# Patient Record
Sex: FEMALE | Race: WHITE | NOT HISPANIC OR LATINO | Employment: OTHER | URBAN - METROPOLITAN AREA
[De-identification: names, ages, dates, MRNs, and addresses within clinical notes are randomized per-mention and may not be internally consistent; named-entity substitution may affect disease eponyms.]

---

## 2017-03-27 ENCOUNTER — TELEPHONE (OUTPATIENT)
Dept: OBSTETRICS AND GYNECOLOGY | Facility: CLINIC | Age: 72
End: 2017-03-27

## 2017-03-27 NOTE — TELEPHONE ENCOUNTER
----- Message from Lizette Carrillo sent at 3/27/2017 11:24 AM CDT -----  Contact: self  Patient is scheduled from a mammogram on 5/4/17  She would like to know if this is the digital  one   Please call  to advise.    Thanks

## 2017-04-25 ENCOUNTER — PATIENT MESSAGE (OUTPATIENT)
Dept: OBSTETRICS AND GYNECOLOGY | Facility: CLINIC | Age: 72
End: 2017-04-25

## 2017-05-04 ENCOUNTER — OFFICE VISIT (OUTPATIENT)
Dept: OBSTETRICS AND GYNECOLOGY | Facility: CLINIC | Age: 72
End: 2017-05-04
Payer: MEDICARE

## 2017-05-04 VITALS
WEIGHT: 138.25 LBS | BODY MASS INDEX: 22.22 KG/M2 | SYSTOLIC BLOOD PRESSURE: 130 MMHG | DIASTOLIC BLOOD PRESSURE: 78 MMHG | HEIGHT: 66 IN

## 2017-05-04 DIAGNOSIS — M85.89 OSTEOPENIA OF MULTIPLE SITES: Primary | ICD-10-CM

## 2017-05-04 PROCEDURE — 99999 PR PBB SHADOW E&M-EST. PATIENT-LVL III: CPT | Mod: PBBFAC,,, | Performed by: OBSTETRICS & GYNECOLOGY

## 2017-05-04 PROCEDURE — G0101 CA SCREEN;PELVIC/BREAST EXAM: HCPCS | Mod: S$GLB,,, | Performed by: OBSTETRICS & GYNECOLOGY

## 2017-05-04 RX ORDER — ESTRADIOL 0.1 MG/G
2 CREAM VAGINAL
Qty: 42.5 G | Refills: 4 | Status: SHIPPED | OUTPATIENT
Start: 2017-05-04 | End: 2017-07-10

## 2017-05-04 RX ORDER — ATORVASTATIN CALCIUM 20 MG/1
TABLET, FILM COATED ORAL
COMMUNITY
Start: 2017-05-02 | End: 2017-07-10

## 2017-05-04 NOTE — MR AVS SNAPSHOT
"    [facility]  [address], [address]  [facility] [address]  Phone: [phone]  Fax: [phone]                  Farrah PUENTE Ludy   2017 10:00 AM   Office Visit    Description:  Female : 1945   Provider:  Kingsley Shah MD   Department:  [facility]           Reason for Visit     Well Woman                To Do List           Future Appointments        Provider Department Dept Phone    2017 11:00 AM [facility] MAM[facility] [place] - Mammography [phone]      Goals (5 Years of Data)     None      [facility]     [facility] On Call Nurse Care Line -  Assistance  Unless otherwise directed by your provider, please contact [facility], our nurse care line that is available for  assistance.     Registered nurses in the [facility] provide: appointment scheduling, clinical advisement, health education, and other advisory services.  Call: [phone] (toll free)               Medications           Message regarding Medications     Verify the changes and/or additions to your medication regime listed below are the same as discussed with your clinician today.  If any of these changes or additions are incorrect, please notify your healthcare provider.             Verify that the below list of medications is an accurate representation of the medications you are currently taking.  If none reported, the list may be blank. If incorrect, please contact your healthcare provider. Carry this list with you in case of emergency.           Current Medications     estradiol (ESTRACE) 0.01 % (0.1 mg/gram) vaginal cream Place 2 g vaginally twice a week.    fluticasone (FLONASE) 50 mcg/actuation nasal spray 1 spray by Each Nare route once daily.    omeprazole (PRILOSEC) 40 MG capsule     atorvastatin (LIPITOR) 20 MG tablet            Clinical Reference Information           Your Vitals Were     BP Height Weight BMI       130/78 5' 6" (1.676 m) 62.7 kg " (138 lb 3.7 oz) 22.31 kg/m2       Blood Pressure          Most Recent Value    BP  130/78      Allergies as of 5/4/2017     Morphine    Codeine      Immunizations Administered on Date of Encounter - 5/4/2017     None      Language Assistance Services     ATTENTION: Language assistance services are available, free of charge. Please call [phone].      ATENCIÓN: Si habla español, tiene a robbins disposición servicios gratuitos de asistencia lingüística. Llame al [phone].     LV Ý: N?u b?n nói Ti?ng Vi?t, có các d?ch v? h? tr? ngôn ng? mi?n phí dành cho b?n. G?i s? [phone].         [facility] complies with applicable Federal civil rights laws and does not discriminate on the basis of race, color, national origin, age, disability, or sex.

## 2017-05-04 NOTE — PROGRESS NOTES
"Chief Complaint   Patient presents with    Well Woman       History and Physical:  No LMP recorded. Patient has had a hysterectomy.       Farrah Boston is a 72 y.o.  female who presents today for her routine annual GYN exam. The patient has no Gynecology complaints today. slgiht breast soreness, negative cardiac work up, not sexually active, counseled - will decrease estrace to once a week.       Allergies:   Review of patient's allergies indicates:   Allergen Reactions    Morphine Shortness Of Breath     Patient states her" breathing stops"    Codeine Nausea And Vomiting       Past Medical History:   Diagnosis Date    Osteopenia     Reflux esophagitis     Seasonal allergies        Past Surgical History:   Procedure Laterality Date    ABDOMINAL ADHESION SURGERY      ANTERIOR VAGINAL REPAIR      BREAST BIOPSY      benign    CHOLECYSTECTOMY      HYSTERECTOMY         MEDS:   Current Outpatient Prescriptions on File Prior to Visit   Medication Sig Dispense Refill    estradiol (ESTRACE) 0.01 % (0.1 mg/gram) vaginal cream Place 2 g vaginally twice a week. 42.5 g 4    fluticasone (FLONASE) 50 mcg/actuation nasal spray 1 spray by Each Nare route once daily.      omeprazole (PRILOSEC) 40 MG capsule        No current facility-administered medications on file prior to visit.        OB History      Para Term  AB TAB SAB Ectopic Multiple Living    4 3   1  1             Social History     Social History    Marital status:      Spouse name: N/A    Number of children: N/A    Years of education: N/A     Occupational History    Not on file.     Social History Main Topics    Smoking status: Never Smoker    Smokeless tobacco: Not on file    Alcohol use Yes      Comment: rarely    Drug use: No    Sexual activity: Yes     Partners: Male     Birth control/ protection: None     Other Topics Concern    Not on file     Social History Narrative       Family History   Problem Relation Age " "of Onset    Breast cancer Maternal Aunt     Breast cancer Maternal Aunt     Ovarian cancer Neg Hx          Past medical and surgical history reviewed.   I have reviewed the patient's medical history in detail and updated the computerized patient record.        Review of System:   General: no chills, fever, night sweats, weight gain or weight loss  Psychological: no depression or suicidal ideation  Breasts: no new or changing breast lumps, nipple discharge or masses.  Respiratory: no cough, shortness of breath, or wheezing  Cardiovascular: no chest pain or dyspnea on exertion  Gastrointestinal: no abdominal pain, change in bowel habits, or black or bloody stools  Genito-Urinary: no incontinence, urinary frequency/urgency or vulvar/vaginal symptoms, pelvic pain or abnormal vaginal bleeding.  Musculoskeletal: no gait disturbance or muscular weakness      Physical Exam:   /78  Ht 5' 6" (1.676 m)  Wt 62.7 kg (138 lb 3.7 oz)  BMI 22.31 kg/m2  Constitutional: She is oriented to person, place, and time. She appears well-developed and well-nourished. No distress.   HENT:   Head: Normocephalic and atraumatic.   Eyes: Conjunctivae and EOM are normal. No scleral icterus.   Neck: Normal range of motion. Neck supple. No tracheal deviation present.   Cardiovascular: Normal rate.    Pulmonary/Chest: Effort normal. No respiratory distress. She exhibits no tenderness.  Breasts: are symmetrical.   Right breast exhibits no inverted nipple, no mass, no nipple discharge, no skin change and no tenderness.   Left breast exhibits no inverted nipple, no mass, no nipple discharge, no skin change and no tenderness.  Abdominal: Soft. She exhibits no distension and no mass. There is no tenderness. There is no rebound and no guarding.   Genitourinary:    External rectal exam shows no thrombosed external hemorrhoids.    Pelvic exam was performed with patient supine.   No labial fusion.   There is no rash, lesion or injury on the right " labia.   There is no rash, lesion or injury on the left labia.   No bleeding and no signs of injury around the vaginal introitus, urethra is without lesions and well supported.    No vaginal discharge found. Pale / atrophic   No significant Cystocele, Enterocele or rectocele, and cuff well supported.   Bimanual exam:   The urethra and vagina are without palpable masses or tenderness.   Uterus and cervix are surgically absents, vaginal cuff is intact and well supported.   Right adnexum displays no mass and no tenderness.   Left adnexum displays no mass and no tenderness.  Musculoskeletal: Normal range of motion.   Lymphadenopathy: No inguinal adenopathy present.   Neurological: She is alert and oriented to person, place, and time. Coordination normal.   Skin: Skin is warm and dry. She is not diaphoretic.   Psychiatric: She has a normal mood and affect.        Assessment:   Normal annual GYN exam  1. Osteopenia of multiple sites  DXA Bone Density Spine And Hip   vag atrophy, breast soreness    Plan:   PAP not needed  Mammogram, dexa  Decrease estrace to once a week  Follow up in 2 years.

## 2017-05-16 ENCOUNTER — HOSPITAL ENCOUNTER (OUTPATIENT)
Dept: RADIOLOGY | Facility: HOSPITAL | Age: 72
Discharge: HOME | End: 2017-05-16
Attending: OBSTETRICS & GYNECOLOGY
Payer: MEDICARE

## 2017-05-16 ENCOUNTER — TELEPHONE (OUTPATIENT)
Dept: OBSTETRICS AND GYNECOLOGY | Facility: CLINIC | Age: 72
End: 2017-05-16

## 2017-05-16 ENCOUNTER — PATIENT MESSAGE (OUTPATIENT)
Dept: OBSTETRICS AND GYNECOLOGY | Facility: CLINIC | Age: 72
End: 2017-05-16

## 2017-05-16 DIAGNOSIS — Z12.31 SCREENING MAMMOGRAM FOR HIGH-RISK PATIENT: ICD-10-CM

## 2017-05-16 DIAGNOSIS — M85.89 OSTEOPENIA OF MULTIPLE SITES: ICD-10-CM

## 2017-05-16 DIAGNOSIS — N95.2 VAGINAL ATROPHY: ICD-10-CM

## 2017-05-16 PROCEDURE — 77063 BREAST TOMOSYNTHESIS BI: CPT | Mod: 26,,, | Performed by: RADIOLOGY

## 2017-05-16 PROCEDURE — 77080 DXA BONE DENSITY AXIAL: CPT | Mod: TC

## 2017-05-16 PROCEDURE — 77067 SCR MAMMO BI INCL CAD: CPT | Mod: 26,,, | Performed by: RADIOLOGY

## 2017-05-16 PROCEDURE — 77067 SCR MAMMO BI INCL CAD: CPT | Mod: TC

## 2017-05-16 PROCEDURE — 77080 DXA BONE DENSITY AXIAL: CPT | Mod: 26,,, | Performed by: RADIOLOGY

## 2017-05-16 NOTE — TELEPHONE ENCOUNTER
Patient notified of DEXA scan results and stated she can't take Fosamax it caused horrible GI problems.

## 2017-05-21 ENCOUNTER — NURSE TRIAGE (OUTPATIENT)
Dept: ADMINISTRATIVE | Facility: CLINIC | Age: 72
End: 2017-05-21

## 2017-05-21 NOTE — TELEPHONE ENCOUNTER
"  Reason for Disposition   Question about upcoming scheduled test, no triage required and triager able to answer question    Answer Assessment - Initial Assessment Questions  1. REASON FOR CALL or QUESTION: "What is your reason for calling today?" or "How can I best help you?" or "What question do you have that I can help answer?"      Patient is nauseated while taking bowel prep. Procedure is not scheduled through [facility].  Patient encouraged to take the prep slowly and call that facility in the am.    Protocols used: ST INFORMATION ONLY CALL-A-AH    "

## 2017-05-23 NOTE — TELEPHONE ENCOUNTER
Patient would much rather try some thing other infusion. She wants to know if this is truly necessary and if she can just do calcium and exercise?

## 2017-07-11 ENCOUNTER — OFFICE VISIT (OUTPATIENT)
Dept: FAMILY MEDICINE | Facility: CLINIC | Age: 72
End: 2017-07-11
Payer: MEDICARE

## 2017-07-11 VITALS
HEART RATE: 85 BPM | HEIGHT: 66 IN | TEMPERATURE: 98 F | BODY MASS INDEX: 22.18 KG/M2 | DIASTOLIC BLOOD PRESSURE: 77 MMHG | SYSTOLIC BLOOD PRESSURE: 145 MMHG | WEIGHT: 138 LBS

## 2017-07-11 DIAGNOSIS — N30.00 ACUTE CYSTITIS WITHOUT HEMATURIA: Primary | ICD-10-CM

## 2017-07-11 PROBLEM — M51.379 DEGENERATION OF INTERVERTEBRAL DISC OF LUMBOSACRAL REGION: Status: ACTIVE | Noted: 2017-07-11

## 2017-07-11 PROBLEM — N95.1 MENOPAUSAL SYNDROME: Status: ACTIVE | Noted: 2017-07-11

## 2017-07-11 PROBLEM — K21.9 GASTROESOPHAGEAL REFLUX DISEASE WITHOUT ESOPHAGITIS: Status: ACTIVE | Noted: 2017-07-11

## 2017-07-11 PROBLEM — J30.2 SEASONAL ALLERGIC RHINITIS: Status: ACTIVE | Noted: 2017-07-11

## 2017-07-11 PROBLEM — M51.37 DEGENERATION OF INTERVERTEBRAL DISC OF LUMBOSACRAL REGION: Status: ACTIVE | Noted: 2017-07-11

## 2017-07-11 PROBLEM — Z78.9 NON-SMOKER: Status: ACTIVE | Noted: 2017-07-11

## 2017-07-11 PROCEDURE — 99212 OFFICE O/P EST SF 10 MIN: CPT | Mod: ,,, | Performed by: INTERNAL MEDICINE

## 2017-07-11 PROCEDURE — 1159F MED LIST DOCD IN RCRD: CPT | Mod: ,,, | Performed by: INTERNAL MEDICINE

## 2017-07-11 PROCEDURE — 1125F AMNT PAIN NOTED PAIN PRSNT: CPT | Mod: ,,, | Performed by: INTERNAL MEDICINE

## 2017-07-11 RX ORDER — CIPROFLOXACIN 250 MG/1
250 TABLET, FILM COATED ORAL 2 TIMES DAILY
Qty: 6 TABLET | Refills: 0 | Status: SHIPPED | OUTPATIENT
Start: 2017-07-11 | End: 2017-08-23 | Stop reason: ALTCHOICE

## 2017-07-11 RX ORDER — ATORVASTATIN CALCIUM 10 MG/1
10 TABLET, FILM COATED ORAL DAILY
COMMUNITY
End: 2020-09-14

## 2017-07-11 NOTE — PROGRESS NOTES
Subjective:       Patient ID: Farrah Boston is a 72 y.o. female.    Chief Complaint: Urinary Tract Infection (x4days)    Urinary Tract Infection    This is a new problem. The current episode started in the past 7 days. The problem occurs intermittently. The quality of the pain is described as burning. Pertinent negatives include no chills, flank pain, frequency, constipation or rash.       Past Medical History:   Diagnosis Date    Anxiety     Depression     GERD (gastroesophageal reflux disease)     Osteopenia     Reflux esophagitis     Seasonal allergies      Social History     Social History    Marital status:      Spouse name: N/A    Number of children: N/A    Years of education: N/A     Occupational History    Not on file.     Social History Main Topics    Smoking status: Never Smoker    Smokeless tobacco: Never Used    Alcohol use Yes      Comment: rarely    Drug use: No    Sexual activity: Yes     Partners: Male     Birth control/ protection: None     Other Topics Concern    Not on file     Social History Narrative    No narrative on file     Past Surgical History:   Procedure Laterality Date    ABDOMINAL ADHESION SURGERY      ANTERIOR VAGINAL REPAIR      BREAST BIOPSY      benign    CHOLECYSTECTOMY      HERNIA REPAIR      HYSTERECTOMY       Family History   Problem Relation Age of Onset    Breast cancer Maternal Aunt     Breast cancer Maternal Aunt     Hypertension Mother     Cirrhosis Father     Ovarian cancer Neg Hx        Review of Systems   Constitutional: Negative for activity change, chills, fatigue, fever and unexpected weight change.   HENT: Negative for congestion, postnasal drip and sinus pressure.    Eyes: Negative for pain, discharge and visual disturbance.   Respiratory: Negative for cough, chest tightness and shortness of breath.    Cardiovascular: Negative for chest pain, palpitations and leg swelling.   Gastrointestinal: Negative for abdominal distention,  "anal bleeding, constipation and diarrhea.   Genitourinary: Negative for difficulty urinating, dysuria, flank pain, frequency, menstrual problem, pelvic pain, vaginal bleeding and vaginal pain.   Skin: Negative for color change, pallor and rash.   Allergic/Immunologic: Negative for environmental allergies, food allergies and immunocompromised state.   Neurological: Negative for dizziness, tremors, seizures, syncope, light-headedness and headaches.   Psychiatric/Behavioral: The patient is not nervous/anxious.        Objective:      Vitals:    07/11/17 1458   BP: (!) 145/77   Pulse: 85   Temp: 97.8 °F (36.6 °C)   Weight: 62.6 kg (138 lb)   Height: 5' 6" (1.676 m)     Physical Exam   Constitutional: She appears well-developed and well-nourished.   HENT:   Head: Normocephalic and atraumatic.   Neck: Normal range of motion. No tracheal deviation present.   Cardiovascular: Normal rate and regular rhythm.    Pulmonary/Chest: Effort normal and breath sounds normal.   Abdominal: She exhibits no distension. There is no tenderness. There is no guarding.   Skin: Skin is warm.       Assessment:       1. Acute cystitis without hematuria         Plan:           Acute cystitis without hematuria  -     Urinalysis; Future    Other orders  -     ciprofloxacin HCl (CIPRO) 250 MG tablet; Take 1 tablet (250 mg total) by mouth 2 (two) times daily.  Dispense: 6 tablet; Refill: 0    Patient has elevated blood pressures today. I've advised her to monitor her blood pressures at home and follow-up in one month.  "

## 2017-07-11 NOTE — PATIENT INSTRUCTIONS
Understanding Urinary Tract Infections (UTIs)  Most UTIs are caused by bacteria, although they may also be caused by viruses or fungi. Bacteria from the bowel are the most common source of infection. The infection may begin because of any of the following:  · Sexual activity. During sex, germs can travel from the penis, vagina, or rectum into the urethra.   · Germs on the skin outside the rectum may travel into the urethra. This is more common in women since the rectum and urethra are closer to each other than in men. Wiping from front to back after using the toilet and keeping the area clean can help prevent germs from getting to the urethra.  · Blockage of urine flow through the urinary tract. If urine sits too long, germs may begin to grow out of control.      Parts of the urinary tract  The infection can occur in any part of the urinary tract.  · The kidneys collect and store urine.  · The ureters carry urine from the kidneys to the bladder.  · The bladder holds urine until you are ready to let it out.  · The urethra carries urine from the bladder out of the body. It is shorter in women, so bacteria can move through it more easily. The urethra is longer in men, so a UTI is less likely to reach the bladder or kidneys in men.  Date Last Reviewed: 9/8/2014  © 5983-7082 [employer]. [address] [place] [address], [place] [address]. All rights reserved. This information is not intended as a substitute for professional medical care. Always follow your healthcare professional's instructions.

## 2017-08-23 ENCOUNTER — OFFICE VISIT (OUTPATIENT)
Dept: FAMILY MEDICINE | Facility: CLINIC | Age: 72
End: 2017-08-23
Payer: MEDICARE

## 2017-08-23 VITALS
DIASTOLIC BLOOD PRESSURE: 72 MMHG | HEART RATE: 84 BPM | HEIGHT: 66 IN | SYSTOLIC BLOOD PRESSURE: 105 MMHG | BODY MASS INDEX: 21.69 KG/M2 | WEIGHT: 135 LBS

## 2017-08-23 DIAGNOSIS — J30.1 CHRONIC SEASONAL ALLERGIC RHINITIS DUE TO POLLEN: ICD-10-CM

## 2017-08-23 DIAGNOSIS — K21.9 GASTROESOPHAGEAL REFLUX DISEASE WITHOUT ESOPHAGITIS: ICD-10-CM

## 2017-08-23 DIAGNOSIS — Z11.59 NEED FOR HEPATITIS C SCREENING TEST: ICD-10-CM

## 2017-08-23 DIAGNOSIS — E78.2 MIXED DYSLIPIDEMIA: Primary | ICD-10-CM

## 2017-08-23 DIAGNOSIS — M51.37 DEGENERATION OF INTERVERTEBRAL DISC OF LUMBOSACRAL REGION: ICD-10-CM

## 2017-08-23 PROCEDURE — 1125F AMNT PAIN NOTED PAIN PRSNT: CPT | Mod: ,,, | Performed by: INTERNAL MEDICINE

## 2017-08-23 PROCEDURE — 3008F BODY MASS INDEX DOCD: CPT | Mod: ,,, | Performed by: INTERNAL MEDICINE

## 2017-08-23 PROCEDURE — 1159F MED LIST DOCD IN RCRD: CPT | Mod: ,,, | Performed by: INTERNAL MEDICINE

## 2017-08-23 PROCEDURE — 99214 OFFICE O/P EST MOD 30 MIN: CPT | Mod: ,,, | Performed by: INTERNAL MEDICINE

## 2017-08-23 NOTE — PROGRESS NOTES
Subjective:       Patient ID: Farrah Boston is a 72 y.o. female.    Chief Complaint: Hypertension and Gastroesophageal Reflux    Ms. farrah Boston is a 72-year-old  female who comes for follow-up. She has underlying dyslipidemia, gastroesophageal reflux and allergic rhinitis.    She had seen a cardiologist recently and was told to stop PPIs because of long-term implications with memory loss, cardiac events etc.    She did stop the PPIs and try to depend upon H2-blockers but after 2 weeks of stopping the PPIs, she started experiencing moderately significant heartburn and reflux-like symptoms. She is concerned about that.    She had an endoscopy several years back by Dr. Trammell which was apparently unremarkable.    The endoscopy finding at that time did not reveal any evidence of expiratory or Caballero's esophagitis. A few gastric polyps were found which were ultimately benign.      Hypertension   This is a chronic problem. The current episode started more than 1 year ago. The problem is controlled. Associated symptoms include anxiety. Pertinent negatives include no chest pain, headaches, palpitations or shortness of breath. There are no associated agents to hypertension. Risk factors for coronary artery disease include sedentary lifestyle. Past treatments include nothing. There is no history of coarctation of the aorta or hyperaldosteronism.   Gastroesophageal Reflux   She complains of abdominal pain and heartburn. She reports no chest pain, no coughing, no early satiety or no sore throat. This is a recurrent problem. The problem occurs frequently. The problem has been gradually worsening. Pertinent negatives include no fatigue or weight loss. She has tried a PPI and a histamine-2 antagonist for the symptoms. The treatment provided moderate relief. Past procedures include an EGD (2012 Dr Trammell- unremarkable).   Back Pain   This is a new problem. The current episode started 1 to 4 weeks ago. The problem  occurs every several days. The problem has been waxing and waning since onset. The pain is present in the sacro-iliac and lumbar spine. The pain does not radiate. The pain is at a severity of 4/10. The pain is the same all the time. The symptoms are aggravated by bending. Associated symptoms include abdominal pain. Pertinent negatives include no chest pain, dysuria, fever, headaches, numbness, pelvic pain, perianal numbness, tingling, weakness or weight loss. Risk factors include sedentary lifestyle.       Past Medical History:   Diagnosis Date    Anxiety     Depression     GERD (gastroesophageal reflux disease)     Osteopenia     Reflux esophagitis     Seasonal allergies      Social History     Social History    Marital status:      Spouse name: N/A    Number of children: N/A    Years of education: N/A     Occupational History    Not on file.     Social History Main Topics    Smoking status: Never Smoker    Smokeless tobacco: Never Used    Alcohol use Yes      Comment: rarely    Drug use: No    Sexual activity: Yes     Partners: Male     Birth control/ protection: None     Other Topics Concern    Not on file     Social History Narrative    No narrative on file     Past Surgical History:   Procedure Laterality Date    ABDOMINAL ADHESION SURGERY      ANTERIOR VAGINAL REPAIR      BREAST BIOPSY      benign    CHOLECYSTECTOMY      HERNIA REPAIR      HYSTERECTOMY       Family History   Problem Relation Age of Onset    Breast cancer Maternal Aunt     Breast cancer Maternal Aunt     Hypertension Mother     Cirrhosis Father     Ovarian cancer Neg Hx        Review of Systems   Constitutional: Negative for activity change, chills, fatigue, fever, unexpected weight change and weight loss.   HENT: Negative for congestion, postnasal drip, sinus pressure and sore throat.    Eyes: Negative for pain, discharge and visual disturbance.   Respiratory: Negative for cough, chest tightness and shortness  "of breath.    Cardiovascular: Negative for chest pain, palpitations and leg swelling.   Gastrointestinal: Positive for abdominal pain and heartburn. Negative for abdominal distention, anal bleeding, constipation and diarrhea.        Patient complains of heart burns and significant reflux-like symptoms.   Endocrine:        Patient has dyslipidemia.   Genitourinary: Negative for difficulty urinating, dysuria and pelvic pain.   Musculoskeletal: Positive for back pain. Negative for arthralgias and joint swelling.   Skin: Negative for color change, pallor and rash.   Allergic/Immunologic: Negative for environmental allergies, food allergies and immunocompromised state.   Neurological: Negative for dizziness, tingling, tremors, seizures, syncope, weakness, light-headedness, numbness and headaches.   Hematological: Negative for adenopathy. Does not bruise/bleed easily.   Psychiatric/Behavioral: Negative for agitation, confusion and dysphoric mood. The patient is nervous/anxious.        Objective:       Vitals:    08/23/17 1052 08/23/17 1054   BP: 127/66 105/72   Pulse: 84    Weight: 61.2 kg (135 lb)    Height: 5' 6" (1.676 m)      Physical Exam   Constitutional: She is oriented to person, place, and time. She appears well-developed and well-nourished. She is cooperative. No distress.   HENT:   Head: Normocephalic and atraumatic.   Right Ear: Tympanic membrane normal.   Left Ear: Tympanic membrane normal.   Eyes: Conjunctivae, EOM and lids are normal. Lids are everted and swept, no foreign bodies found. Right pupil is round and reactive. Left pupil is round and reactive.   Neck: Trachea normal and normal range of motion. Neck supple.   Cardiovascular: Normal rate, regular rhythm, S1 normal, S2 normal, normal heart sounds and intact distal pulses.    Pulmonary/Chest: Breath sounds normal.   Abdominal: Soft. Bowel sounds are normal. There is no rigidity and no guarding.   Musculoskeletal: Normal range of motion.        " Back:    Lymphadenopathy:     She has no cervical adenopathy.     She has no axillary adenopathy.   Neurological: She is alert and oriented to person, place, and time.   Skin: Skin is warm and dry.   Psychiatric: Her mood appears anxious.   Nursing note and vitals reviewed.      Assessment:       1. Mixed dyslipidemia    2. Chronic seasonal allergic rhinitis due to pollen    3. Gastroesophageal reflux disease without esophagitis    4. Degeneration of intervertebral disc of lumbosacral region    5. Need for hepatitis C screening test         Plan:           Mixed dyslipidemia    Chronic seasonal allergic rhinitis due to pollen    Gastroesophageal reflux disease without esophagitis    Degeneration of intervertebral disc of lumbosacral region    Need for hepatitis C screening test  -     Hepatitis C antibody; Future; Expected date: 08/23/2017    Patient had stopped omeprazole temporarily at the suggestion of her cardiologist. She started having severe heartburns and reflux-like symptoms in spite of taking ranitidine. She had to go back on omeprazole.    I've advised her to continue to watch her diet and avoid caffeinated beverages, carbonated beverages, spicy and greasy food.    She can take over-the-counter ranitidine or Pepcid on a regular basis for heartburns.    On a special occasion or when she notices that the heartburn is getting better, she should take Nexium or omeprazole. Please note that most of these medications are now over-the-counter. Over-the-counter medication Zegerid (combination of soda bicarbonate and PPI) also provides immediate relief and a sustained continued relief.    Of course she needs to continue to watch her diet, drink extra water.  She can also try over-the-counter aloe vera supplement.    Patient also has low back pain secondary to DJD without any radiation and I recommended her some physical therapy

## 2017-08-23 NOTE — PATIENT INSTRUCTIONS
Medicines for Acid Reflux  Your healthcare provider has told you that you have acid reflux. This condition causes stomach acid to wash up into your throat. For most people, acid reflux is troubling but not dangerous. But left untreated, acid reflux sometimes damages the esophagus. Medicines can help control acid reflux and limit your risk of future problems.  Medicines for acid reflux  Your healthcare provider may prescribe medicine to help treat your acid reflux. Medicine will be based on your symptoms and any test results. Your provider will explain how to take your medicine. You will also be told about possible side effects.  Reducing stomach acid  Your provider may suggest antacids that you can buy over the counter. Antacids can give fast relief. Or you may be told to take a type of medicine called H2 blockers. These are available over the counter and by prescription (for higher doses).  Blocking stomach acid  In more severe cases, your healthcare provider may suggest stronger medicines such as proton pump inhibitors (PPIs). These keep the stomach from making acid. They are often prescribed for long-term use.  Other medicines  In some cases medicines to reduce or block stomach acid may not work. Then you may be switched to another type of medicine that helps your stomach empty better.     Date Last Reviewed: 10/1/2016  © 9628-1773 [employer]. [address] [place] [address], [place] [address]. All rights reserved. This information is not intended as a substitute for professional medical care. Always follow your healthcare professional's instructions.        Tips to Control Acid Reflux    To control acid reflux, youll need to make some basic diet and lifestyle changes. The simple steps outlined below may be all youll need to ease discomfort.  Watch what you eat  · Avoid fatty foods and spicy foods.  · Eat fewer acidic foods, such as citrus and tomato-based foods. These can increase symptoms.  · Limit  drinking alcohol, caffeine, and fizzy beverages. All increase acid reflux.  · Try limiting chocolate, peppermint, and spearmint. These can worsen acid reflux in some people.  Watch when you eat  · Avoid lying down for 3 hours after eating.  · Do not snack before going to bed.  Raise your head  Raising your head and upper body by 4 to 6 inches helps limit reflux when youre lying down. Put blocks under the head of your bed frame to raise it.  Other changes  · Lose weight, if you need to  · Dont exercise near bedtime  · Avoid tight-fitting clothes  · Limit aspirin and ibuprofen  · Stop smoking   Date Last Reviewed: 7/1/2016  © 1755-6096 [facility]. [address] [place] [address], [place] [address]. All rights reserved. This information is not intended as a substitute for professional medical care. Always follow your healthcare professional's instructions.

## 2017-09-05 ENCOUNTER — PATIENT MESSAGE (OUTPATIENT)
Dept: FAMILY MEDICINE | Facility: CLINIC | Age: 72
End: 2017-09-05

## 2017-09-05 ENCOUNTER — TELEPHONE (OUTPATIENT)
Dept: FAMILY MEDICINE | Facility: CLINIC | Age: 72
End: 2017-09-05

## 2017-09-05 NOTE — TELEPHONE ENCOUNTER
Pt said u took her off omeprazole and said try ranadine otc   Its not working  What else can you give  In rx form   ?????

## 2017-09-05 NOTE — TELEPHONE ENCOUNTER
Patient called stating that she has severe heartburn on Zantac. She was okay on omeprazole.  She tends to be somewhat irregular on Zantac  And misses a dose. She was okay after stopping omeprazole for a couple of days. After that she has  Moderately severe heartburns. She could not sleep last night.    She does not drink  Coffee or tea. She does eat some chocolate mint    I have advised patient to go back on omeprazole and try alternating with Zantac and see how she does. Keep regular followup.    Patient expresses concern about Long done side effects from omeprazole

## 2017-10-02 ENCOUNTER — OFFICE VISIT (OUTPATIENT)
Dept: FAMILY MEDICINE | Facility: CLINIC | Age: 72
End: 2017-10-02
Payer: MEDICARE

## 2017-10-02 VITALS
DIASTOLIC BLOOD PRESSURE: 80 MMHG | WEIGHT: 135 LBS | BODY MASS INDEX: 21.69 KG/M2 | HEART RATE: 88 BPM | SYSTOLIC BLOOD PRESSURE: 117 MMHG | HEIGHT: 66 IN

## 2017-10-02 DIAGNOSIS — R13.19 ESOPHAGEAL DYSPHAGIA: ICD-10-CM

## 2017-10-02 DIAGNOSIS — Z23 INFLUENZA VACCINE ADMINISTERED: Primary | ICD-10-CM

## 2017-10-02 PROCEDURE — 90662 IIV NO PRSV INCREASED AG IM: CPT | Mod: ,,, | Performed by: INTERNAL MEDICINE

## 2017-10-02 PROCEDURE — G0008 ADMIN INFLUENZA VIRUS VAC: HCPCS | Mod: ,,, | Performed by: INTERNAL MEDICINE

## 2017-10-02 PROCEDURE — 99214 OFFICE O/P EST MOD 30 MIN: CPT | Mod: ,,, | Performed by: INTERNAL MEDICINE

## 2017-10-02 NOTE — PROGRESS NOTES
Subjective:       Patient ID: Farrah Boston is a 72 y.o. female.    Chief Complaint: Gastroesophageal Reflux and Sore Throat (painful to swallow )    Ms. Farrah Boston is a 72-year-old  female who comes for complains of retrosternal chest tightness and fullness whenever she eats solids. At the same time she experiences midback discomfort this seems to be radiating from the chest. There is leak with some fluid see seems to be doing okay. She has a lifelong history of gastroesophageal reflux and had advised her to start cutting down on PPI. She experiences increased heartburn and reflux-like symptoms after cutting down the PPI. She had resumed the PPI again with alternating ranitidine which helped her reflux except that these new symptoms started.    She has lost a couple of pounds of weight. There is no blood in the stool. No fever. The chest pain is not at all related to exertion and no accompanying palpitations. The chest discomfort seems to be related to eating solid foods.    She does not recall when she had the last endoscopy or colonoscopy. She does not recall if she had one at all.( review of old records indicate that she might have a colonoscopy in 2012)        Past Medical History:   Diagnosis Date    Anxiety     Depression     GERD (gastroesophageal reflux disease)     Osteopenia     Reflux esophagitis     Seasonal allergies      Social History     Social History    Marital status:      Spouse name: N/A    Number of children: N/A    Years of education: N/A     Occupational History    Not on file.     Social History Main Topics    Smoking status: Never Smoker    Smokeless tobacco: Never Used    Alcohol use Yes      Comment: rarely    Drug use: No    Sexual activity: Yes     Partners: Male     Birth control/ protection: None     Other Topics Concern    Not on file     Social History Narrative    No narrative on file     Past Surgical History:   Procedure Laterality Date     ABDOMINAL ADHESION SURGERY      ANTERIOR VAGINAL REPAIR      BREAST BIOPSY      benign    CHOLECYSTECTOMY      HERNIA REPAIR      HYSTERECTOMY       Family History   Problem Relation Age of Onset    Breast cancer Maternal Aunt     Breast cancer Maternal Aunt     Hypertension Mother     Cirrhosis Father     Ovarian cancer Neg Hx        Review of Systems   Constitutional: Negative for activity change, chills, fatigue, fever and unexpected weight change (2 lbs because of dysphagia).   HENT: Negative for congestion, postnasal drip, sinus pressure and sore throat.    Eyes: Negative for pain, discharge and visual disturbance.   Respiratory: Negative for cough, chest tightness and shortness of breath.    Cardiovascular: Positive for chest pain. Negative for palpitations and leg swelling.   Gastrointestinal: Negative for abdominal distention, abdominal pain, anal bleeding, constipation and diarrhea.        Patient's reflux symptoms seem to be getting better. Patient complains of new onset of dysphagia symptoms while swallowing and she experiences a upper chest discomfort and also a back discomfort and upper spine.   Endocrine:        Patient has dyslipidemia.   Genitourinary: Negative for difficulty urinating, dysuria and pelvic pain.   Musculoskeletal: Positive for back pain (radiating from the chest anteriorly.). Negative for arthralgias and joint swelling.   Skin: Negative for color change, pallor and rash.   Allergic/Immunologic: Negative for environmental allergies, food allergies and immunocompromised state.   Neurological: Negative for dizziness, tremors, seizures, syncope, weakness, light-headedness, numbness and headaches.   Hematological: Negative for adenopathy. Does not bruise/bleed easily.   Psychiatric/Behavioral: Negative for agitation, confusion and dysphoric mood. The patient is nervous/anxious.        Objective:       Vitals:    10/02/17 1003   BP: 117/80   Pulse: 88   Weight: 61.2 kg (135 lb)  "  Height: 5' 6" (1.676 m)     Physical Exam   Constitutional: She is oriented to person, place, and time. She appears well-developed and well-nourished. She is cooperative. No distress.   HENT:   Head: Normocephalic and atraumatic.   Eyes: Conjunctivae, EOM and lids are normal. Lids are everted and swept, no foreign bodies found. Right pupil is round and reactive. Left pupil is round and reactive.   Neck: Trachea normal and normal range of motion. Neck supple.   Cardiovascular: Normal rate, regular rhythm, S1 normal, S2 normal and normal heart sounds.    Pulmonary/Chest: Breath sounds normal.   Abdominal: Soft. Bowel sounds are normal. There is no rigidity and no guarding.   Musculoskeletal: Normal range of motion.   Lymphadenopathy:     She has no cervical adenopathy.     She has no axillary adenopathy.   Neurological: She is alert and oriented to person, place, and time.   Skin: Skin is warm and dry.   Psychiatric: Her mood appears anxious.   Nursing note and vitals reviewed.      Assessment:       1. Influenza vaccine administered    2. Esophageal dysphagia         Plan:           Influenza vaccine administered  -     Influenza - High Dose (65+) (PF) (IM)    Esophageal dysphagia  -     FL Esophagram Complete; Future; Expected date: 10/02/2017    Other orders  -     Cancel: Influenza - High Dose (65+) (PF) (IM)    Patient seems to have a new onset of chest discomfort related to swallowing solids. He has a long-standing history of reflux. I will get a esophagram and see if this any stenosis or stricture. She may need an endoscopy. I explained to the patient. Follow-up after endoscopy.  "

## 2017-10-02 NOTE — PATIENT INSTRUCTIONS
Modified Barium Swallow  A modified barium swallow (also called a video fluoroscopic swallowing exam) is a test that checks your ability to swallow different consistencies of fluids and solid materials. It also helps in planning treatment, if needed. During the test, a substance called barium is mixed with a variety of materials that you will swallow. The material mixed with barium lets the healthcare providers see the parts involved with swallowing (the tongue, mouth, throat, and esophagus) clearly on X-rays. The test is needed if you have problems swallowing and are at risk of choking or food or liquid going into the lungs (aspiration). It is also needed if you have a feeding tube and your healthcare provider wants to check whether you can go back to eating by mouth.  What happens before the test?  · Let your healthcare provider know of any medicines youre taking. This includes vitamins, herbs, and over-the-counter medicines. You may be told to stop certain medicines in the days before the test.  · Stop eating and drinking 4 hours before the test.  · Follow any other instructions given by your healthcare provider.  Let the technologist know  For your safety, let the technologist know if you:  · Are taking any medicines  · Had recent X-rays or tests involving barium  · Had recent surgery  · Have other health problems, especially those affecting the lungs.  · Have any allergies  · Are pregnant or may be pregnant   What happens during the test?  The test takes place in an X-ray department. Its done by a speech therapist or feeding specialist. These people are trained to help you with swallowing or feeding problems. A radiology technologist is also present. A radiologist will be present as well. A radiologist is a doctor who specializes in diagnosis of disease and injury by interpreting medical imaging such as X-rays.  · Youll likely be seated in a special chair that is next to an X-ray table. The X-ray table is  set upright.  · Youre given tiny amounts of foods and liquids of different textures to swallow. This may include thin liquids (such as water) or thick liquids (such as milk). You may also be given soft foods (such as pudding). The foods and liquids contain a small amount of barium.  · The speech pathologist or feeding specialist watches your swallowing. Youre observed carefully for signs of problems as each food or liquid is swallowed. If problems do occur as youre swallowing, steps are quickly taken to treat these problems.   · An X-ray video is also taken as each food or liquid is swallowed. The barium in the foods and liquids shows up clearly on the video.  What happens after the test?  · You may become constipated after the test. This is due to the barium. If you can drink liquids, drinking water may help to prevent this constipation.  · Your stool will appear chalky white or light for 1 to 2 days. This is a sign of the barium passing from your system.  When to call your healthcare provider  Call your healthcare provider if:  · You have severe constipation.  · You do not have signs of the barium passing (white or light stools) within 24 hours.  Follow-up care  The X-ray video and notes from the test are studied by the healthcare providers that were present for your test. These results are then discussed with your main healthcare provider. Your provider will meet with you to go over the results. Youll be advised about what foods or liquids are safe for you. In severe cases of swallowing disorder, it wont be possible for you to eat or drink safely. This means youll need a feeding tube if you dont already have one. Your healthcare provider can tell you more about this, if needed.  What are the risks and complications?  Risks and possible complications of a modified barium swallow include:  · Liquid going into the lungs (aspiration)  · Radiation exposure from X-rays  · Blockage of the bowel due to retained  barium  · Allergic reaction to the barium   Date Last Reviewed: 2/1/2017  © 3099-3984 [employer] [address] [place] [address], [place] [address]. All rights reserved. This information is not intended as a substitute for professional medical care. Always follow your healthcare professional's instructions.        Treating Dysphagia     Talk to your healthcare provider before you take any medicines.    A medical evaluation helps your healthcare provider find the cause of your trouble swallowing, or dysphagia. Your evaluation may include a medical history and some special tests. Your provider will make a treatment plan based on the results of your evaluation. You may need to take medicines. In some cases, your provider may suggest a procedure to stretch or widen your esophagus (esophageal dilation). Or your provider may suggest surgery.  What you can do  To help control dysphagia, follow your treatment plan. Take all medicines as directed. You also can help lessen your dysphagia symptoms by being careful about what and how you eat.  Medicines  You may need medicines, such as those that:  · Reduce or neutralize stomach acids  · Control esophagus muscle spasms  · Treat an allergic disorder of the esophagus that is causing the problem  · Are injected into the esophagus to help symptoms  Esophageal dilation  Dilation is a procedure that your provider can use to widen your esophagus. It is most often done when a narrowing (stricture) of the esophagus is causing the problem. There are many ways your provider can widen your esophagus. He or she can discuss them with you.  Eating tips  · Eat slowly in a relaxed setting.  · Dont talk while you eat.  · Take small bites and chew slowly and thoroughly  · Sit in an upright position during and after meals.  · Ask your provider about any special diets that may help, such as liquid diets.  · If you have trouble swallowing solid foods, you can use a  to mash or purée  them.  · Thicken liquids with milk, juice, broth, gravy, or starch to make swallowing easier.  Occupational or speech therapy  Your healthcare provider may recommend that you have an evaluation or sessions with a speech or occupational therapist. These specialists in dysphagia may give you exercises and instructions to help you eat safely.   Date Last Reviewed: 10/1/2016  © 4609-7718 [employer]. [address] [place] [address]. All rights reserved. This information is not intended as a substitute for professional medical care. Always follow your healthcare professional's instructions.

## 2017-11-13 ENCOUNTER — OFFICE VISIT (OUTPATIENT)
Dept: FAMILY MEDICINE | Facility: CLINIC | Age: 72
End: 2017-11-13
Payer: MEDICARE

## 2017-11-13 VITALS
HEIGHT: 66 IN | BODY MASS INDEX: 21.69 KG/M2 | SYSTOLIC BLOOD PRESSURE: 135 MMHG | WEIGHT: 135 LBS | HEART RATE: 85 BPM | DIASTOLIC BLOOD PRESSURE: 84 MMHG

## 2017-11-13 DIAGNOSIS — M54.6 THORACIC SPINE PAIN: ICD-10-CM

## 2017-11-13 DIAGNOSIS — R13.19 ESOPHAGEAL DYSPHAGIA: Primary | ICD-10-CM

## 2017-11-13 PROCEDURE — 99214 OFFICE O/P EST MOD 30 MIN: CPT | Mod: ,,, | Performed by: INTERNAL MEDICINE

## 2017-11-13 RX ORDER — ESTRADIOL 0.1 MG/G
CREAM VAGINAL DAILY
COMMUNITY
End: 2022-02-08

## 2017-11-13 NOTE — PROGRESS NOTES
Subjective:       Patient ID: Farrah Boston is a 72 y.o. female.    Chief Complaint: Choking (problem swollowing )    Ms. Farrah Boston is a 72-year-old  female who comes for follow-up. She persists to have discomfort in the upper thoracic spine while swallowing foods. She states that she cannot even swallow her own saliva without experiencing pain.      She recently had an upper GI series which was reported to be unremarkable. She has not lost any weight. No fever. Does far she does not have any diagnosis of cancers.    Father had liver cancer and great grandparents had colon cancer. Nobody had diagnosis of esophageal cancer or chest wall or thoracic cancers or lung cancers.          Past Medical History:   Diagnosis Date    Anxiety     Depression     GERD (gastroesophageal reflux disease)     Osteopenia     Reflux esophagitis     Seasonal allergies      Social History     Social History    Marital status:      Spouse name: N/A    Number of children: N/A    Years of education: N/A     Occupational History    Not on file.     Social History Main Topics    Smoking status: Never Smoker    Smokeless tobacco: Never Used    Alcohol use Yes      Comment: rarely    Drug use: No    Sexual activity: Yes     Partners: Male     Birth control/ protection: None     Other Topics Concern    Not on file     Social History Narrative    No narrative on file     Past Surgical History:   Procedure Laterality Date    ABDOMINAL ADHESION SURGERY      ANTERIOR VAGINAL REPAIR      BREAST BIOPSY      benign    CHOLECYSTECTOMY      HERNIA REPAIR      HYSTERECTOMY       Family History   Problem Relation Age of Onset    Breast cancer Maternal Aunt     Breast cancer Maternal Aunt     Hypertension Mother     Cirrhosis Father     Ovarian cancer Neg Hx        Review of Systems   Constitutional: Negative for activity change, chills, fatigue, fever and unexpected weight change.   HENT: Negative for  "congestion, postnasal drip and sinus pressure.    Eyes: Negative for pain, discharge and visual disturbance.   Respiratory: Negative for cough, chest tightness and shortness of breath.    Cardiovascular: Negative for chest pain, palpitations and leg swelling.   Gastrointestinal: Negative for abdominal distention, anal bleeding, constipation, diarrhea, nausea and vomiting.        Pain in the upper thoracic spine while swallowing food.   Endocrine:        Patient has dyslipidemia.   Genitourinary: Negative for difficulty urinating, dysuria, flank pain and frequency.   Musculoskeletal: Negative for arthralgias and joint swelling.        Patient experiences pain in thoracic spine while sw   Skin: Negative for color change, pallor and rash.   Allergic/Immunologic: Negative for environmental allergies, food allergies and immunocompromised state.   Neurological: Negative for dizziness, tremors, seizures, light-headedness and headaches.   Hematological: Negative for adenopathy. Does not bruise/bleed easily.   Psychiatric/Behavioral: Negative for agitation, confusion and dysphoric mood. The patient is not nervous/anxious.        Objective:       Vitals:    11/13/17 0945   BP: 135/84   Pulse: 85   Weight: 61.2 kg (135 lb)   Height: 5' 6" (1.676 m)     Physical Exam   Constitutional: She is oriented to person, place, and time. She appears well-developed and well-nourished. She is cooperative. No distress.   HENT:   Head: Normocephalic and atraumatic.   Eyes: Conjunctivae, EOM and lids are normal. Pupils are equal, round, and reactive to light. Lids are everted and swept, no foreign bodies found. Right pupil is round and reactive. Left pupil is round and reactive.   Neck: Trachea normal and normal range of motion. Neck supple.   Cardiovascular: Normal rate, regular rhythm, S1 normal, S2 normal and normal heart sounds.    Pulmonary/Chest: Breath sounds normal.   Abdominal: Soft. Bowel sounds are normal. There is no rigidity and " no guarding.   Musculoskeletal: Normal range of motion.        Back:    Lymphadenopathy:     She has no cervical adenopathy.   Neurological: She is alert and oriented to person, place, and time.   Skin: Skin is warm and dry.   Nursing note and vitals reviewed.      Assessment:       1. Esophageal dysphagia    2. Thoracic spine pain       Narrative     CMS MANDATED QUALITY DATA-FLUOROSCOPY - 145    Fluoroscopy Time: 1 minute 32 seconds  Number of Images: 16 images  Fluoroscopy Dose:  Unavailable at the time of this dictation        Reason: R13.10    FINDINGS:  Air-contrast esophagram shows normal esophageal distention with normal  featureless mucosa. Contrast flows through a normal GE junction. The primary  esophageal stripping wave is normal. 13 mm barium tablet passed through the  esophagus with ease. No gastroesophageal reflux occurred with Valsalva maneuver.    IMPRESSION:  Normal esophagram.    Read and electronically signed by: Wilfrido Guzman MD on 10/5/2017 2:57 PM CDT      WILFRIDO GUZMAN MD       Plan:           Esophageal dysphagia  -     Ambulatory referral to Gastroenterology    Thoracic spine pain  -     X-Ray Thoracic Spine 4 or more views; Future; Expected date: 11/13/2017  -     Ambulatory referral to Gastroenterology    And experiences some discomfort in the upper thoracic spine. No classical dysphagia. Barium swallow was unremarkable. Patient does plan to see Dr. Diego also.    I will update thoracic spine x-ray and update the patient.    Current Outpatient Prescriptions on File Prior to Visit   Medication Sig Dispense Refill    atorvastatin (LIPITOR) 10 MG tablet Take 10 mg by mouth once daily.      [DISCONTINUED] fluticasone (FLONASE) 50 mcg/actuation nasal spray 1 spray by Each Nare route once daily.      [DISCONTINUED] omeprazole (PRILOSEC) 40 MG capsule        No current facility-administered medications on file prior to visit.

## 2017-11-13 NOTE — PATIENT INSTRUCTIONS
Back Care Tips    Caring for your back  These are things you can do to prevent a recurrence of acute back pain and to reduce symptoms from chronic back pain:  · Maintain a healthy weight. If you are overweight, losing weight will help most types of back pain.  · Exercise is an important part of recovery from most types of back pain. The muscles behind and in front of the spine support the back. This means strengthening both the back muscles and the abdominal muscles will provide better support for your spine.   · Swimming and brisk walking are good overall exercises to improve your fitness level.  · Practice safe lifting methods (below).  · Practice good posture when sitting, standing and walking. Avoid prolonged sitting. This puts more stress on the lower back than standing or walking.  · Wear quality shoes with sufficient arch support. Foot and ankle alignment can affect back symptoms. Women should avoid wearing high heels.  · Therapeutic massage can help relax the back muscles without stretching them.  · During the first 24 to 72 hours after an acute injury or flare-up of chronic back pain, apply an ice pack to the painful area for 20 minutes and then remove it for 20 minutes, over a period of 60 to 90 minutes, or several times a day. As a safety precaution, do not use a heating pad at bedtime. Sleeping on a heating pad can lead to skin burns or tissue damage.  · You can alternate ice and heat therapies.  Medications  Talk to your healthcare provider before using medicines, especially if you have other medical problems or are taking other medicines.  · You may use acetaminophen or ibuprofen to control pain, unless your healthcare provider prescribed other pain medicine. If you have chronic conditions like diabetes, liver or kidney disease, stomach ulcers, or gastrointestinal bleeding, or are taking blood thinners, talk with your healthcare provider before taking any medicines.  · Be careful if you are given  prescription pain medicines, narcotics, or medicine for muscle spasm. They can cause drowsiness, affect your coordination, reflexes, and judgment. Do not drive or operate heavy machinery while taking these types of medicines. Take prescription pain medicine only as prescribed by your healthcare provider.  Lumbar stretch  Here is a simple stretching exercise that will help relax muscle spasm and keep your back more limber. If exercise makes your back pain worse, dont do it.  · Lie on your back with your knees bent and both feet on the ground.  · Slowly raise your left knee to your chest as you flatten your lower back against the floor. Hold for 5 seconds.  · Relax and repeat the exercise with your right knee.  · Do 10 of these exercises for each leg.  Safe lifting method  · Dont bend over at the waist to lift an object off the floor.  Instead, bend your knees and hips in a squat.   · Keep your back and head upright  · Hold the object close to your body, directly in front of you.  · Straighten your legs to lift the object.   · Lower the object to the floor in the reverse fashion.  · If you must slide something across the floor, push it.  Posture tips  Sitting  Sit in chairs with straight backs or low-back support. Keep your knees lower than your hips, with your feet flat on the floor.  When driving, sit up straight. Adjust the seat forward so you are not leaning toward the steering wheel.  A small pillow or rolled towel behind your lower back may help if you are driving long distances.   Standing  When standing for long periods, shift most of your weight to one leg at a time. Alternate legs every few minutes.   Sleeping  The best way to sleep is on your side with your knees bent. Put a low pillow under your head to support your neck in a neutral spine position. Avoid thick pillows that bend your neck to one side. Put a pillow between your legs to further relax your lower back. If you sleep on your back, put pillows  under your knees to support your legs in a slightly flexed position. Use a firm mattress. If your mattress sags, replace it, or use a 1/2-inch plywood board under the mattress to add support.  Follow-up care  Follow up with your healthcare provider, or as advised.  If X-rays, a CT scan or an MRI scan were taken, they will be reviewed by a radiologist. You will be notified of any new findings that may affect your care.  Call 911  Seek emergency medical care if any of the following occur:  · Trouble breathing  · Confusion  · Very drowsy  · Fainting or loss of consciousness  · Rapid or very slow heart rate  · Loss of  bowel or bladder control  When to seek medical care  Call your healthcare provider if any of the following occur:  · Pain becomes worse or spreads to your arms or legs  · Weakness or numbness in one or both arms or legs  · Numbness in the groin area  Date Last Reviewed: 6/1/2016  © 0043-5541 [email or web address]. [address] [place] [address], [place] [address]. All rights reserved. This information is not intended as a substitute for professional medical care. Always follow your healthcare professional's instructions.

## 2017-12-18 ENCOUNTER — OFFICE VISIT (OUTPATIENT)
Dept: FAMILY MEDICINE | Facility: CLINIC | Age: 72
End: 2017-12-18
Payer: MEDICARE

## 2017-12-18 VITALS
BODY MASS INDEX: 22.02 KG/M2 | WEIGHT: 137 LBS | TEMPERATURE: 98 F | HEIGHT: 66 IN | HEART RATE: 104 BPM | SYSTOLIC BLOOD PRESSURE: 137 MMHG | DIASTOLIC BLOOD PRESSURE: 88 MMHG

## 2017-12-18 DIAGNOSIS — R50.9 FEVER, UNSPECIFIED FEVER CAUSE: ICD-10-CM

## 2017-12-18 DIAGNOSIS — R05.9 COUGH: ICD-10-CM

## 2017-12-18 DIAGNOSIS — J06.9 UPPER RESPIRATORY TRACT INFECTION, UNSPECIFIED TYPE: Primary | ICD-10-CM

## 2017-12-18 DIAGNOSIS — J04.0 ACUTE LARYNGITIS: ICD-10-CM

## 2017-12-18 LAB
CTP QC/QA: YES
FLUAV AG NPH QL: NEGATIVE
FLUBV AG NPH QL: NEGATIVE

## 2017-12-18 PROCEDURE — 99213 OFFICE O/P EST LOW 20 MIN: CPT | Mod: ,,, | Performed by: INTERNAL MEDICINE

## 2017-12-18 PROCEDURE — 87804 INFLUENZA ASSAY W/OPTIC: CPT | Mod: ,,, | Performed by: INTERNAL MEDICINE

## 2017-12-18 RX ORDER — GUAIFENESIN 600 MG/1
1200 TABLET, EXTENDED RELEASE ORAL 2 TIMES DAILY
Qty: 20 TABLET | Refills: 1 | Status: SHIPPED | OUTPATIENT
Start: 2017-12-18 | End: 2019-05-27

## 2017-12-18 RX ORDER — BENZONATATE 200 MG/1
200 CAPSULE ORAL 3 TIMES DAILY PRN
Qty: 20 CAPSULE | Refills: 1 | Status: SHIPPED | OUTPATIENT
Start: 2017-12-18 | End: 2017-12-25

## 2017-12-18 RX ORDER — DICYCLOMINE HYDROCHLORIDE 20 MG/1
1 TABLET ORAL DAILY PRN
Refills: 5 | COMMUNITY
Start: 2017-12-04 | End: 2019-05-27

## 2017-12-18 RX ORDER — PREDNISONE 20 MG/1
20 TABLET ORAL DAILY
Qty: 4 TABLET | Refills: 0 | Status: SHIPPED | OUTPATIENT
Start: 2017-12-18 | End: 2017-12-22

## 2017-12-18 NOTE — PATIENT INSTRUCTIONS

## 2017-12-18 NOTE — PROGRESS NOTES
Subjective:       Patient ID: Farrah Boston is a 72 y.o. female.    Chief Complaint: Cough; Fever; Headache; URI; Eye Drainage; and Otalgia    Cough   This is a new problem. The current episode started in the past 7 days. The problem has been waxing and waning. Associated symptoms include ear pain, a fever, headaches, nasal congestion and rhinorrhea. Pertinent negatives include no chest pain, chills, hemoptysis, postnasal drip, rash or shortness of breath. She has tried OTC cough suppressant for the symptoms. The treatment provided mild relief. There is no history of asthma, COPD or environmental allergies.   Fever    Associated symptoms include coughing, ear pain and headaches. Pertinent negatives include no chest pain, congestion, diarrhea, nausea, rash, urinary pain or vomiting.   Headache    This is a new problem. The current episode started in the past 7 days. The pain quality is not similar to prior headaches. Associated symptoms include coughing, ear pain, a fever and rhinorrhea. Pertinent negatives include no dizziness, eye pain, nausea, seizures, sinus pressure or vomiting.   URI    This is a new problem. The current episode started in the past 7 days. Associated symptoms include coughing, ear pain, headaches and rhinorrhea. Pertinent negatives include no chest pain, congestion, diarrhea, dysuria, nausea, rash or vomiting.   Otalgia    Associated symptoms include coughing, headaches and rhinorrhea. Pertinent negatives include no diarrhea, rash or vomiting.       Past Medical History:   Diagnosis Date    Anxiety     Depression     GERD (gastroesophageal reflux disease)     Osteopenia     Reflux esophagitis     Seasonal allergies      Social History     Social History    Marital status:      Spouse name: N/A    Number of children: N/A    Years of education: N/A     Occupational History    Not on file.     Social History Main Topics    Smoking status: Never Smoker    Smokeless tobacco: Never  Used    Alcohol use Yes      Comment: rarely    Drug use: No    Sexual activity: Yes     Partners: Male     Birth control/ protection: None     Other Topics Concern    Not on file     Social History Narrative    No narrative on file     Past Surgical History:   Procedure Laterality Date    ABDOMINAL ADHESION SURGERY      ANTERIOR VAGINAL REPAIR      BREAST BIOPSY      benign    CHOLECYSTECTOMY      HERNIA REPAIR      HYSTERECTOMY       Family History   Problem Relation Age of Onset    Breast cancer Maternal Aunt     Breast cancer Maternal Aunt     Hypertension Mother     Cirrhosis Father     Ovarian cancer Neg Hx        Review of Systems   Constitutional: Positive for fatigue and fever. Negative for activity change, chills and unexpected weight change.   HENT: Positive for ear pain and rhinorrhea. Negative for congestion, postnasal drip and sinus pressure.    Eyes: Negative for pain, discharge and visual disturbance.   Respiratory: Positive for cough. Negative for hemoptysis, chest tightness and shortness of breath.    Cardiovascular: Negative for chest pain, palpitations and leg swelling.   Gastrointestinal: Negative for abdominal distention, anal bleeding, constipation, diarrhea, nausea and vomiting.        Pain in the upper thoracic spine while swallowing food.   Endocrine:        Patient has dyslipidemia.   Genitourinary: Negative for difficulty urinating and dysuria.   Musculoskeletal: Negative for arthralgias and joint swelling.        Patient experiences pain in thoracic spine while sw   Skin: Negative for color change, pallor and rash.   Allergic/Immunologic: Negative for environmental allergies, food allergies and immunocompromised state.   Neurological: Positive for headaches. Negative for dizziness, tremors, seizures and light-headedness.   Hematological: Negative for adenopathy. Does not bruise/bleed easily.   Psychiatric/Behavioral: Negative for agitation, confusion and dysphoric mood.  "The patient is not nervous/anxious.        Objective:       Vitals:    12/18/17 1559 12/18/17 1636   BP: (!) 141/93 137/88   Pulse: 104    Temp: 98.2 °F (36.8 °C)    Weight: 62.1 kg (137 lb)    Height: 5' 6" (1.676 m)      Physical Exam   Constitutional: She appears well-developed and well-nourished. She is cooperative. No distress.   HENT:   Head: Normocephalic.   Eyes: Conjunctivae, EOM and lids are normal. Lids are everted and swept, no foreign bodies found. Right pupil is round and reactive. Left pupil is round and reactive.   Neck: Trachea normal and normal range of motion. Neck supple.   Cardiovascular: Normal rate, regular rhythm, S1 normal and S2 normal.    Pulmonary/Chest: Breath sounds normal.   Abdominal: Soft. There is no rigidity.   Lymphadenopathy:     She has no cervical adenopathy.   Neurological: She is alert.   Skin: Skin is warm and dry.   Nursing note and vitals reviewed.      Assessment:       1. Upper respiratory tract infection, unspecified type    2. Acute laryngitis    3. Cough    4. Fever, unspecified fever cause         Plan:           Upper respiratory tract infection, unspecified type    Acute laryngitis    Cough  -     benzonatate (TESSALON) 200 MG capsule; Take 1 capsule (200 mg total) by mouth 3 (three) times daily as needed for Cough.  Dispense: 20 capsule; Refill: 1    Fever, unspecified fever cause  -     POCT Influenza A/B    Other orders  -     predniSONE (DELTASONE) 20 MG tablet; Take 1 tablet (20 mg total) by mouth once daily.  Dispense: 4 tablet; Refill: 0  -     guaiFENesin (MUCINEX) 600 mg 12 hr tablet; Take 2 tablets (1,200 mg total) by mouth 2 (two) times daily.  Dispense: 20 tablet; Refill: 1    Patient's flu test is negative. She will saltwater gargles and steam inhalation. Tylenol as needed. She can also try Advil or Motrin for headaches. Prednisone to reduce the congestion. Mucinex for softening the phlegm. Tessalon Perles for the cough. Use exposure precautions. " Personal hygiene recommended.

## 2017-12-20 ENCOUNTER — TELEPHONE (OUTPATIENT)
Dept: FAMILY MEDICINE | Facility: CLINIC | Age: 72
End: 2017-12-20

## 2017-12-29 ENCOUNTER — TELEPHONE (OUTPATIENT)
Dept: FAMILY MEDICINE | Facility: CLINIC | Age: 72
End: 2017-12-29

## 2017-12-29 DIAGNOSIS — J06.9 UPPER RESPIRATORY TRACT INFECTION, UNSPECIFIED TYPE: Primary | ICD-10-CM

## 2017-12-29 RX ORDER — PREDNISONE 20 MG/1
20 TABLET ORAL DAILY
Qty: 5 TABLET | Refills: 0 | Status: SHIPPED | OUTPATIENT
Start: 2017-12-29 | End: 2018-01-03

## 2017-12-29 RX ORDER — BENZONATATE 200 MG/1
200 CAPSULE ORAL 3 TIMES DAILY PRN
Qty: 30 CAPSULE | Refills: 1 | Status: SHIPPED | OUTPATIENT
Start: 2017-12-29 | End: 2018-01-08

## 2017-12-29 NOTE — TELEPHONE ENCOUNTER
Pt called stating she is still not feeling better. She still is hoarse and have a sore throat, ear pain, and fatigue. Is there any other suggestions she can do or should she come back in for an office visit

## 2018-01-02 RX ORDER — FLUTICASONE PROPIONATE 50 MCG
SPRAY, SUSPENSION (ML) NASAL
Qty: 16 G | Refills: 5 | Status: SHIPPED | OUTPATIENT
Start: 2018-01-02 | End: 2020-02-20

## 2018-01-31 ENCOUNTER — TELEPHONE (OUTPATIENT)
Dept: FAMILY MEDICINE | Facility: CLINIC | Age: 73
End: 2018-01-31

## 2018-03-26 ENCOUNTER — PATIENT MESSAGE (OUTPATIENT)
Dept: FAMILY MEDICINE | Facility: CLINIC | Age: 73
End: 2018-03-26

## 2018-04-05 PROBLEM — Z11.59 NEED FOR HEPATITIS C SCREENING TEST: Status: ACTIVE | Noted: 2018-02-28

## 2018-04-05 RX ORDER — TETANUS TOXOID, REDUCED DIPHTHERIA TOXOID AND ACELLULAR PERTUSSIS VACCINE, ADSORBED 5; 2.5; 8; 8; 2.5 [IU]/.5ML; [IU]/.5ML; UG/.5ML; UG/.5ML; UG/.5ML
SUSPENSION INTRAMUSCULAR
COMMUNITY
Start: 2018-01-31 | End: 2020-01-10

## 2018-05-22 ENCOUNTER — PATIENT MESSAGE (OUTPATIENT)
Dept: OBSTETRICS AND GYNECOLOGY | Facility: CLINIC | Age: 73
End: 2018-05-22

## 2018-05-24 DIAGNOSIS — Z12.31 SCREENING MAMMOGRAM, ENCOUNTER FOR: Primary | ICD-10-CM

## 2018-05-25 ENCOUNTER — HOSPITAL ENCOUNTER (OUTPATIENT)
Dept: RADIOLOGY | Facility: HOSPITAL | Age: 73
Discharge: HOME | End: 2018-05-25
Attending: OBSTETRICS & GYNECOLOGY
Payer: MEDICARE

## 2018-05-25 ENCOUNTER — OFFICE VISIT (OUTPATIENT)
Dept: OBSTETRICS AND GYNECOLOGY | Facility: CLINIC | Age: 73
End: 2018-05-25
Payer: MEDICARE

## 2018-05-25 VITALS — WEIGHT: 140 LBS | HEIGHT: 66 IN | BODY MASS INDEX: 22.5 KG/M2

## 2018-05-25 VITALS
HEIGHT: 66 IN | WEIGHT: 140.19 LBS | BODY MASS INDEX: 22.53 KG/M2 | DIASTOLIC BLOOD PRESSURE: 84 MMHG | SYSTOLIC BLOOD PRESSURE: 132 MMHG

## 2018-05-25 DIAGNOSIS — Z01.419 VISIT FOR GYNECOLOGIC EXAMINATION: Primary | ICD-10-CM

## 2018-05-25 DIAGNOSIS — Z12.31 SCREENING MAMMOGRAM, ENCOUNTER FOR: ICD-10-CM

## 2018-05-25 PROCEDURE — 77067 SCR MAMMO BI INCL CAD: CPT | Mod: TC,PN

## 2018-05-25 PROCEDURE — 99999 PR PBB SHADOW E&M-EST. PATIENT-LVL III: CPT | Mod: PBBFAC,,, | Performed by: OBSTETRICS & GYNECOLOGY

## 2018-05-25 PROCEDURE — G0101 CA SCREEN;PELVIC/BREAST EXAM: HCPCS | Mod: S$GLB,,, | Performed by: OBSTETRICS & GYNECOLOGY

## 2018-05-25 PROCEDURE — 77063 BREAST TOMOSYNTHESIS BI: CPT | Mod: 26,,, | Performed by: RADIOLOGY

## 2018-05-25 PROCEDURE — 77067 SCR MAMMO BI INCL CAD: CPT | Mod: 26,,, | Performed by: RADIOLOGY

## 2018-05-25 NOTE — PROGRESS NOTES
"Chief Complaint   Patient presents with    Gynecologic Exam       History and Physical:  No LMP recorded. Patient has had a hysterectomy.       Farrah Boston is a 73 y.o.  female who presents today for her routine annual GYN exam. The patient has no Gynecology complaints today. No bowel or bladder complaints.       Allergies:   Review of patient's allergies indicates:   Allergen Reactions    Morphine Shortness Of Breath     Patient states her" breathing stops"    Codeine Nausea And Vomiting       Past Medical History:   Diagnosis Date    Anxiety     Depression     GERD (gastroesophageal reflux disease)     Need for hepatitis C screening test 2018    Negative    Osteopenia     Reflux esophagitis     Seasonal allergies        Past Surgical History:   Procedure Laterality Date    ABDOMINAL ADHESION SURGERY      ANTERIOR VAGINAL REPAIR      BREAST BIOPSY      benign    CHOLECYSTECTOMY      HERNIA REPAIR      HYSTERECTOMY         MEDS:   Current Outpatient Prescriptions on File Prior to Visit   Medication Sig Dispense Refill    atorvastatin (LIPITOR) 10 MG tablet Take 10 mg by mouth once daily.      estradiol (ESTRACE) 0.01 % (0.1 mg/gram) vaginal cream Place vaginally once daily.      fluticasone (FLONASE) 50 mcg/actuation nasal spray 2 SPRAYS IN EACH NOSTRIL ONCE DAILY 16 g 5    BOOSTRIX TDAP 2.5-8-5 Lf-mcg-Lf/0.5mL Syrg injection       dicyclomine (BENTYL) 20 mg tablet 1 tablet daily as needed.  5    guaiFENesin (MUCINEX) 600 mg 12 hr tablet Take 2 tablets (1,200 mg total) by mouth 2 (two) times daily. 20 tablet 1     No current facility-administered medications on file prior to visit.        OB History      Para Term  AB Living    4 3     1      SAB TAB Ectopic Multiple Live Births    1                  Social History     Social History    Marital status:      Spouse name: N/A    Number of children: N/A    Years of education: N/A     Occupational History    " "Not on file.     Social History Main Topics    Smoking status: Never Smoker    Smokeless tobacco: Never Used    Alcohol use Yes      Comment: rarely    Drug use: No    Sexual activity: Yes     Partners: Male     Birth control/ protection: None     Other Topics Concern    Not on file     Social History Narrative    No narrative on file       Family History   Problem Relation Age of Onset    Breast cancer Maternal Aunt     Breast cancer Maternal Aunt     Hypertension Mother     Cirrhosis Father     Ovarian cancer Neg Hx          Past medical and surgical history reviewed.   I have reviewed the patient's medical history in detail and updated the computerized patient record.        Review of System:   General: no chills, fever, night sweats, weight gain or weight loss  Psychological: no depression or suicidal ideation  Breasts: no new or changing breast lumps, nipple discharge or masses.  Respiratory: no cough, shortness of breath, or wheezing  Cardiovascular: no chest pain or dyspnea on exertion  Gastrointestinal: no abdominal pain, change in bowel habits, or black or bloody stools  Genito-Urinary: no incontinence, urinary frequency/urgency or vulvar/vaginal symptoms, pelvic pain or abnormal vaginal bleeding.  Musculoskeletal: no gait disturbance or muscular weakness      Physical Exam:   /84   Ht 5' 6" (1.676 m)   Wt 63.6 kg (140 lb 3.4 oz)   BMI 22.63 kg/m²   Constitutional: She is oriented to person, place, and time. She appears well-developed and well-nourished. No distress.   HENT:   Head: Normocephalic and atraumatic.   Eyes: Conjunctivae and EOM are normal. No scleral icterus.   Neck: Normal range of motion. Neck supple. No tracheal deviation present.   Cardiovascular: Normal rate.    Pulmonary/Chest: Effort normal. No respiratory distress. She exhibits no tenderness.  Breasts: are symmetrical.   Right breast exhibits no inverted nipple, no mass, no nipple discharge, no skin change and no " tenderness.   Left breast exhibits no inverted nipple, no mass, no nipple discharge, no skin change and no tenderness.  Abdominal: Soft. She exhibits no distension and no mass. There is no tenderness. There is no rebound and no guarding.   Genitourinary:    External rectal exam shows no thrombosed external hemorrhoids.    Pelvic exam was performed with patient supine.   No labial fusion.   There is no rash, lesion or injury on the right labia.   There is no rash, lesion or injury on the left labia.   No bleeding and no signs of injury around the vaginal introitus, urethra is without lesions and well supported.    No vaginal discharge found.    No significant Cystocele, Enterocele or rectocele, and cuff well supported.   Bimanual exam:   The urethra and vagina are without palpable masses or tenderness.   Uterus and cervix are surgically absents, vaginal cuff is intact and well supported.   Right adnexum displays no mass and no tenderness.   Left adnexum displays no mass and no tenderness.  Musculoskeletal: Normal range of motion.   Lymphadenopathy: No inguinal adenopathy present.   Neurological: She is alert and oriented to person, place, and time. Coordination normal.   Skin: Skin is warm and dry. She is not diaphoretic.   Psychiatric: She has a normal mood and affect.        Assessment:   Normal annual GYN exam  Continue vaginal estrogen    Plan:   PAP not needed  Mammogram  Estrace, [id number]  Follow up in 1 year.

## 2018-10-15 RX ORDER — ESTRADIOL 0.1 MG/G
CREAM VAGINAL
Qty: 42.5 G | Refills: 4 | Status: SHIPPED | OUTPATIENT
Start: 2018-10-15 | End: 2019-05-27

## 2019-05-21 ENCOUNTER — TELEPHONE (OUTPATIENT)
Dept: OBSTETRICS AND GYNECOLOGY | Facility: CLINIC | Age: 74
End: 2019-05-21

## 2019-05-21 DIAGNOSIS — Z12.39 SCREENING BREAST EXAMINATION: Primary | ICD-10-CM

## 2019-05-21 NOTE — TELEPHONE ENCOUNTER
----- Message from Jessica Alvarado sent at 5/21/2019  3:22 PM CDT -----  Type:  Mammogram    Caller is requesting to schedule their annual mammogram appointment.  Order is not listed in EPIC.  Please enter order and contact patient to schedule.    Name of Caller:  Patient   Where would they like the mammogram performed?  [facility] Call Back Number:  [phone]  Additional Information:

## 2019-05-27 ENCOUNTER — OFFICE VISIT (OUTPATIENT)
Dept: OBSTETRICS AND GYNECOLOGY | Facility: CLINIC | Age: 74
End: 2019-05-27
Payer: MEDICARE

## 2019-05-27 ENCOUNTER — HOSPITAL ENCOUNTER (OUTPATIENT)
Dept: RADIOLOGY | Facility: HOSPITAL | Age: 74
Discharge: HOME | End: 2019-05-27
Attending: OBSTETRICS & GYNECOLOGY
Payer: MEDICARE

## 2019-05-27 VITALS — WEIGHT: 137 LBS | BODY MASS INDEX: 22.02 KG/M2 | HEIGHT: 66 IN

## 2019-05-27 VITALS — DIASTOLIC BLOOD PRESSURE: 74 MMHG | BODY MASS INDEX: 22.2 KG/M2 | SYSTOLIC BLOOD PRESSURE: 120 MMHG | WEIGHT: 137.56 LBS

## 2019-05-27 DIAGNOSIS — Z12.39 SCREENING FOR BREAST CANCER: Primary | ICD-10-CM

## 2019-05-27 DIAGNOSIS — Z12.39 SCREENING BREAST EXAMINATION: ICD-10-CM

## 2019-05-27 PROCEDURE — 77063 MAMMO DIGITAL SCREENING BILAT WITH TOMOSYNTHESIS_CAD: ICD-10-PCS | Mod: 26,,, | Performed by: RADIOLOGY

## 2019-05-27 PROCEDURE — 77067 MAMMO DIGITAL SCREENING BILAT WITH TOMOSYNTHESIS_CAD: ICD-10-PCS | Mod: 26,,, | Performed by: RADIOLOGY

## 2019-05-27 PROCEDURE — 77067 SCR MAMMO BI INCL CAD: CPT | Mod: TC,PN

## 2019-05-27 PROCEDURE — 77067 SCR MAMMO BI INCL CAD: CPT | Mod: 26,,, | Performed by: RADIOLOGY

## 2019-05-27 PROCEDURE — 99999 PR PBB SHADOW E&M-EST. PATIENT-LVL III: CPT | Mod: PBBFAC,,, | Performed by: OBSTETRICS & GYNECOLOGY

## 2019-05-27 PROCEDURE — G0101 PR CA SCREEN;PELVIC/BREAST EXAM: ICD-10-PCS | Mod: S$GLB,,, | Performed by: OBSTETRICS & GYNECOLOGY

## 2019-05-27 PROCEDURE — 77063 BREAST TOMOSYNTHESIS BI: CPT | Mod: 26,,, | Performed by: RADIOLOGY

## 2019-05-27 PROCEDURE — G0101 CA SCREEN;PELVIC/BREAST EXAM: HCPCS | Mod: S$GLB,,, | Performed by: OBSTETRICS & GYNECOLOGY

## 2019-05-27 PROCEDURE — 99999 PR PBB SHADOW E&M-EST. PATIENT-LVL III: ICD-10-PCS | Mod: PBBFAC,,, | Performed by: OBSTETRICS & GYNECOLOGY

## 2019-05-27 NOTE — PROGRESS NOTES
"Chief Complaint   Patient presents with    Well Woman       History and Physical:  No LMP recorded. Patient has had a hysterectomy.       Farrah Boston is a 74 y.o.  female who presents today for her routine annual GYN exam. The patient has no Gynecology complaints today. No bowel or bladder complaints. Estrace cream occasionally.       Allergies:   Review of patient's allergies indicates:   Allergen Reactions    Morphine Shortness Of Breath     Patient states her" breathing stops"    Codeine Nausea And Vomiting       Past Medical History:   Diagnosis Date    Anxiety     Depression     GERD (gastroesophageal reflux disease)     Need for hepatitis C screening test 2/28/2018    Negative    Osteopenia     Reflux esophagitis     Seasonal allergies        Past Surgical History:   Procedure Laterality Date    ABDOMINAL ADHESION SURGERY      ANTERIOR VAGINAL REPAIR      BREAST BIOPSY      benign    CHOLECYSTECTOMY      HERNIA REPAIR      HYSTERECTOMY         MEDS:   Current Outpatient Medications on File Prior to Visit   Medication Sig Dispense Refill    atorvastatin (LIPITOR) 10 MG tablet Take 10 mg by mouth once daily.      BOOSTRIX TDAP 2.5-8-5 Lf-mcg-Lf/0.5mL Syrg injection       coQ10, ubiquinol, 200 mg Cap CoQ10      estradiol (ESTRACE) 0.01 % (0.1 mg/gram) vaginal cream Place vaginally once daily.      fluticasone (FLONASE) 50 mcg/actuation nasal spray 2 SPRAYS IN EACH NOSTRIL ONCE DAILY 16 g 5    multivit-min/iron/folic/lutein (CENTRUM SILVER WOMEN ORAL) Centrum Silver      [DISCONTINUED] dicyclomine (BENTYL) 20 mg tablet 1 tablet daily as needed.  5    [DISCONTINUED] ESTRACE 0.01 % (0.1 mg/gram) vaginal cream INSERT 2 GRAMS VAGINALLY TWICE A WEEK 42.5 g 4    [DISCONTINUED] guaiFENesin (MUCINEX) 600 mg 12 hr tablet Take 2 tablets (1,200 mg total) by mouth 2 (two) times daily. 20 tablet 1     No current facility-administered medications on file prior to visit.        OB History     "    7    Para   6    Term   3            AB   1    Living           SAB   1    TAB        Ectopic        Multiple        Live Births                     Social History     Socioeconomic History    Marital status:      Spouse name: Not on file    Number of children: Not on file    Years of education: Not on file    Highest education level: Not on file   Occupational History    Not on file   Social Needs    Financial resource strain: Not on file    Food insecurity:     Worry: Not on file     Inability: Not on file    Transportation needs:     Medical: Not on file     Non-medical: Not on file   Tobacco Use    Smoking status: Never Smoker    Smokeless tobacco: Never Used   Substance and Sexual Activity    Alcohol use: Yes     Comment: rarely    Drug use: No    Sexual activity: Yes     Partners: Male     Birth control/protection: None   Lifestyle    Physical activity:     Days per week: Not on file     Minutes per session: Not on file    Stress: Not on file   Relationships    Social connections:     Talks on phone: Not on file     Gets together: Not on file     Attends [employer]ai service: Not on file     Active member of club or organization: Not on file     Attends meetings of clubs or organizations: Not on file     Relationship status: Not on file   Other Topics Concern    Not on file   Social History Narrative    Not on file       Family History   Problem Relation Age of Onset    Breast cancer Maternal Aunt     Breast cancer Maternal Aunt     Hypertension Mother     Cirrhosis Father     Breast cancer Paternal Grandmother     Ovarian cancer Neg Hx          Past medical and surgical history reviewed.   I have reviewed the patient's medical history in detail and updated the computerized patient record.        Review of System:   General: no chills, fever, night sweats, weight gain or weight loss  Psychological: no depression or suicidal ideation  Breasts: no new or changing  breast lumps, nipple discharge or masses.  Respiratory: no cough, shortness of breath, or wheezing  Cardiovascular: no chest pain or dyspnea on exertion  Gastrointestinal: no abdominal pain, change in bowel habits, or black or bloody stools  Genito-Urinary: no incontinence, urinary frequency/urgency or vulvar/vaginal symptoms, pelvic pain or abnormal vaginal bleeding.  Musculoskeletal: no gait disturbance or muscular weakness      Physical Exam:   /74   Wt 62.4 kg (137 lb 9.1 oz)   BMI 22.20 kg/m²   Constitutional: She is oriented to person, place, and time. She appears well-developed and well-nourished. No distress.  HENT:   Head: Normocephalic and atraumatic.   Eyes: Conjunctivae and EOM are normal. No scleral icterus.   Neck: Normal range of motion. Neck supple. No tracheal deviation present.   Cardiovascular: Normal rate.    Pulmonary/Chest: Effort normal. No respiratory distress. She exhibits no tenderness.  Breasts: are symmetrical.   Right breast exhibits no inverted nipple, no mass, no nipple discharge, no skin change and no tenderness.   Left breast exhibits no inverted nipple, no mass, no nipple discharge, no skin change and no tenderness.  Abdominal: Soft. She exhibits no distension and no mass. There is no tenderness. There is no rebound and no guarding.   Genitourinary:    External rectal exam shows no thrombosed external hemorrhoids.    Pelvic exam was performed with patient supine.   No labial fusion.   There is no rash, lesion or injury on the right labia.   There is no rash, lesion or injury on the left labia.   No bleeding and no signs of injury around the vaginal introitus, urethra is without lesions and well supported.    No vaginal discharge found. Pale and atrophic - foreshortened.    No significant Cystocele, Enterocele or rectocele, and cuff well supported.   Bimanual exam:   The urethra and vagina are without palpable masses or tenderness.   Uterus and cervix are surgically absents,  vaginal cuff is intact and well supported.   Right adnexum displays no mass and no tenderness.   Left adnexum displays no mass and no tenderness.  Musculoskeletal: Normal range of motion.   Lymphadenopathy: No inguinal adenopathy present.   Neurological: She is alert and oriented to person, place, and time. Coordination normal.   Skin: Skin is warm and dry. She is not diaphoretic.   Psychiatric: She has a normal mood and affect.        Assessment:   Normal annual GYN exam  1. Screening for breast cancer     vaginal atrophy - estrace as needed     Plan:   PAP Not Needed  Mammogram  Follow up in 1 year.

## 2019-05-28 ENCOUNTER — PATIENT MESSAGE (OUTPATIENT)
Dept: OBSTETRICS AND GYNECOLOGY | Facility: CLINIC | Age: 74
End: 2019-05-28

## 2019-11-27 ENCOUNTER — LAB VISIT (OUTPATIENT)
Dept: LAB | Facility: HOSPITAL | Age: 74
End: 2019-11-27
Attending: INTERNAL MEDICINE
Payer: MEDICARE

## 2019-11-27 DIAGNOSIS — E78.5 HYPERLIPEMIA: Primary | ICD-10-CM

## 2019-11-27 DIAGNOSIS — Z79.899 ENCOUNTER FOR LONG-TERM (CURRENT) USE OF OTHER MEDICATIONS: ICD-10-CM

## 2019-11-27 DIAGNOSIS — K21.9 ESOPHAGEAL REFLUX: ICD-10-CM

## 2019-11-27 LAB
ALBUMIN SERPL BCP-MCNC: 4.6 G/DL (ref 3.5–5.2)
ALP SERPL-CCNC: 75 U/L (ref 55–135)
ALT SERPL W/O P-5'-P-CCNC: 37 U/L (ref 10–44)
AST SERPL-CCNC: 27 U/L (ref 10–40)
BILIRUB DIRECT SERPL-MCNC: 0.1 MG/DL (ref 0.1–0.3)
BILIRUB SERPL-MCNC: 0.8 MG/DL (ref 0.1–1)
CHOLEST SERPL-MCNC: 152 MG/DL (ref 120–199)
CHOLEST/HDLC SERPL: 2.8 {RATIO} (ref 2–5)
HDLC SERPL-MCNC: 54 MG/DL (ref 40–75)
HDLC SERPL: 35.5 % (ref 20–50)
LDLC SERPL CALC-MCNC: 79 MG/DL (ref 63–159)
NONHDLC SERPL-MCNC: 98 MG/DL
PROT SERPL-MCNC: 7.6 G/DL (ref 6–8.4)
TRIGL SERPL-MCNC: 95 MG/DL (ref 30–150)

## 2019-11-27 PROCEDURE — 36415 COLL VENOUS BLD VENIPUNCTURE: CPT

## 2019-11-27 PROCEDURE — 80076 HEPATIC FUNCTION PANEL: CPT

## 2019-11-27 PROCEDURE — 80061 LIPID PANEL: CPT

## 2019-12-24 ENCOUNTER — OFFICE VISIT (OUTPATIENT)
Dept: FAMILY MEDICINE | Facility: CLINIC | Age: 74
End: 2019-12-24
Payer: MEDICARE

## 2019-12-24 VITALS
HEART RATE: 87 BPM | BODY MASS INDEX: 22.8 KG/M2 | TEMPERATURE: 98 F | WEIGHT: 141.88 LBS | OXYGEN SATURATION: 97 % | HEIGHT: 66 IN | DIASTOLIC BLOOD PRESSURE: 80 MMHG | SYSTOLIC BLOOD PRESSURE: 146 MMHG

## 2019-12-24 DIAGNOSIS — S33.9XXA SPRAIN OF LIGAMENT OF LUMBOSACRAL JOINT, INITIAL ENCOUNTER: ICD-10-CM

## 2019-12-24 PROCEDURE — 1125F AMNT PAIN NOTED PAIN PRSNT: CPT | Mod: S$GLB,,, | Performed by: FAMILY MEDICINE

## 2019-12-24 PROCEDURE — 1101F PR PT FALLS ASSESS DOC 0-1 FALLS W/OUT INJ PAST YR: ICD-10-PCS | Mod: S$GLB,,, | Performed by: FAMILY MEDICINE

## 2019-12-24 PROCEDURE — 1101F PT FALLS ASSESS-DOCD LE1/YR: CPT | Mod: S$GLB,,, | Performed by: FAMILY MEDICINE

## 2019-12-24 PROCEDURE — 1125F PR PAIN SEVERITY QUANTIFIED, PAIN PRESENT: ICD-10-PCS | Mod: S$GLB,,, | Performed by: FAMILY MEDICINE

## 2019-12-24 PROCEDURE — 1159F PR MEDICATION LIST DOCUMENTED IN MEDICAL RECORD: ICD-10-PCS | Mod: S$GLB,,, | Performed by: FAMILY MEDICINE

## 2019-12-24 PROCEDURE — 1159F MED LIST DOCD IN RCRD: CPT | Mod: S$GLB,,, | Performed by: FAMILY MEDICINE

## 2019-12-24 PROCEDURE — 99214 PR OFFICE/OUTPT VISIT, EST, LEVL IV, 30-39 MIN: ICD-10-PCS | Mod: S$GLB,,, | Performed by: FAMILY MEDICINE

## 2019-12-24 PROCEDURE — 99214 OFFICE O/P EST MOD 30 MIN: CPT | Mod: S$GLB,,, | Performed by: FAMILY MEDICINE

## 2019-12-24 RX ORDER — CYCLOBENZAPRINE HCL 5 MG
5 TABLET ORAL NIGHTLY
Qty: 10 TABLET | Refills: 0 | Status: SHIPPED | OUTPATIENT
Start: 2019-12-24 | End: 2020-01-03

## 2019-12-24 NOTE — PROGRESS NOTES
"Subjective:       Patient ID: Farrah Boston is a 74 y.o. female.    Chief Complaint: Low-back Pain (x 1 week, since twisted)      Patient reports approximately 1 week ago through her back out twisting to her right to saw something into the back seat, worse the next day.  Has had pusher aged mother [place] airport in [place] and did not help her back at that time.  History of a fractured pelvis years ago.  She has a back brace which has worked well in the past but is not working this time.    Low-back Pain   This is a recurrent problem. The current episode started in the past 7 days. The problem occurs constantly. The problem has been unchanged. Associated symptoms include joint swelling. Pertinent negatives include no abdominal pain, anorexia, arthralgias, change in bowel habit, chest pain, chills, congestion, coughing, diaphoresis, fatigue, fever, headaches, myalgias, nausea, neck pain, numbness, rash, sore throat, swollen glands, urinary symptoms, vertigo, visual change, vomiting or weakness. Nothing aggravates the symptoms. She has tried acetaminophen, NSAIDs and ice for the symptoms. The treatment provided no relief.       Allergies and Medications:   Review of patient's allergies indicates:   Allergen Reactions    Morphine Shortness Of Breath     Patient states her" breathing stops"    Codeine Nausea And Vomiting     Current Outpatient Medications   Medication Sig Dispense Refill    atorvastatin (LIPITOR) 10 MG tablet Take 10 mg by mouth once daily.      coQ10, ubiquinol, 200 mg Cap CoQ10      estradiol (ESTRACE) 0.01 % (0.1 mg/gram) vaginal cream Place vaginally once daily.      fluticasone (FLONASE) 50 mcg/actuation nasal spray 2 SPRAYS IN EACH NOSTRIL ONCE DAILY 16 g 5    multivit-min/iron/folic/lutein (CENTRUM SILVER WOMEN ORAL) Centrum Silver      BOOSTRIX TDAP 2.5-8-5 Lf-mcg-Lf/0.5mL Syrg injection       cyclobenzaprine (FLEXERIL) 5 MG tablet Take 1 tablet (5 mg total) by mouth nightly. " for 10 days 10 tablet 0    FLUZONE HIGH-DOSE 2019-20, PF, 180 mcg/0.5 mL Syrg ADM 0.5ML IM UTD  0     No current facility-administered medications for this visit.        Family History:   Family History   Problem Relation Age of Onset    Breast cancer Maternal Aunt 70    Breast cancer Maternal Aunt 80    Hypertension Mother     Cirrhosis Father     Breast cancer Paternal Grandmother 35    Ovarian cancer Neg Hx        Social History:   Social History     Socioeconomic History    Marital status:      Spouse name: Not on file    Number of children: Not on file    Years of education: Not on file    Highest education level: Not on file   Occupational History    Not on file   Social Needs    Financial resource strain: Not on file    Food insecurity:     Worry: Not on file     Inability: Not on file    Transportation needs:     Medical: Not on file     Non-medical: Not on file   Tobacco Use    Smoking status: Never Smoker    Smokeless tobacco: Never Used   Substance and Sexual Activity    Alcohol use: Yes     Comment: rarely    Drug use: No    Sexual activity: Yes     Partners: Male     Birth control/protection: None   Lifestyle    Physical activity:     Days per week: Not on file     Minutes per session: Not on file    Stress: Not on file   Relationships    Social connections:     Talks on phone: Not on file     Gets together: Not on file     Attends [employer] service: Not on file     Active member of club or organization: Not on file     Attends meetings of clubs or organizations: Not on file     Relationship status: Not on file   Other Topics Concern    Not on file   Social History Narrative    Not on file       Review of Systems   Constitutional: Negative for chills, diaphoresis, fatigue and fever.   HENT: Negative for congestion and sore throat.    Respiratory: Negative for cough.    Cardiovascular: Negative for chest pain.   Gastrointestinal: Negative for abdominal pain, anorexia,  change in bowel habit, nausea and vomiting.   Musculoskeletal: Positive for joint swelling. Negative for arthralgias, myalgias and neck pain.   Skin: Negative for rash.   Neurological: Negative for vertigo, weakness, numbness and headaches.       Objective:     Vitals:    12/24/19 1049   BP: (!) 146/80   Pulse: 87   Temp: 98.4 °F (36.9 °C)        Physical Exam   Musculoskeletal:        Back:        Assessment:       1. Sprain of ligament of lumbosacral joint, initial encounter        Plan:       Farrah was seen today for low-back pain.    Diagnoses and all orders for this visit:    Sprain of ligament of lumbosacral joint, initial encounter  -     cyclobenzaprine (FLEXERIL) 5 MG tablet; Take 1 tablet (5 mg total) by mouth nightly. for 10 days         Follow up in about 2 weeks (around 1/7/2020), or if symptoms worsen or fail to improve Dr Lomeli..

## 2019-12-24 NOTE — PATIENT INSTRUCTIONS
Exercises to Strengthen Your Lower Back  Strong lower back and abdominal muscles work together to support your spine. The exercises below will help strengthen the lower back. It is important that you begin exercising slowly and increase levels gradually.  Always begin any exercise program with stretching. If you feel pain while doing any of these exercises, stop and talk to your doctor about a more specific exercise program that better suits your condition.   Low back stretch  The point of stretching is to make you more flexible and increase your range of motion. Stretch only as much as you are able. Stretch slowly. Do not push your stretch to the limit. If at any point you feel pain while stretching, this is your (temporary) limit.  · Lie on your back with your knees bent and both feet on the ground.  · Slowly raise your left knee to your chest as you flatten your lower back against the floor. Hold for 5 seconds.  · Relax and repeat the exercise with your right knee.  · Do 10 of these exercises for each leg.  · Repeat hugging both knees to your chest at the same time.  Building lower back strength  Start your exercise routine with 10 to 30 minutes a day, 1 to 3 times a day.  Initial exercises  Lying on your back:  1. Ankle pumps: Move your foot up and down, towards your head, and then away. Repeat 10 times with each foot.  2. Heel slides: Slowly bend your knee, drawing the heel of your foot towards you. Then slide your heel/foot from you, straightening your knee. Do not lift your foot off the floor (this is not a leg lift).  3. Abdominal contraction: Bend your knees and put your hands on your stomach. Tighten your stomach muscles. Hold for 5 seconds, then relax. Repeat 10 times.  4. Straight leg raise: Bend one leg at the knee and keep the other leg straight. Tighten your stomach muscles. Slowly lift your straight leg 6 to 12 inches off the floor and hold for up to 5 seconds. Repeat 10 times on each  side.  Standin. Wall squats: Stand with your back against the wall. Move your feet about 12 inches away from the wall. Tighten your stomach muscles, and slowly bend your knees until they are at about a 45 degree angle. Do not go down too far. Hold about 5 seconds. Then slowly return to your starting position. Repeat 10 times.  2. Heel raises: Stand facing the wall. Slowly raise the heels of your feet up and down, while keeping your toes on the floor. If you have trouble balancing, you can touch the wall with your hands. Repeat 10 times.  More advanced exercises  When you feel comfortable enough, try these exercises.  1. Kneeling lumbar extension: Begin on your hands and knees. At the same time, raise and straighten your right arm and left leg until they are parallel to the ground. Hold for 2 seconds and come back slowly to a starting position. Repeat with left arm and right leg, alternating 10 times.  2. Prone lumbar extension: Lie face down, arms extended overhead, palms on the floor. At the same time, raise your right arm and left leg as high as comfortably possible. Hold for 10 seconds and slowly return to start. Repeat with left arm and right leg, alternating 10 times. Gradually build up to 20 times. (Advanced: Repeat this exercise raising both arms and both legs a few inches off the floor at the same time. Hold for 5 seconds and release.)  3. Pelvic tilt: Lie on the floor on your back with your knees bent at 90 degrees. Your feet should be flat on the floor. Inhale, exhale, then slowly contract your abdominal muscles bringing your navel toward your spine. Let your pelvis rock back until your lower back is flat on the floor. Hold for 10 seconds while breathing smoothly.  4. Abdominal crunch: Perform a pelvic tilt (above) flattening your lower back against the floor. Holding the tension in your abdominal muscles, take another breath and raise your shoulder blades off the ground (this is not a full sit-up).  Keep your head in line with your body (dont bend your neck forward). Hold for 2 seconds, then slowly lower.  Date Last Reviewed: 6/1/2016 © 2000-2017 [employer]. [address] [place] [address], [place] [address]. All rights reserved. This information is not intended as a substitute for professional medical care. Always follow your healthcare professional's instructions.    Ice Massage    Fill a dozen 6 oz. Paper cups with water and freeze them. When ready for massage, peel the paper from one frozen cup and wet it. Patient lies on stomach while the masseur rolls the ice cylinder over the sore tight muscles. When the patient can no longer tolerate the massage (usually 5-10 minutes), discard the ice. Patient lies prone for another five minutes to allow warming and blood flow into the muscles. Repeat 2-3 times per day.

## 2020-01-10 ENCOUNTER — OFFICE VISIT (OUTPATIENT)
Dept: FAMILY MEDICINE | Facility: CLINIC | Age: 75
End: 2020-01-10
Payer: MEDICARE

## 2020-01-10 VITALS
SYSTOLIC BLOOD PRESSURE: 139 MMHG | DIASTOLIC BLOOD PRESSURE: 89 MMHG | BODY MASS INDEX: 22.5 KG/M2 | HEIGHT: 66 IN | HEART RATE: 73 BPM | WEIGHT: 140 LBS

## 2020-01-10 DIAGNOSIS — S33.9XXD SPRAIN OF LIGAMENT OF LUMBOSACRAL JOINT, SUBSEQUENT ENCOUNTER: Primary | ICD-10-CM

## 2020-01-10 DIAGNOSIS — Z23 NEED FOR PNEUMOCOCCAL VACCINATION: ICD-10-CM

## 2020-01-10 PROCEDURE — G0009 ADMIN PNEUMOCOCCAL VACCINE: HCPCS | Mod: S$GLB,,, | Performed by: INTERNAL MEDICINE

## 2020-01-10 PROCEDURE — 1159F PR MEDICATION LIST DOCUMENTED IN MEDICAL RECORD: ICD-10-PCS | Mod: S$GLB,,, | Performed by: INTERNAL MEDICINE

## 2020-01-10 PROCEDURE — 1159F MED LIST DOCD IN RCRD: CPT | Mod: S$GLB,,, | Performed by: INTERNAL MEDICINE

## 2020-01-10 PROCEDURE — 99213 PR OFFICE/OUTPT VISIT, EST, LEVL III, 20-29 MIN: ICD-10-PCS | Mod: 25,S$GLB,, | Performed by: INTERNAL MEDICINE

## 2020-01-10 PROCEDURE — 1101F PT FALLS ASSESS-DOCD LE1/YR: CPT | Mod: S$GLB,,, | Performed by: INTERNAL MEDICINE

## 2020-01-10 PROCEDURE — 1170F FXNL STATUS ASSESSED: CPT | Mod: S$GLB,,, | Performed by: INTERNAL MEDICINE

## 2020-01-10 PROCEDURE — 90670 PNEUMOCOCCAL CONJUGATE VACCINE 13-VALENT LESS THAN 5YO & GREATER THAN: ICD-10-PCS | Mod: S$GLB,,, | Performed by: INTERNAL MEDICINE

## 2020-01-10 PROCEDURE — 1101F PR PT FALLS ASSESS DOC 0-1 FALLS W/OUT INJ PAST YR: ICD-10-PCS | Mod: S$GLB,,, | Performed by: INTERNAL MEDICINE

## 2020-01-10 PROCEDURE — 90670 PCV13 VACCINE IM: CPT | Mod: S$GLB,,, | Performed by: INTERNAL MEDICINE

## 2020-01-10 PROCEDURE — 99213 OFFICE O/P EST LOW 20 MIN: CPT | Mod: 25,S$GLB,, | Performed by: INTERNAL MEDICINE

## 2020-01-10 PROCEDURE — 1126F PR PAIN SEVERITY QUANTIFIED, NO PAIN PRESENT: ICD-10-PCS | Mod: S$GLB,,, | Performed by: INTERNAL MEDICINE

## 2020-01-10 PROCEDURE — 1170F PR FUNCTIONAL STATUS ASSESSED: ICD-10-PCS | Mod: S$GLB,,, | Performed by: INTERNAL MEDICINE

## 2020-01-10 PROCEDURE — 1126F AMNT PAIN NOTED NONE PRSNT: CPT | Mod: S$GLB,,, | Performed by: INTERNAL MEDICINE

## 2020-01-10 PROCEDURE — G0009 PNEUMOCOCCAL CONJUGATE VACCINE 13-VALENT LESS THAN 5YO & GREATER THAN: ICD-10-PCS | Mod: S$GLB,,, | Performed by: INTERNAL MEDICINE

## 2020-01-10 NOTE — PATIENT INSTRUCTIONS
Back Safety: Lifting  Lifting can strain or even injure your back. Follow these tips to keep your back safe while you bend, lift, and carry.      Protect Your Back While Lifting       Step 1:  · Face the object.  · With your back straight, get down on one knee.  · If you can, tilt the object so one side lifts off the ground.  · Keep the object close to you. Step 2:  · Tighten your stomach muscles.  · Use your legs, arms, and buttocks to lift, not your back.  · Avoid twisting.  · Lift the object to your knee.  · Grasp the object firmly. Step 3:  · Lift with your arms and legs, not your back.  · Move quickly to help make this easier.   To carry an object:  · Hold it close to your body.  · Bend your knees slightly as you walk. The heavier the object, the more you should bend your knees.  · Get help with heavy or unbalanced objects.  Date Last Reviewed: 8/31/2015  © 0644-4670 [employer]. [address] [place] [address], [place] [address]. All rights reserved. This information is not intended as a substitute for professional medical care. Always follow your healthcare professional's instructions.        Caring for Your Back Throughout the Day  Take care of your back throughout the day. You will likely have fewer back problems if you do. Try to warm up before you move. Shift positions often. Also do your best to form healthy habits.    Warm up for the day  Do a few slow, catlike stretches before starting your day. This simple warmup can soften your disks, stretch your back muscles, and help prevent injuries.  Shift positions often  At work and at home, change positions often. This helps keep your body from getting stiff. Stand up or lean back while you sit. If you can, get up and move every 1/2 hour.  Form healthy habits  Here are some suggestions:   · Keep a healthy weight. When you weigh too much, your back is under excess strain. But losing just a few extra pounds can help a lot.  · Try not to overeat. Learn  about serving sizes. The size of a serving depends on the food and the food group. Many foods list serving sizes on the labels.  · Handle minor aches with cold and heat. Apply cold the first 24 to 48 hours. Use heat after that. Always place a thin cloth between your skin and the source of cold or heat.  · Take medicines as directed. This helps keep pain under control. Always read labels, and call your healthcare provider or pharmacist if you have any questions.  Walk each day  A daily walk keeps your back and thigh muscles stretched and strong. This gives your back better support. Be sure to walk with your spines three curves aligned, by keeping your head, hips, and toes connected by a vertical line.   Date Last Reviewed: 10/18/2015  © 6091-7798 [employer], [employer]. [address] [place] [address], [place] [address]. All rights reserved. This information is not intended as a substitute for professional medical care. Always follow your healthcare professional's instructions.

## 2020-01-10 NOTE — PROGRESS NOTES
Subjective:       Patient ID: Farrah Boston is a 74 y.o. female.    Chief Complaint: Back Pain (saw adams pain gone )    Miss Farrah Boston is a 74-year-old  female who was seen a few weeks ago by my colleague Dr. Harpreet Pepper.  She had low back pain and she was treated conservatively.  She is feeling much better from her back pain.    Again reiterating, there is no history of bowel or bladder disturbance, fevers or any history of trauma or injury.  She is not on any anticoagulation and she does not have any history of cancers.    Blood pressures at arrival and according to her especially in my office tend to be somewhat elevated.  At home they are pristine.  At other doctor's offices also they are pristine.  She states that she has been psychologically primed and conditioned to have elevated blood pressures in my office when I told her for the 1st time on her 1st visit that her blood pressures were elevated.  She cannot shake that off and a fear response in her body makes her blood pressures go up.    Back Pain   This is a new problem. The current episode started 1 to 4 weeks ago. The problem occurs constantly. The problem has been gradually improving since onset. The pain is present in the lumbar spine. The quality of the pain is described as cramping. Pertinent negatives include no abdominal pain, bladder incontinence, bowel incontinence, chest pain, dysuria, fever, headaches or numbness.       Past Medical History:   Diagnosis Date    Allergy     Anxiety     Depression     GERD (gastroesophageal reflux disease)     Need for hepatitis C screening test 2/28/2018    Negative    Osteopenia     Reflux esophagitis     Seasonal allergies      Social History     Socioeconomic History    Marital status:      Spouse name: Mike Boston    Number of children: 3    Years of education: Not on file    Highest education level: Not on file   Occupational History    Occupation: Journal  Contributor/Writer   Social Needs    Financial resource strain: Not on file    Food insecurity:     Worry: Not on file     Inability: Not on file    Transportation needs:     Medical: Not on file     Non-medical: Not on file   Tobacco Use    Smoking status: Never Smoker    Smokeless tobacco: Never Used   Substance and Sexual Activity    Alcohol use: Yes     Comment: rarely    Drug use: No    Sexual activity: Yes     Partners: Male     Birth control/protection: None   Lifestyle    Physical activity:     Days per week: Not on file     Minutes per session: Not on file    Stress: Not on file   Relationships    Social connections:     Talks on phone: Not on file     Gets together: Not on file     Attends [employer] service: Not on file     Active member of club or organization: Not on file     Attends meetings of clubs or organizations: Not on file     Relationship status: Not on file   Other Topics Concern    Not on file   Social History Narrative    Not on file     Past Surgical History:   Procedure Laterality Date    ABDOMINAL ADHESION SURGERY      ANTERIOR VAGINAL REPAIR      BREAST BIOPSY      benign    CHOLECYSTECTOMY      HERNIA REPAIR      HYSTERECTOMY       Family History   Problem Relation Age of Onset    Breast cancer Maternal Aunt 70    Breast cancer Maternal Aunt 80    Hypertension Mother     Cirrhosis Father     Breast cancer Paternal Grandmother 35    Ovarian cancer Neg Hx        Review of Systems   Constitutional: Negative for activity change, chills, fatigue and fever.   Eyes: Negative for photophobia, redness, itching and visual disturbance.   Respiratory: Negative for cough, choking and shortness of breath.    Cardiovascular: Negative for chest pain, palpitations and leg swelling.        White coat hypertension   Gastrointestinal: Negative for abdominal distention, abdominal pain and bowel incontinence.   Genitourinary: Negative.  Negative for bladder incontinence, dysuria,  "flank pain and hematuria.   Musculoskeletal: Positive for back pain (This is better now.). Negative for joint swelling and myalgias.   Neurological: Negative for dizziness, seizures, numbness and headaches.         Objective:      Blood pressure 139/89, pulse 73, height 5' 6" (1.676 m), weight 63.5 kg (140 lb). Body mass index is 22.6 kg/m².  Physical Exam   Constitutional: She appears well-developed and well-nourished. No distress.   Eyes: Right eye exhibits no discharge. Left eye exhibits no discharge. No scleral icterus.   Neck: No tracheal deviation present. No thyromegaly present.   Cardiovascular: Normal rate and regular rhythm.   Pulmonary/Chest: Effort normal and breath sounds normal.   Abdominal: Soft. Bowel sounds are normal.   Musculoskeletal: Normal range of motion. She exhibits no edema or deformity.        Lumbar back: She exhibits normal range of motion, no tenderness, no bony tenderness, no swelling, no edema and no deformity.   Neurological: She is alert. She displays no atrophy, no tremor and normal reflexes. No sensory deficit. She exhibits normal muscle tone. Coordination normal.   Skin: No rash noted. No erythema. No pallor.   Psychiatric:   Slightly anxious.         Assessment:       1. Sprain of ligament of lumbosacral joint, subsequent encounter    2. Need for pneumococcal vaccination           Lab Visit on 11/27/2019   Component Date Value Ref Range Status    Cholesterol 11/27/2019 152  120 - 199 mg/dL Final    Triglycerides 11/27/2019 95  30 - 150 mg/dL Final    HDL 11/27/2019 54  40 - 75 mg/dL Final    LDL Cholesterol 11/27/2019 79.0  63.0 - 159.0 mg/dL Final    Hdl/Cholesterol Ratio 11/27/2019 35.5  20.0 - 50.0 % Final    Total Cholesterol/HDL Ratio 11/27/2019 2.8  2.0 - 5.0 Final    Non-HDL Cholesterol 11/27/2019 98  mg/dL Final    Total Protein 11/27/2019 7.6  6.0 - 8.4 g/dL Final    Albumin 11/27/2019 4.6  3.5 - 5.2 g/dL Final    Total Bilirubin 11/27/2019 0.8  0.1 - 1.0 " mg/dL Final    Bilirubin, Direct 11/27/2019 0.1  0.1 - 0.3 mg/dL Final    AST 11/27/2019 27  10 - 40 U/L Final    ALT 11/27/2019 37  10 - 44 U/L Final    Alkaline Phosphatase 11/27/2019 75  55 - 135 U/L Final         Plan:           Sprain of ligament of lumbosacral joint, subsequent encounter  Comments:  New     Need for pneumococcal vaccination      Patient's back is doing much better at this point.  She is following back hygiene measures.  I did ask her to lift a stool in the office and she did it the proper way by bending her knees and not bending her spine..    Blood pressures tend to be elevated especially when she sees me.  She has been subconsciously conditioned to respond in an anxious way whenever she thinks about me especially given the 1st time when her blood pressures was elevated several years back.    I advised her to relax and recheck her blood pressures at home and I am sure they will be better.    Today she will get the 1st of pneumonia series of vaccinations.    I will see her back in 6 months for preventive physical.    Back exercises have been given to her already.      Current Outpatient Medications:     atorvastatin (LIPITOR) 10 MG tablet, Take 10 mg by mouth once daily., Disp: , Rfl:     coQ10, ubiquinol, 200 mg Cap, CoQ10, Disp: , Rfl:     estradiol (ESTRACE) 0.01 % (0.1 mg/gram) vaginal cream, Place vaginally once daily., Disp: , Rfl:     fluticasone (FLONASE) 50 mcg/actuation nasal spray, 2 SPRAYS IN EACH NOSTRIL ONCE DAILY, Disp: 16 g, Rfl: 5    multivit-min/iron/folic/lutein (CENTRUM SILVER WOMEN ORAL), Centrum Silver, Disp: , Rfl:

## 2020-02-20 ENCOUNTER — OFFICE VISIT (OUTPATIENT)
Dept: FAMILY MEDICINE | Facility: CLINIC | Age: 75
End: 2020-02-20
Payer: MEDICARE

## 2020-02-20 VITALS
HEIGHT: 66 IN | SYSTOLIC BLOOD PRESSURE: 125 MMHG | BODY MASS INDEX: 22.98 KG/M2 | HEART RATE: 95 BPM | WEIGHT: 143 LBS | DIASTOLIC BLOOD PRESSURE: 71 MMHG

## 2020-02-20 DIAGNOSIS — R03.0 ELEVATED BLOOD PRESSURE READING: Primary | ICD-10-CM

## 2020-02-20 DIAGNOSIS — E78.2 MIXED DYSLIPIDEMIA: ICD-10-CM

## 2020-02-20 PROCEDURE — 1159F MED LIST DOCD IN RCRD: CPT | Mod: S$GLB,,, | Performed by: INTERNAL MEDICINE

## 2020-02-20 PROCEDURE — 1159F PR MEDICATION LIST DOCUMENTED IN MEDICAL RECORD: ICD-10-PCS | Mod: S$GLB,,, | Performed by: INTERNAL MEDICINE

## 2020-02-20 PROCEDURE — 1170F PR FUNCTIONAL STATUS ASSESSED: ICD-10-PCS | Mod: S$GLB,,, | Performed by: INTERNAL MEDICINE

## 2020-02-20 PROCEDURE — 1170F FXNL STATUS ASSESSED: CPT | Mod: S$GLB,,, | Performed by: INTERNAL MEDICINE

## 2020-02-20 PROCEDURE — 1126F PR PAIN SEVERITY QUANTIFIED, NO PAIN PRESENT: ICD-10-PCS | Mod: S$GLB,,, | Performed by: INTERNAL MEDICINE

## 2020-02-20 PROCEDURE — 99213 PR OFFICE/OUTPT VISIT, EST, LEVL III, 20-29 MIN: ICD-10-PCS | Mod: S$GLB,,, | Performed by: INTERNAL MEDICINE

## 2020-02-20 PROCEDURE — 1101F PR PT FALLS ASSESS DOC 0-1 FALLS W/OUT INJ PAST YR: ICD-10-PCS | Mod: S$GLB,,, | Performed by: INTERNAL MEDICINE

## 2020-02-20 PROCEDURE — 1101F PT FALLS ASSESS-DOCD LE1/YR: CPT | Mod: S$GLB,,, | Performed by: INTERNAL MEDICINE

## 2020-02-20 PROCEDURE — 99213 OFFICE O/P EST LOW 20 MIN: CPT | Mod: S$GLB,,, | Performed by: INTERNAL MEDICINE

## 2020-02-20 PROCEDURE — 1126F AMNT PAIN NOTED NONE PRSNT: CPT | Mod: S$GLB,,, | Performed by: INTERNAL MEDICINE

## 2020-02-20 NOTE — PROGRESS NOTES
"Subjective:       Patient ID: Farrah Boston is a 75 y.o. female.    Chief Complaint: Hypertension (White coat hypertension) and Hyperlipidemia    Miss Farrah Boston is a pleasant 75-year-old  female who comes for follow-up.  Underlying hyperlipidemia has been noted for which she takes generic Lipitor 10 mg.    Historically in past, her blood pressures have been mostly elevated in my office.  She states that this is white coat hypertension and at home most of her blood pressure readings are within good range.  Occasionally they have been above 140s and 150s.  Whenever she goes to Dr. Bowen's office,her blood pressures are typically pristine.  She has bought her home blood pressure readings and I do see few blood pressure readings greater than 150 and even 1 reading was greater than 160.    Her blood pressure readings have been checked in my office and the tend to be elevated.  She states that somehow in my office and in my presence her blood pressures tend to go high because her mind has been primed for a " test" with me.  She states- "I am psyching my self out".    Hyperlipidemia   This is a chronic problem. The current episode started more than 1 year ago. The problem is controlled. She has no history of obesity. Pertinent negatives include no chest pain, myalgias or shortness of breath. Current antihyperlipidemic treatment includes statins. The current treatment provides moderate improvement of lipids. There are no compliance problems.  Risk factors for coronary artery disease include dyslipidemia and post-menopausal.       Past Medical History:   Diagnosis Date    Allergy     Anxiety     Depression     GERD (gastroesophageal reflux disease)     Need for hepatitis C screening test 2/28/2018    Negative    Osteopenia     Reflux esophagitis     Seasonal allergies      Social History     Socioeconomic History    Marital status:      Spouse name: Mike Boston    Number of children: 3    " Years of education: Not on file    Highest education level: Not on file   Occupational History    Occupation: Journal Contributor/Writer   Social Needs    Financial resource strain: Not on file    Food insecurity:     Worry: Not on file     Inability: Not on file    Transportation needs:     Medical: Not on file     Non-medical: Not on file   Tobacco Use    Smoking status: Never Smoker    Smokeless tobacco: Never Used   Substance and Sexual Activity    Alcohol use: Yes     Comment: rarely    Drug use: No    Sexual activity: Yes     Partners: Male     Birth control/protection: None   Lifestyle    Physical activity:     Days per week: Not on file     Minutes per session: Not on file    Stress: Not on file   Relationships    Social connections:     Talks on phone: Not on file     Gets together: Not on file     Attends Restorationism service: Not on file     Active member of club or organization: Not on file     Attends meetings of clubs or organizations: Not on file     Relationship status: Not on file   Other Topics Concern    Not on file   Social History Narrative    Not on file     Past Surgical History:   Procedure Laterality Date    ABDOMINAL ADHESION SURGERY      ANTERIOR VAGINAL REPAIR      BREAST BIOPSY      benign    CHOLECYSTECTOMY      HERNIA REPAIR      HYSTERECTOMY       Family History   Problem Relation Age of Onset    Breast cancer Maternal Aunt 70    Breast cancer Maternal Aunt 80    Hypertension Mother     Cirrhosis Father     Breast cancer Paternal Grandmother 35    Ovarian cancer Neg Hx        Review of Systems   Constitutional: Negative for activity change, chills, fatigue and fever.   HENT: Negative for congestion, facial swelling and sore throat.    Eyes: Negative for photophobia, redness, itching and visual disturbance.   Respiratory: Negative for cough, choking and shortness of breath.    Cardiovascular: Negative for chest pain, palpitations and leg swelling.        White  "coat hypertension   Gastrointestinal: Negative for abdominal distention and abdominal pain.   Genitourinary: Negative.  Negative for dysuria, flank pain and hematuria.   Musculoskeletal: Positive for back pain (This is better now.). Negative for joint swelling and myalgias.   Neurological: Negative for dizziness, seizures, numbness and headaches.   Psychiatric/Behavioral: Negative for agitation, behavioral problems and confusion. The patient is nervous/anxious.         Slight degree of anxiety probably in my office.         Objective:      Blood pressure 125/71, pulse 95, height 5' 6" (1.676 m), weight 64.9 kg (143 lb). Body mass index is 23.08 kg/m².  Physical Exam   Constitutional: She appears well-developed and well-nourished. No distress.   Eyes: Right eye exhibits no discharge. Left eye exhibits no discharge. No scleral icterus.   Neck: No tracheal deviation present. No thyromegaly present.   Cardiovascular: Normal rate and regular rhythm.   Pulmonary/Chest: Effort normal and breath sounds normal.   Abdominal: Soft. Bowel sounds are normal.   Musculoskeletal: Normal range of motion. She exhibits no edema or deformity.        Lumbar back: She exhibits normal range of motion, no tenderness, no bony tenderness, no swelling, no edema and no deformity.   Neurological: She is alert. She displays no atrophy and no tremor.   Skin: No rash noted. No erythema. No pallor.   Psychiatric:   Slightly anxious.         Assessment:       1. Elevated blood pressure reading    2. Mixed dyslipidemia           Lab Visit on 11/27/2019   Component Date Value Ref Range Status    Cholesterol 11/27/2019 152  120 - 199 mg/dL Final    Triglycerides 11/27/2019 95  30 - 150 mg/dL Final    HDL 11/27/2019 54  40 - 75 mg/dL Final    LDL Cholesterol 11/27/2019 79.0  63.0 - 159.0 mg/dL Final    Hdl/Cholesterol Ratio 11/27/2019 35.5  20.0 - 50.0 % Final    Total Cholesterol/HDL Ratio 11/27/2019 2.8  2.0 - 5.0 Final    Non-HDL Cholesterol " 11/27/2019 98  mg/dL Final    Total Protein 11/27/2019 7.6  6.0 - 8.4 g/dL Final    Albumin 11/27/2019 4.6  3.5 - 5.2 g/dL Final    Total Bilirubin 11/27/2019 0.8  0.1 - 1.0 mg/dL Final    Bilirubin, Direct 11/27/2019 0.1  0.1 - 0.3 mg/dL Final    AST 11/27/2019 27  10 - 40 U/L Final    ALT 11/27/2019 37  10 - 44 U/L Final    Alkaline Phosphatase 11/27/2019 75  55 - 135 U/L Final     Echocardiogram done 05/14/2012 and read by Dr. Bowen.  Reason for performance was syncope.    Left ventricular size and function are normal.  Ejection fraction is visually estimated at 70%.  Mild to moderate mitral regurgitation with anteriorly directed jet.  Mild aortic regurgitation.  Mild elevated pulmonary artery pressure estimated to be 40 mm of mercury.  Mitral valve shows mildly thickened leaflets with mild prolapse noted in the anterior leaflets.    Plan:           Elevated blood pressure reading    Mixed dyslipidemia      Patient's blood pressure readings are typically elevated in office and generally okay at home.  We have not made a decision thus far if she really has blood pressure or not.  Thus far she does not have any eye changes.  I will see if Dr. Bowen had done any EKG or echocardiogram on her.    Patient tends to resist the thought that she has hypertension and is leery of taking any other additional medications unless and until absolutely necessary.  I have suspicion will have to leave it at that.    She is taking her Lipitor without any problems.    (echocardiogram done in 2012 at least did not show any evidence of left ventricular hypertrophy.  It had shown some mild to moderate mitral regurgitation and slightly elevated pulmonary artery pressure.)  (Holter monitor also done at that point did not show any significant arrhythmias.).    Follow-up in 6 months or earlier as needed.  Patient has been advised to take her or blood pressure medication Dr. Roca  office for re- confirmation of normal blood  pressures.      Current Outpatient Medications:     atorvastatin (LIPITOR) 10 MG tablet, Take 10 mg by mouth once daily., Disp: , Rfl:     coQ10, ubiquinol, 200 mg Cap, CoQ10, Disp: , Rfl:     estradiol (ESTRACE) 0.01 % (0.1 mg/gram) vaginal cream, Place vaginally once daily., Disp: , Rfl:     multivit-min/iron/folic/lutein (CENTRUM SILVER WOMEN ORAL), Centrum Silver, Disp: , Rfl:

## 2020-02-20 NOTE — PATIENT INSTRUCTIONS
Prevention Guidelines, Women Ages 65 and Older  Screening tests and vaccines are an important part of managing your health. Health counseling is essential, too. Below are guidelines for these, for women ages 65 and older. Talk with your healthcare provider to make sure youre up to date on what you need.  Screening Who needs it How often   Type 2 diabetes or prediabetes All adults beginning at age 45 and adults without symptoms at any age who are overweight or obese and have 1 or more additional risk factors for diabetes At least every 3 years   Alcohol misuse All women in this age group At routine exams   Blood pressure All women in this age group Every 2 years if your blood pressure is less than 120/80 mm Hg; yearly if your systolic blood pressure is 120 to 139 mm Hg, or your diastolic blood pressure reading is 80 to 89 mm Hg   Breast cancer All women in this age group Yearly mammogram and clinical breast exam1   Cervical cancer Only women who had abnormal screening results before age 65 Talk with your healthcare provider   Chlamydia Women at increased risk for infection At routine exams   Colorectal cancer All women in this age group1 Flexible sigmoidoscopy every 5 years, or colonoscopy every 10 years, or double-contrast barium enema every 5 years; yearly fecal occult blood test or fecal immunochemical test; or a stool DNA test as often as your healthcare provider advises; talk with your healthcare provider about which tests are best for you   Depression All women in this age group At routine exams   Gonorrhea Sexually active women at increased risk for infection At routine exams   Hepatitis C Anyone at increased risk; 1 time for those born between 1945 and 1965 At routine exams   High cholesterol or triglycerides All women in this age group who are at risk for coronary artery disease At least every 5 years   HIV Women at increased risk for infection - talk with your healthcare provider At routine exams   Lung  cancer Adults age 55 to 80 who have smoked Yearly screening in smokers with 30 pack-year history of smoking or who quit within 15 years   Obesity All women in this age group At routine exams   Osteoporosis All women in this age group Bone density test at age 65, then follow-up as advised by your healthcare provider   Syphilis Women at increased risk for infection - talk with your healthcare provider At routine exams   Thyroid-Stimulating Hormone (TSH) All women in this age group Every 5 years   Tuberculosis Women at increased risk for infection - talk with your healthcare provider Ask your healthcare provider   Vision All women in this age group Every 1 to 2 years; if you have a chronic health condition, ask your healthcare provider if you need exams more often   Vaccine Who needs it How often   Chickenpox (varicella) All women in this age group who have no record of this infection or vaccine 2 doses; second dose should be given at least 4 weeks after the first dose   Hepatitis A Women at increased risk for infection - talk with your healthcare provider 2 doses given 6 months apart   Hepatitis B Women at increased risk for infection - talk with your healthcare provider 3 doses over 6 months; second dose should be given 1 month after the first dose; the third dose should be given at least 2 months after the second dose and at least 4 months after the first dose   Haemophilus influenza Type B (HIB) Women at increased risk for infection - talk with your healthcare provider 1 to 3 doses   Influenza (flu) All women in this age group Once a year   Pneumococcal conjugate vaccine (PCV13) and pneumococcal polysaccharide vaccine (PPSV23) All women in this age group 1 dose of each vaccine   Tetanus/diphtheria/pertussis (Td/Tdap) booster All women in this age group Td every 10 years, or a one-time dose of Tdap instead of a Td booster after age 18, then Td every 10 years   Zoster All women in this age group 1 dose   Counseling  Who needs it How often   Diet and exercise Women who are overweight or obese When diagnosed, and then at routine exams   Fall prevention (exercise and vitamin D supplements) All women in this age group At routine exams   Sexually transmitted infection prevention Women at increased risk for infection - talk with your healthcare provider At routine exams   Use of daily aspirin Women ages 55 and up in this age group who are at risk for cardiovascular health problems such as stroke When your risk is known   Use of tobacco and the health effects it can cause All women in this age group Every exam   1[employer] Society  Date Last Reviewed: 8/9/2015  © 3505-3683 [employer]. [address] [place] [address], [place] [address]. All rights reserved. This information is not intended as a substitute for professional medical care. Always follow your healthcare professional's instructions.

## 2020-06-18 ENCOUNTER — TELEPHONE (OUTPATIENT)
Dept: FAMILY MEDICINE | Facility: CLINIC | Age: 75
End: 2020-06-18

## 2020-06-18 ENCOUNTER — OFFICE VISIT (OUTPATIENT)
Dept: FAMILY MEDICINE | Facility: CLINIC | Age: 75
End: 2020-06-18
Payer: MEDICARE

## 2020-06-18 VITALS
HEART RATE: 86 BPM | HEIGHT: 66 IN | DIASTOLIC BLOOD PRESSURE: 77 MMHG | SYSTOLIC BLOOD PRESSURE: 146 MMHG | WEIGHT: 144 LBS | BODY MASS INDEX: 23.14 KG/M2

## 2020-06-18 DIAGNOSIS — M79.604 PAIN IN BOTH LOWER EXTREMITIES: ICD-10-CM

## 2020-06-18 DIAGNOSIS — E55.9 VITAMIN D DEFICIENCY: ICD-10-CM

## 2020-06-18 DIAGNOSIS — R52 COMPLAINTS OF TOTAL BODY PAIN: Primary | ICD-10-CM

## 2020-06-18 DIAGNOSIS — Z78.0 ASYMPTOMATIC MENOPAUSE: ICD-10-CM

## 2020-06-18 DIAGNOSIS — E53.8 VITAMIN B12 DEFICIENCY: ICD-10-CM

## 2020-06-18 DIAGNOSIS — M79.605 PAIN IN BOTH LOWER EXTREMITIES: ICD-10-CM

## 2020-06-18 PROCEDURE — 1159F MED LIST DOCD IN RCRD: CPT | Mod: S$GLB,,, | Performed by: INTERNAL MEDICINE

## 2020-06-18 PROCEDURE — 3008F BODY MASS INDEX DOCD: CPT | Mod: S$GLB,,, | Performed by: INTERNAL MEDICINE

## 2020-06-18 PROCEDURE — 3008F PR BODY MASS INDEX (BMI) DOCUMENTED: ICD-10-PCS | Mod: S$GLB,,, | Performed by: INTERNAL MEDICINE

## 2020-06-18 PROCEDURE — 1125F AMNT PAIN NOTED PAIN PRSNT: CPT | Mod: S$GLB,,, | Performed by: INTERNAL MEDICINE

## 2020-06-18 PROCEDURE — 99213 OFFICE O/P EST LOW 20 MIN: CPT | Mod: S$GLB,,, | Performed by: INTERNAL MEDICINE

## 2020-06-18 PROCEDURE — 1159F PR MEDICATION LIST DOCUMENTED IN MEDICAL RECORD: ICD-10-PCS | Mod: S$GLB,,, | Performed by: INTERNAL MEDICINE

## 2020-06-18 PROCEDURE — 1101F PT FALLS ASSESS-DOCD LE1/YR: CPT | Mod: S$GLB,,, | Performed by: INTERNAL MEDICINE

## 2020-06-18 PROCEDURE — 1101F PR PT FALLS ASSESS DOC 0-1 FALLS W/OUT INJ PAST YR: ICD-10-PCS | Mod: S$GLB,,, | Performed by: INTERNAL MEDICINE

## 2020-06-18 PROCEDURE — 99213 PR OFFICE/OUTPT VISIT, EST, LEVL III, 20-29 MIN: ICD-10-PCS | Mod: S$GLB,,, | Performed by: INTERNAL MEDICINE

## 2020-06-18 PROCEDURE — 1125F PR PAIN SEVERITY QUANTIFIED, PAIN PRESENT: ICD-10-PCS | Mod: S$GLB,,, | Performed by: INTERNAL MEDICINE

## 2020-06-18 RX ORDER — METOPROLOL SUCCINATE 25 MG/1
1 TABLET, EXTENDED RELEASE ORAL DAILY
COMMUNITY
Start: 2020-05-23 | End: 2020-09-08 | Stop reason: SDUPTHER

## 2020-06-18 RX ORDER — FERROUS SULFATE, DRIED 160(50) MG
1 TABLET, EXTENDED RELEASE ORAL 2 TIMES DAILY WITH MEALS
COMMUNITY

## 2020-06-18 NOTE — PATIENT INSTRUCTIONS
Communicating About Pain    You have a right to have your pain evaluated and treated as effectively as possible. Untreated pain can limit eating, sleeping, and activity. Tell your healthcare provider where and how much you hurt. It may not be possible to relieve all the pain. But your healthcare provider can help you reach a pain level you can live with.  Your role  Tell your healthcare provider about the pain and your health problems. Be sure to:  · Mention all the medicines you take. This includes any you buy over-the-counter and any herbs, teas, or vitamins you take.  · Mention any pain relief techniques you use, like massage or meditation.  · Measure pain as directed by your healthcare provider.  · If your healthcare provider asks you to, keep a diary of your pain, the treatments you are using and how well they work. You may also be asked to describe how strong your pain is on a scale of zero to 10. Zero is no pain and 10 is the worst pain you can imagine. Be prepared to do this for your healthcare provider.  · Follow your treatment plan as directed by your healthcare provider. Tell your healthcare provider how your treatment is working.  As pain is reduced, youll feel better. Less pain means less stress on your body and mind.  Your healthcare providers role  Your healthcare provider will help you understand and manage pain. You will be told about your pain control options. These will most likely include medicines. Options like physical therapy and acupuncture may also help.  Note for family and friends  It may be hard to understand how your loved one feels. But the pain he or she has is real. You may not be able to stop the pain. You can help in other ways, though. Spend time with your loved one. This helps distract from the pain. And help him or her take medicines on time and in the correct amount.   Date Last Reviewed: 5/1/2017  © 9813-2265 [employer]. [address] [place] [address], [place]  [address]. All rights reserved. This information is not intended as a substitute for professional medical care. Always follow your healthcare professional's instructions.

## 2020-06-18 NOTE — PROGRESS NOTES
Subjective:       Patient ID: Farrah Boston is a 75 y.o. female.    Chief Complaint: Leg Pain (knee, leg, groin, pain moves )    Patient is having strange symptoms.  Sometimes she hurts in the legs, then the legs get better and she starts hurting in jaw.  The jaw gets better and then she starts hurting in the ear., there is no reason or I am for fluctuating pains.  No swelling noted.  No fevers.  No weight loss in fact she gained 1 lb of weight over the COVID-19 situation.  Appetite is good.  She is as happy and happy can be and sometimes she gets too excited.  Her blood pressure goes up when she gets excited.          Past Medical History:   Diagnosis Date    Allergy     Anxiety     Depression     GERD (gastroesophageal reflux disease)     Need for hepatitis C screening test 2/28/2018    Negative    Osteopenia     Reflux esophagitis     Seasonal allergies      Social History     Socioeconomic History    Marital status:      Spouse name: Mike Boston    Number of children: 3    Years of education: Not on file    Highest education level: Not on file   Occupational History    Occupation: Journal Contributor/Writer     Comment: [employer]   Social Needs    Financial resource strain: Not on file    Food insecurity     Worry: Not on file     Inability: Not on file    Transportation needs     Medical: Not on file     Non-medical: Not on file   Tobacco Use    Smoking status: Never Smoker    Smokeless tobacco: Never Used   Substance and Sexual Activity    Alcohol use: Yes     Comment: rarely    Drug use: No    Sexual activity: Yes     Partners: Male     Birth control/protection: None   Lifestyle    Physical activity     Days per week: Not on file     Minutes per session: Not on file    Stress: Not on file   Relationships    Social connections     Talks on phone: Not on file     Gets together: Not on file     Attends Voodoo service: Not on file     Active member of club or  organization: Not on file     Attends meetings of clubs or organizations: Not on file     Relationship status: Not on file   Other Topics Concern    Not on file   Social History Narrative    Not on file     Past Surgical History:   Procedure Laterality Date    ABDOMINAL ADHESION SURGERY      ANTERIOR VAGINAL REPAIR      BREAST BIOPSY      benign    CHOLECYSTECTOMY      HERNIA REPAIR      HYSTERECTOMY       Family History   Problem Relation Age of Onset    Breast cancer Maternal Aunt 70    Breast cancer Maternal Aunt 80    Hypertension Mother     Cirrhosis Father     Breast cancer Paternal Grandmother 35    Ovarian cancer Neg Hx        Review of Systems   Constitutional: Negative for activity change, appetite change, chills, diaphoresis, fatigue, fever and unexpected weight change (gained 1 lb COVID 19 days).   HENT: Negative for congestion, facial swelling and sore throat.         Left-sided neck pain which suddenly came and went away.  She felt she had a bad sore throat.   Eyes: Negative for photophobia, redness, itching and visual disturbance.   Respiratory: Negative for cough, choking, chest tightness and shortness of breath.    Cardiovascular: Negative for chest pain, palpitations and leg swelling.        White coat hypertension   Gastrointestinal: Negative for abdominal distention, abdominal pain, anal bleeding, diarrhea, nausea and rectal pain.        Few days back she had pelvic pain.   Genitourinary: Positive for pelvic pain. Negative for difficulty urinating, dysuria, flank pain and hematuria.   Musculoskeletal: Positive for back pain (This is better now.). Negative for joint swelling and myalgias.        Bilateral knee pains and pelvic pains.  Suddenly came and suddenly disappeared.   Neurological: Negative for dizziness, seizures and headaches.   Psychiatric/Behavioral: Negative for agitation, behavioral problems and confusion. The patient is nervous/anxious.         Slight degree of anxiety  "probably in my office.         Objective:      Blood pressure (!) 146/77, pulse 86, height 5' 6" (1.676 m), weight 65.3 kg (144 lb). Body mass index is 23.24 kg/m².  Physical Exam  Constitutional:       General: She is not in acute distress.     Appearance: She is well-developed.   HENT:      Head:      Comments: Face mask  Eyes:      General: No scleral icterus.        Right eye: No discharge.         Left eye: No discharge.   Neck:      Thyroid: No thyromegaly.      Trachea: No tracheal deviation.   Cardiovascular:      Rate and Rhythm: Normal rate and regular rhythm.   Pulmonary:      Effort: Pulmonary effort is normal.      Breath sounds: Normal breath sounds.   Abdominal:      General: Bowel sounds are normal.      Palpations: Abdomen is soft.   Musculoskeletal: Normal range of motion.         General: No deformity.      Lumbar back: She exhibits normal range of motion, no tenderness, no bony tenderness, no swelling, no edema and no deformity.   Skin:     Coloration: Skin is not pale.      Findings: No erythema or rash.             Comments: On examining the spots that patient had experienced pain, I do not find any evidence of lesions, swelling or pain on pressure.   Neurological:      Mental Status: She is alert.      Motor: No tremor or atrophy.   Psychiatric:      Comments: Slightly anxious.           Assessment:       1. Complaints of total body pain    2. Pain in both lower extremities    3. Vitamin D deficiency    4. Vitamin B12 deficiency    5. Asymptomatic menopause           No visits with results within 3 Month(s) from this visit.   Latest known visit with results is:   Lab Visit on 11/27/2019   Component Date Value Ref Range Status    Cholesterol 11/27/2019 152  120 - 199 mg/dL Final    Triglycerides 11/27/2019 95  30 - 150 mg/dL Final    HDL 11/27/2019 54  40 - 75 mg/dL Final    LDL Cholesterol 11/27/2019 79.0  63.0 - 159.0 mg/dL Final    Hdl/Cholesterol Ratio 11/27/2019 35.5  20.0 - 50.0 % " Final    Total Cholesterol/HDL Ratio 11/27/2019 2.8  2.0 - 5.0 Final    Non-HDL Cholesterol 11/27/2019 98  mg/dL Final    Total Protein 11/27/2019 7.6  6.0 - 8.4 g/dL Final    Albumin 11/27/2019 4.6  3.5 - 5.2 g/dL Final    Total Bilirubin 11/27/2019 0.8  0.1 - 1.0 mg/dL Final    Bilirubin, Direct 11/27/2019 0.1  0.1 - 0.3 mg/dL Final    AST 11/27/2019 27  10 - 40 U/L Final    ALT 11/27/2019 37  10 - 44 U/L Final    Alkaline Phosphatase 11/27/2019 75  55 - 135 U/L Final         Plan:           Complaints of total body pain  Comments:  Multiple areas of pain including legs, knee, pelvic region, left side of the neck.  Orders:  -     Sedimentation rate; Future; Expected date: 06/18/2020  -     Comprehensive metabolic panel; Future; Expected date: 06/18/2020    Pain in both lower extremities  -     CBC auto differential; Future; Expected date: 06/18/2020    Vitamin D deficiency  -     Vitamin D; Future; Expected date: 06/18/2020    Vitamin B12 deficiency  -     Vitamin B12; Future; Expected date: 06/18/2020    Asymptomatic menopause  -     DXA Bone Density Appendicular Skeleton; Future; Expected date: 06/18/2020      Patient's reason for multiple spot of pains in her body is not easily understandable at this point.  Per examination she does not have any evidence of arthritis or rash.  Pressure over the spots that she experience pain does not elicit any tenderness.    Follow-up in 6 months time.      Current Outpatient Medications:     atorvastatin (LIPITOR) 10 MG tablet, Take 10 mg by mouth once daily., Disp: , Rfl:     calcium-vitamin D3 (OS-LUIS 500 + D3) 500 mg(1,250mg) -200 unit per tablet, Take 1 tablet by mouth 2 (two) times daily with meals., Disp: , Rfl:     coQ10, ubiquinol, 200 mg Cap, CoQ10, Disp: , Rfl:     estradiol (ESTRACE) 0.01 % (0.1 mg/gram) vaginal cream, Place vaginally once daily., Disp: , Rfl:     metoprolol succinate (TOPROL-XL) 25 MG 24 hr tablet, 1 tablet once daily., Disp: , Rfl:      multivit-min/iron/folic/lutein (CENTRUM SILVER WOMEN ORAL), Centrum Silver, Disp: , Rfl:

## 2020-06-19 ENCOUNTER — HOSPITAL ENCOUNTER (OUTPATIENT)
Dept: RADIOLOGY | Facility: HOSPITAL | Age: 75
Discharge: HOME | End: 2020-06-19
Attending: INTERNAL MEDICINE
Payer: MEDICARE

## 2020-06-19 DIAGNOSIS — Z78.0 ASYMPTOMATIC MENOPAUSE: ICD-10-CM

## 2020-06-19 PROCEDURE — 77080 DXA BONE DENSITY AXIAL: CPT | Mod: TC,PO

## 2020-06-22 ENCOUNTER — HOSPITAL ENCOUNTER (OUTPATIENT)
Dept: RADIOLOGY | Facility: HOSPITAL | Age: 75
Discharge: HOME | End: 2020-06-22
Attending: OBSTETRICS & GYNECOLOGY
Payer: MEDICARE

## 2020-06-22 ENCOUNTER — OFFICE VISIT (OUTPATIENT)
Dept: OBSTETRICS AND GYNECOLOGY | Facility: CLINIC | Age: 75
End: 2020-06-22
Payer: MEDICARE

## 2020-06-22 VITALS
DIASTOLIC BLOOD PRESSURE: 78 MMHG | WEIGHT: 146.38 LBS | BODY MASS INDEX: 23.63 KG/M2 | SYSTOLIC BLOOD PRESSURE: 134 MMHG

## 2020-06-22 DIAGNOSIS — Z12.31 ENCOUNTER FOR SCREENING MAMMOGRAM FOR BREAST CANCER: ICD-10-CM

## 2020-06-22 DIAGNOSIS — Z12.39 SCREENING BREAST EXAMINATION: Primary | ICD-10-CM

## 2020-06-22 DIAGNOSIS — N95.2 VAGINAL ATROPHY: ICD-10-CM

## 2020-06-22 DIAGNOSIS — Z12.31 ENCOUNTER FOR SCREENING MAMMOGRAM FOR BREAST CANCER: Primary | ICD-10-CM

## 2020-06-22 PROCEDURE — G0101 CA SCREEN;PELVIC/BREAST EXAM: HCPCS | Mod: S$GLB,,, | Performed by: OBSTETRICS & GYNECOLOGY

## 2020-06-22 PROCEDURE — 99999 PR PBB SHADOW E&M-EST. PATIENT-LVL III: ICD-10-PCS | Mod: PBBFAC,,, | Performed by: OBSTETRICS & GYNECOLOGY

## 2020-06-22 PROCEDURE — 77063 BREAST TOMOSYNTHESIS BI: CPT | Mod: 26,,, | Performed by: RADIOLOGY

## 2020-06-22 PROCEDURE — 77067 MAMMO DIGITAL SCREENING BILAT WITH TOMOSYNTHESIS_CAD: ICD-10-PCS | Mod: 26,,, | Performed by: RADIOLOGY

## 2020-06-22 PROCEDURE — 99999 PR PBB SHADOW E&M-EST. PATIENT-LVL III: CPT | Mod: PBBFAC,,, | Performed by: OBSTETRICS & GYNECOLOGY

## 2020-06-22 PROCEDURE — 77067 SCR MAMMO BI INCL CAD: CPT | Mod: TC,PN

## 2020-06-22 PROCEDURE — 77063 MAMMO DIGITAL SCREENING BILAT WITH TOMOSYNTHESIS_CAD: ICD-10-PCS | Mod: 26,,, | Performed by: RADIOLOGY

## 2020-06-22 PROCEDURE — 77067 SCR MAMMO BI INCL CAD: CPT | Mod: 26,,, | Performed by: RADIOLOGY

## 2020-06-22 PROCEDURE — G0101 PR CA SCREEN;PELVIC/BREAST EXAM: ICD-10-PCS | Mod: S$GLB,,, | Performed by: OBSTETRICS & GYNECOLOGY

## 2020-06-22 NOTE — PROGRESS NOTES
"Chief Complaint   Patient presents with    Well Woman       History and Physical:  No LMP recorded (lmp unknown). Patient has had a hysterectomy.       Farrah Boston is a 75 y.o.  female who presents today for her routine annual GYN exam. The patient has no Gynecology complaints today. Pain on and off jaw, hips, knees, pelvis - now resolved, seen primary care - watching for now.      Allergies:   Review of patient's allergies indicates:   Allergen Reactions    Morphine Shortness Of Breath     Patient states her" breathing stops"    Codeine Nausea And Vomiting       Past Medical History:   Diagnosis Date    Allergy     Anxiety     Depression     GERD (gastroesophageal reflux disease)     Need for hepatitis C screening test 2018    Negative    Osteopenia     Reflux esophagitis     Seasonal allergies        Past Surgical History:   Procedure Laterality Date    ABDOMINAL ADHESION SURGERY      ANTERIOR VAGINAL REPAIR      BREAST BIOPSY      benign    CHOLECYSTECTOMY      HERNIA REPAIR      HYSTERECTOMY         MEDS:   Current Outpatient Medications on File Prior to Visit   Medication Sig Dispense Refill    atorvastatin (LIPITOR) 10 MG tablet Take 10 mg by mouth once daily.      calcium-vitamin D3 (OS-LUIS 500 + D3) 500 mg(1,250mg) -200 unit per tablet Take 1 tablet by mouth 2 (two) times daily with meals.      coQ10, ubiquinol, 200 mg Cap CoQ10      estradiol (ESTRACE) 0.01 % (0.1 mg/gram) vaginal cream Place vaginally once daily.      metoprolol succinate (TOPROL-XL) 25 MG 24 hr tablet 1 tablet once daily.      multivit-min/iron/folic/lutein (CENTRUM SILVER WOMEN ORAL) Centrum Silver       No current facility-administered medications on file prior to visit.        OB History        7    Para   6    Term   3            AB   1    Living           SAB   1    TAB        Ectopic        Multiple        Live Births                     Social History     Socioeconomic History "    Marital status:      Spouse name: Mike Boston    Number of children: 3    Years of education: Not on file    Highest education level: Not on file   Occupational History    Occupation: Journal Contributor/Writer     Comment: [employer]   Social Needs    Financial resource strain: Not on file    Food insecurity     Worry: Not on file     Inability: Not on file    Transportation needs     Medical: Not on file     Non-medical: Not on file   Tobacco Use    Smoking status: Never Smoker    Smokeless tobacco: Never Used   Substance and Sexual Activity    Alcohol use: Yes     Comment: rarely    Drug use: No    Sexual activity: Yes     Partners: Male     Birth control/protection: None   Lifestyle    Physical activity     Days per week: Not on file     Minutes per session: Not on file    Stress: Not on file   Relationships    Social connections     Talks on phone: Not on file     Gets together: Not on file     Attends Faith service: Not on file     Active member of club or organization: Not on file     Attends meetings of clubs or organizations: Not on file     Relationship status: Not on file   Other Topics Concern    Not on file   Social History Narrative    Not on file       Family History   Problem Relation Age of Onset    Breast cancer Maternal Aunt 70    Breast cancer Maternal Aunt 80    Hypertension Mother     Cirrhosis Father     Breast cancer Paternal Grandmother 35    Ovarian cancer Neg Hx          Past medical and surgical history reviewed.   I have reviewed the patient's medical history in detail and updated the computerized patient record.        Review of System:   General: no chills, fever, night sweats, weight gain or weight loss  Psychological: no depression or suicidal ideation  Breasts: no new or changing breast lumps, nipple discharge or masses.  Respiratory: no cough, shortness of breath, or wheezing  Cardiovascular: no chest pain or dyspnea on  exertion  Gastrointestinal: no abdominal pain, change in bowel habits, or black or bloody stools  Genito-Urinary: no incontinence, urinary frequency/urgency or vulvar/vaginal symptoms, pelvic pain or abnormal vaginal bleeding.  Musculoskeletal: no gait disturbance or muscular weakness      Physical Exam:   /78   Wt 66.4 kg (146 lb 6.2 oz)   LMP  (LMP Unknown)   BMI 23.63 kg/m²   Constitutional: She is oriented to person, place, and time. She appears well-developed and well-nourished. No distress.  HENT:   Head: Normocephalic and atraumatic.   Eyes: Conjunctivae and EOM are normal. No scleral icterus.   Neck: Normal range of motion. Neck supple. No tracheal deviation present.   Cardiovascular: Normal rate.    Pulmonary/Chest: Effort normal. No respiratory distress. She exhibits no tenderness.  Breasts: are symmetrical.   Right breast exhibits no inverted nipple, no mass, no nipple discharge, no skin change and no tenderness.   Left breast exhibits no inverted nipple, no mass, no nipple discharge, no skin change and no tenderness.  Abdominal: Soft. She exhibits no distension and no mass. There is no tenderness. There is no rebound and no guarding.   Genitourinary:    External rectal exam shows no thrombosed external hemorrhoids.    Pelvic exam was performed with patient supine.   No labial fusion.   There is no rash, lesion or injury on the right labia.   There is no rash, lesion or injury on the left labia.   No bleeding and no signs of injury around the vaginal introitus, urethra is without lesions and well supported.    No vaginal discharge found. Pale and atrophic   No significant Cystocele, Enterocele or rectocele, and cuff well supported.   Bimanual exam:   The urethra and vagina are without palpable masses or tenderness.   Uterus and cervix are surgically absents, vaginal cuff is intact and well supported.   Right adnexum displays no mass and no tenderness.   Left adnexum displays no mass and no  tenderness.  Musculoskeletal: Normal range of motion.   Lymphadenopathy: No inguinal adenopathy present.   Neurological: She is alert and oriented to person, place, and time. Coordination normal.   Skin: Skin is warm and dry. She is not diaphoretic.   Psychiatric: She has a normal mood and affect.        Assessment:   Normal annual GYN exam  polyartritis pains - x 2 weeks, none over last 3-4 days.- watchful waiting    Plan:   PAP Not Needed  Mammogram, urine culture today  Follow up in 1 year.

## 2020-07-11 ENCOUNTER — PATIENT MESSAGE (OUTPATIENT)
Dept: OBSTETRICS AND GYNECOLOGY | Facility: CLINIC | Age: 75
End: 2020-07-11

## 2020-07-13 ENCOUNTER — PATIENT MESSAGE (OUTPATIENT)
Dept: OBSTETRICS AND GYNECOLOGY | Facility: CLINIC | Age: 75
End: 2020-07-13

## 2020-07-13 ENCOUNTER — CLINICAL SUPPORT (OUTPATIENT)
Dept: OBSTETRICS AND GYNECOLOGY | Facility: CLINIC | Age: 75
End: 2020-07-13
Payer: MEDICARE

## 2020-07-13 DIAGNOSIS — R39.9 UTI SYMPTOMS: Primary | ICD-10-CM

## 2020-07-13 PROCEDURE — 87086 URINE CULTURE/COLONY COUNT: CPT

## 2020-07-13 NOTE — TELEPHONE ENCOUNTER
Patient is asking about recent urine culture and noted. Do you want patient to repeat urine culture?  Patient stated they are still having pelvic pain  Thank you?

## 2020-07-15 LAB — BACTERIA UR CULT: NO GROWTH

## 2020-09-08 RX ORDER — METOPROLOL SUCCINATE 25 MG/1
25 TABLET, EXTENDED RELEASE ORAL DAILY
Qty: 90 TABLET | Refills: 3 | Status: SHIPPED | OUTPATIENT
Start: 2020-09-08 | End: 2021-09-01

## 2020-09-23 ENCOUNTER — TELEPHONE (OUTPATIENT)
Dept: FAMILY MEDICINE | Facility: CLINIC | Age: 75
End: 2020-09-23

## 2020-09-23 ENCOUNTER — LAB VISIT (OUTPATIENT)
Dept: PRIMARY CARE CLINIC | Facility: CLINIC | Age: 75
End: 2020-09-23
Payer: MEDICARE

## 2020-09-23 DIAGNOSIS — R19.7 DIARRHEA: ICD-10-CM

## 2020-09-23 DIAGNOSIS — M79.10 MYALGIA: ICD-10-CM

## 2020-09-23 DIAGNOSIS — R51.9 HEAD ACHE: ICD-10-CM

## 2020-09-23 DIAGNOSIS — R53.83 FATIGUE: ICD-10-CM

## 2020-09-23 DIAGNOSIS — R11.0 NAUSEA: ICD-10-CM

## 2020-09-23 DIAGNOSIS — R51.9 ACUTE NONINTRACTABLE HEADACHE, UNSPECIFIED HEADACHE TYPE: ICD-10-CM

## 2020-09-23 DIAGNOSIS — J06.9 UPPER RESPIRATORY TRACT INFECTION, UNSPECIFIED TYPE: Primary | ICD-10-CM

## 2020-09-23 DIAGNOSIS — J06.9 UPPER RESPIRATORY TRACT INFECTION, UNSPECIFIED TYPE: ICD-10-CM

## 2020-09-23 PROCEDURE — U0003 INFECTIOUS AGENT DETECTION BY NUCLEIC ACID (DNA OR RNA); SEVERE ACUTE RESPIRATORY SYNDROME CORONAVIRUS 2 (SARS-COV-2) (CORONAVIRUS DISEASE [COVID-19]), AMPLIFIED PROBE TECHNIQUE, MAKING USE OF HIGH THROUGHPUT TECHNOLOGIES AS DESCRIBED BY CMS-2020-01-R: HCPCS

## 2020-09-23 NOTE — TELEPHONE ENCOUNTER
Order sent for COVID-19 screening as per patient request with complains of headache, nausea and myalgias.

## 2020-09-24 LAB — SARS-COV-2 RNA RESP QL NAA+PROBE: NOT DETECTED

## 2020-10-28 ENCOUNTER — TELEPHONE (OUTPATIENT)
Dept: CARDIOLOGY | Facility: CLINIC | Age: 75
End: 2020-10-28

## 2020-11-02 ENCOUNTER — PATIENT MESSAGE (OUTPATIENT)
Dept: CARDIOLOGY | Facility: CLINIC | Age: 75
End: 2020-11-02

## 2020-12-07 ENCOUNTER — PATIENT MESSAGE (OUTPATIENT)
Dept: FAMILY MEDICINE | Facility: CLINIC | Age: 75
End: 2020-12-07

## 2020-12-07 DIAGNOSIS — L03.019 FELON OF FINGER: Primary | ICD-10-CM

## 2020-12-07 RX ORDER — CEPHALEXIN 500 MG/1
500 CAPSULE ORAL EVERY 8 HOURS
Qty: 21 CAPSULE | Refills: 0 | Status: SHIPPED | OUTPATIENT
Start: 2020-12-07 | End: 2020-12-16

## 2020-12-09 ENCOUNTER — TELEPHONE (OUTPATIENT)
Dept: CARDIOLOGY | Facility: CLINIC | Age: 75
End: 2020-12-09

## 2020-12-09 NOTE — TELEPHONE ENCOUNTER
----- Message from Catherine Archer sent at 12/9/2020 11:59 AM CST -----  Regarding: appt  #please call [phone] patient to reschedule her appt

## 2020-12-16 ENCOUNTER — OFFICE VISIT (OUTPATIENT)
Dept: CARDIOLOGY | Facility: CLINIC | Age: 75
End: 2020-12-16
Payer: MEDICARE

## 2020-12-16 VITALS
RESPIRATION RATE: 16 BRPM | WEIGHT: 144 LBS | BODY MASS INDEX: 23.14 KG/M2 | OXYGEN SATURATION: 99 % | DIASTOLIC BLOOD PRESSURE: 74 MMHG | HEART RATE: 80 BPM | HEIGHT: 66 IN | SYSTOLIC BLOOD PRESSURE: 124 MMHG

## 2020-12-16 DIAGNOSIS — K21.9 GASTROESOPHAGEAL REFLUX DISEASE WITHOUT ESOPHAGITIS: ICD-10-CM

## 2020-12-16 DIAGNOSIS — I10 ESSENTIAL HYPERTENSION: Primary | ICD-10-CM

## 2020-12-16 DIAGNOSIS — E78.5 DYSLIPIDEMIA: ICD-10-CM

## 2020-12-16 PROCEDURE — 3008F PR BODY MASS INDEX (BMI) DOCUMENTED: ICD-10-PCS | Mod: CPTII,S$GLB,, | Performed by: INTERNAL MEDICINE

## 2020-12-16 PROCEDURE — 1159F PR MEDICATION LIST DOCUMENTED IN MEDICAL RECORD: ICD-10-PCS | Mod: S$GLB,,, | Performed by: INTERNAL MEDICINE

## 2020-12-16 PROCEDURE — 99214 PR OFFICE/OUTPT VISIT, EST, LEVL IV, 30-39 MIN: ICD-10-PCS | Mod: S$GLB,,, | Performed by: INTERNAL MEDICINE

## 2020-12-16 PROCEDURE — 3008F BODY MASS INDEX DOCD: CPT | Mod: CPTII,S$GLB,, | Performed by: INTERNAL MEDICINE

## 2020-12-16 PROCEDURE — 1159F MED LIST DOCD IN RCRD: CPT | Mod: S$GLB,,, | Performed by: INTERNAL MEDICINE

## 2020-12-16 PROCEDURE — 99214 OFFICE O/P EST MOD 30 MIN: CPT | Mod: S$GLB,,, | Performed by: INTERNAL MEDICINE

## 2020-12-16 PROCEDURE — 1101F PR PT FALLS ASSESS DOC 0-1 FALLS W/OUT INJ PAST YR: ICD-10-PCS | Mod: CPTII,S$GLB,, | Performed by: INTERNAL MEDICINE

## 2020-12-16 PROCEDURE — 3288F FALL RISK ASSESSMENT DOCD: CPT | Mod: CPTII,S$GLB,, | Performed by: INTERNAL MEDICINE

## 2020-12-16 PROCEDURE — 1126F PR PAIN SEVERITY QUANTIFIED, NO PAIN PRESENT: ICD-10-PCS | Mod: S$GLB,,, | Performed by: INTERNAL MEDICINE

## 2020-12-16 PROCEDURE — 1101F PT FALLS ASSESS-DOCD LE1/YR: CPT | Mod: CPTII,S$GLB,, | Performed by: INTERNAL MEDICINE

## 2020-12-16 PROCEDURE — 1126F AMNT PAIN NOTED NONE PRSNT: CPT | Mod: S$GLB,,, | Performed by: INTERNAL MEDICINE

## 2020-12-16 PROCEDURE — 3288F PR FALLS RISK ASSESSMENT DOCUMENTED: ICD-10-PCS | Mod: CPTII,S$GLB,, | Performed by: INTERNAL MEDICINE

## 2020-12-16 NOTE — ASSESSMENT & PLAN NOTE
Continue on statin therapy with Lipitor 10 mg a day maintain low-fat low-cholesterol diet repeat labs.

## 2020-12-16 NOTE — ASSESSMENT & PLAN NOTE
A blood pressure is much better controlled /76 currently continue on metoprolol succinate 25 mg daily I have encouraged  her to consider adding magnesium oxide 400 mg daily

## 2020-12-16 NOTE — PROGRESS NOTES
" Subjective:    Patient ID:  Farrah Boston is a 75 y.o. female patient here for evaluation Hyperlipidemia and Hypertension      History of Present Illness:     Patient is here for follow-up evaluation she is doing remarkably well no occurrence of any angina shortness of breath PND orthopnea noted.  With this COVID pandemic she is coping really well are.          Review of patient's allergies indicates:   Allergen Reactions    Morphine Shortness Of Breath     Patient states her" breathing stops"    Codeine Nausea And Vomiting       Past Medical History:   Diagnosis Date    Allergy     Anxiety     Depression     GERD (gastroesophageal reflux disease)     Need for hepatitis C screening test 2/28/2018    Negative    Osteopenia     Reflux esophagitis     Seasonal allergies      Past Surgical History:   Procedure Laterality Date    ABDOMINAL ADHESION SURGERY      ANTERIOR VAGINAL REPAIR      BREAST BIOPSY      benign    CHOLECYSTECTOMY      HERNIA REPAIR      HYSTERECTOMY       Social History     Tobacco Use    Smoking status: Never Smoker    Smokeless tobacco: Never Used   Substance Use Topics    Alcohol use: Yes     Comment: rarely    Drug use: No        Review of Systems:    As noted in HPI in addition      REVIEW OF SYSTEMS  CARDIOVASCULAR: No recent chest pain, palpitations, arm, neck, or jaw pain  RESPIRATORY: No recent fever, cough chills, SOB or congestion  : No blood in the urine  GI: No Nausea, vomiting, constipation, diarrhea, blood, or reflux.  MUSCULOSKELETAL: No myalgias  NEURO: No lightheadedness or dizziness  EYES: No Double vision, blurry, vision or headache              Objective        Vitals:    12/16/20 1550   BP: 124/74   Pulse: 80   Resp: 16       LIPIDS - LAST 2   Lab Results   Component Value Date    CHOL 152 11/27/2019    CHOL 223 (H) 05/23/2011    HDL 54 11/27/2019    HDL 50 05/23/2011    LDLCALC 79.0 11/27/2019    LDLCALC 146.2 05/23/2011    TRIG 95 11/27/2019    TRIG 134 " 05/23/2011    CHOLHDL 35.5 11/27/2019    CHOLHDL 22.4 05/23/2011       CBC - LAST 2  Lab Results   Component Value Date    WBC 6.65 06/19/2020    WBC 5.03 06/14/2011    RBC 4.70 06/19/2020    RBC 4.68 06/14/2011    HGB 14.5 06/19/2020    HGB 13.4 06/14/2011    HCT 43.4 06/19/2020    HCT 40.8 06/14/2011    MCV 92 06/19/2020    MCV 87.2 06/14/2011    MCH 30.9 06/19/2020    MCH 28.6 06/14/2011    MCHC 33.4 06/19/2020    MCHC 32.8 06/14/2011    RDW 12.3 06/19/2020    RDW 13.5 06/14/2011     06/19/2020     (H) 06/14/2011    MPV 10.0 06/19/2020    MPV 10.3 06/14/2011    GRAN 3.3 06/19/2020    GRAN 49.2 06/19/2020    LYMPH 2.5 06/19/2020    LYMPH 37.7 06/19/2020    MONO 0.8 06/19/2020    MONO 11.7 06/19/2020    BASO 0.02 06/19/2020    BASO 0.0 06/14/2011    NRBC 0 06/19/2020       CHEMISTRY & LIVER FUNCTION - LAST 2  Lab Results   Component Value Date     06/19/2020     06/14/2011    K 3.8 06/19/2020    K 5.4 (H) 06/14/2011     06/19/2020     06/14/2011    CO2 28 06/19/2020    CO2 31 (H) 06/14/2011    ANIONGAP 10 06/19/2020    ANIONGAP 13 06/14/2011    BUN 15 06/19/2020    BUN 19 06/14/2011    CREATININE 0.6 06/19/2020    CREATININE 0.8 06/14/2011    GLU 92 06/19/2020    GLU 93 06/14/2011    CALCIUM 9.5 06/19/2020    CALCIUM 9.7 06/14/2011    MG 2.5 (H) 03/01/2005    ALBUMIN 4.4 06/19/2020    ALBUMIN 4.6 11/27/2019    PROT 7.8 06/19/2020    PROT 7.6 11/27/2019    ALKPHOS 91 06/19/2020    ALKPHOS 75 11/27/2019    ALT 36 06/19/2020    ALT 37 11/27/2019    AST 23 06/19/2020    AST 27 11/27/2019    BILITOT 0.6 06/19/2020    BILITOT 0.8 11/27/2019        CARDIAC PROFILE - LAST 2  No results found for: BNP, CPK, CPKMB, LDH, TROPONINI     COAGULATION - LAST 2  Lab Results   Component Value Date    INR 1.0 06/19/2009    APTT 27.4 06/19/2009       ENDOCRINE & PSA - LAST 2  Lab Results   Component Value Date    TSH 3.53 11/25/2009    TSH 2.9 07/01/2008        ECHOCARDIOGRAM RESULTS  No results  found for this or any previous visit.    CURRENT/PREVIOUS VISIT EKG  No results found for this or any previous visit.  No procedure found.   No results found for this or any previous visit.  No procedure found.    PHYSICAL EXAM  CONSTITUTIONAL: Well built, well nourished in no apparent distress  NECK: no carotid bruit, no JVD  LUNGS: CTA  CHEST WALL: no tenderness  HEART: regular rate and rhythm, S1, S2 normal, no murmur, click, rub or gallop   ABDOMEN: soft, non-tender; bowel sounds normal; no masses,  no organomegaly  EXTREMITIES: Extremities normal, no edema, no calf tenderness noted  NEURO: AAO X 3    I HAVE REVIEWED :    The vital signs, nurses notes, and all the pertinent radiology and labs.        Current Outpatient Medications   Medication Instructions    atorvastatin (LIPITOR) 10 MG tablet TAKE 1 TABLET EVERY DAY    calcium-vitamin D3 (OS-LUIS 500 + D3) 500 mg(1,250mg) -200 unit per tablet 1 tablet, Oral, 2 times daily with meals    coQ10, ubiquinol, 200 mg Cap CoQ10    estradiol (ESTRACE) 0.01 % (0.1 mg/gram) vaginal cream Vaginal, Daily    metoprolol succinate (TOPROL-XL) 25 mg, Oral, Daily    multivit-min/iron/folic/lutein (CENTRUM SILVER WOMEN ORAL) Centrum Silver          Assessment & Plan     Essential hypertension  A blood pressure is much better controlled /76 currently continue on metoprolol succinate 25 mg daily I have encouraged  her to consider adding magnesium oxide 400 mg daily    Dyslipidemia  Continue on statin therapy with Lipitor 10 mg a day maintain low-fat low-cholesterol diet repeat labs.    Gastroesophageal reflux disease without esophagitis  Symptoms have been relatively well controlled continue conservative treatment with p.r.n. usage of PPI agents.          Follow up in about 6 months (around 6/16/2021).

## 2021-01-09 ENCOUNTER — IMMUNIZATION (OUTPATIENT)
Dept: PRIMARY CARE CLINIC | Facility: CLINIC | Age: 76
End: 2021-01-09
Payer: MEDICARE

## 2021-01-09 DIAGNOSIS — Z23 NEED FOR VACCINATION: ICD-10-CM

## 2021-01-09 PROCEDURE — 0001A COVID-19, MRNA, LNP-S, PF, 30 MCG/0.3 ML DOSE VACCINE: ICD-10-PCS | Mod: S$GLB,,, | Performed by: FAMILY MEDICINE

## 2021-01-09 PROCEDURE — 91300 COVID-19, MRNA, LNP-S, PF, 30 MCG/0.3 ML DOSE VACCINE: CPT | Mod: S$GLB,,, | Performed by: FAMILY MEDICINE

## 2021-01-09 PROCEDURE — 0001A COVID-19, MRNA, LNP-S, PF, 30 MCG/0.3 ML DOSE VACCINE: CPT | Mod: S$GLB,,, | Performed by: FAMILY MEDICINE

## 2021-01-09 PROCEDURE — 91300 COVID-19, MRNA, LNP-S, PF, 30 MCG/0.3 ML DOSE VACCINE: ICD-10-PCS | Mod: S$GLB,,, | Performed by: FAMILY MEDICINE

## 2021-01-16 ENCOUNTER — PATIENT MESSAGE (OUTPATIENT)
Dept: ADMINISTRATIVE | Facility: OTHER | Age: 76
End: 2021-01-16

## 2021-01-16 ENCOUNTER — NURSE TRIAGE (OUTPATIENT)
Dept: ADMINISTRATIVE | Facility: CLINIC | Age: 76
End: 2021-01-16

## 2021-01-19 ENCOUNTER — CLINICAL SUPPORT (OUTPATIENT)
Dept: URGENT CARE | Facility: CLINIC | Age: 76
End: 2021-01-19
Payer: MEDICARE

## 2021-01-19 VITALS — HEART RATE: 74 BPM | OXYGEN SATURATION: 97 % | TEMPERATURE: 98 F

## 2021-01-19 DIAGNOSIS — R05.9 COUGH: Primary | ICD-10-CM

## 2021-01-19 DIAGNOSIS — Z11.52 ENCOUNTER FOR SCREENING LABORATORY TESTING FOR COVID-19 VIRUS: ICD-10-CM

## 2021-01-19 DIAGNOSIS — R09.81 SINUS CONGESTION: ICD-10-CM

## 2021-01-19 LAB
CTP QC/QA: YES
SARS-COV-2 RDRP RESP QL NAA+PROBE: NEGATIVE

## 2021-01-19 PROCEDURE — U0002: ICD-10-PCS | Mod: QW,S$GLB,, | Performed by: PHYSICIAN ASSISTANT

## 2021-01-19 PROCEDURE — U0002 COVID-19 LAB TEST NON-CDC: HCPCS | Mod: QW,S$GLB,, | Performed by: PHYSICIAN ASSISTANT

## 2021-01-30 ENCOUNTER — IMMUNIZATION (OUTPATIENT)
Dept: PRIMARY CARE CLINIC | Facility: CLINIC | Age: 76
End: 2021-01-30
Payer: MEDICARE

## 2021-01-30 DIAGNOSIS — Z23 NEED FOR VACCINATION: Primary | ICD-10-CM

## 2021-01-30 PROCEDURE — 0002A COVID-19, MRNA, LNP-S, PF, 30 MCG/0.3 ML DOSE VACCINE: CPT | Mod: S$GLB,,, | Performed by: FAMILY MEDICINE

## 2021-01-30 PROCEDURE — 91300 COVID-19, MRNA, LNP-S, PF, 30 MCG/0.3 ML DOSE VACCINE: CPT | Mod: S$GLB,,, | Performed by: FAMILY MEDICINE

## 2021-01-30 PROCEDURE — 91300 COVID-19, MRNA, LNP-S, PF, 30 MCG/0.3 ML DOSE VACCINE: ICD-10-PCS | Mod: S$GLB,,, | Performed by: FAMILY MEDICINE

## 2021-01-30 PROCEDURE — 0002A COVID-19, MRNA, LNP-S, PF, 30 MCG/0.3 ML DOSE VACCINE: ICD-10-PCS | Mod: S$GLB,,, | Performed by: FAMILY MEDICINE

## 2021-02-14 ENCOUNTER — OFFICE VISIT (OUTPATIENT)
Dept: URGENT CARE | Facility: CLINIC | Age: 76
End: 2021-02-14
Payer: MEDICARE

## 2021-02-14 ENCOUNTER — NURSE TRIAGE (OUTPATIENT)
Dept: ADMINISTRATIVE | Facility: CLINIC | Age: 76
End: 2021-02-14

## 2021-02-14 VITALS
RESPIRATION RATE: 17 BRPM | TEMPERATURE: 98 F | SYSTOLIC BLOOD PRESSURE: 142 MMHG | HEIGHT: 66 IN | BODY MASS INDEX: 22.5 KG/M2 | WEIGHT: 140 LBS | DIASTOLIC BLOOD PRESSURE: 82 MMHG | HEART RATE: 90 BPM | OXYGEN SATURATION: 99 %

## 2021-02-14 DIAGNOSIS — R43.2 TASTE ABSENT: Primary | ICD-10-CM

## 2021-02-14 DIAGNOSIS — U07.1 COVID-19: ICD-10-CM

## 2021-02-14 LAB
CTP QC/QA: YES
SARS-COV-2 RDRP RESP QL NAA+PROBE: POSITIVE

## 2021-02-14 PROCEDURE — U0002: ICD-10-PCS | Mod: QW,CR,S$GLB, | Performed by: PHYSICIAN ASSISTANT

## 2021-02-14 PROCEDURE — U0002 COVID-19 LAB TEST NON-CDC: HCPCS | Mod: QW,CR,S$GLB, | Performed by: PHYSICIAN ASSISTANT

## 2021-02-14 PROCEDURE — 3008F PR BODY MASS INDEX (BMI) DOCUMENTED: ICD-10-PCS | Mod: CPTII,S$GLB,, | Performed by: PHYSICIAN ASSISTANT

## 2021-02-14 PROCEDURE — 99214 OFFICE O/P EST MOD 30 MIN: CPT | Mod: S$GLB,,, | Performed by: PHYSICIAN ASSISTANT

## 2021-02-14 PROCEDURE — 99214 PR OFFICE/OUTPT VISIT, EST, LEVL IV, 30-39 MIN: ICD-10-PCS | Mod: S$GLB,,, | Performed by: PHYSICIAN ASSISTANT

## 2021-02-14 PROCEDURE — 3008F BODY MASS INDEX DOCD: CPT | Mod: CPTII,S$GLB,, | Performed by: PHYSICIAN ASSISTANT

## 2021-02-15 ENCOUNTER — PATIENT MESSAGE (OUTPATIENT)
Dept: ADMINISTRATIVE | Facility: OTHER | Age: 76
End: 2021-02-15

## 2021-02-15 ENCOUNTER — INFUSION (OUTPATIENT)
Dept: INFECTIOUS DISEASES | Facility: HOSPITAL | Age: 76
End: 2021-02-15
Attending: INTERNAL MEDICINE
Payer: MEDICARE

## 2021-02-15 ENCOUNTER — TELEPHONE (OUTPATIENT)
Dept: ADMINISTRATIVE | Facility: CLINIC | Age: 76
End: 2021-02-15

## 2021-02-15 VITALS
BODY MASS INDEX: 22.82 KG/M2 | RESPIRATION RATE: 20 BRPM | HEIGHT: 66 IN | TEMPERATURE: 98 F | HEART RATE: 68 BPM | SYSTOLIC BLOOD PRESSURE: 149 MMHG | DIASTOLIC BLOOD PRESSURE: 71 MMHG | OXYGEN SATURATION: 97 % | WEIGHT: 142 LBS

## 2021-02-15 DIAGNOSIS — U07.1 COVID-19: Primary | ICD-10-CM

## 2021-02-15 PROCEDURE — 25000003 PHARM REV CODE 250

## 2021-02-15 PROCEDURE — 63600175 PHARM REV CODE 636 W HCPCS

## 2021-02-15 PROCEDURE — M0239 BAMLANIVIMAB-XXXX INFUSION: HCPCS

## 2021-02-15 RX ORDER — ONDANSETRON 4 MG/1
4 TABLET, ORALLY DISINTEGRATING ORAL ONCE AS NEEDED
Status: DISCONTINUED | OUTPATIENT
Start: 2021-02-15 | End: 2022-02-08

## 2021-02-15 RX ORDER — SODIUM CHLORIDE 0.9 % (FLUSH) 0.9 %
10 SYRINGE (ML) INJECTION
Status: DISCONTINUED | OUTPATIENT
Start: 2021-02-15 | End: 2022-01-12 | Stop reason: HOSPADM

## 2021-02-15 RX ORDER — ALBUTEROL SULFATE 90 UG/1
2 AEROSOL, METERED RESPIRATORY (INHALATION)
Status: DISCONTINUED | OUTPATIENT
Start: 2021-02-15 | End: 2022-02-08 | Stop reason: ALTCHOICE

## 2021-02-15 RX ORDER — EPINEPHRINE 0.1 MG/ML
0.3 INJECTION INTRAVENOUS
Status: DISCONTINUED | OUTPATIENT
Start: 2021-02-15 | End: 2022-01-12 | Stop reason: HOSPADM

## 2021-02-15 RX ORDER — ACETAMINOPHEN 325 MG/1
650 TABLET ORAL ONCE AS NEEDED
Status: DISCONTINUED | OUTPATIENT
Start: 2021-02-15 | End: 2022-02-08 | Stop reason: ALTCHOICE

## 2021-02-15 RX ORDER — DIPHENHYDRAMINE HYDROCHLORIDE 50 MG/ML
25 INJECTION INTRAMUSCULAR; INTRAVENOUS ONCE AS NEEDED
Status: DISCONTINUED | OUTPATIENT
Start: 2021-02-15 | End: 2022-01-12 | Stop reason: HOSPADM

## 2021-02-15 RX ADMIN — SODIUM CHLORIDE 700 MG: 9 INJECTION, SOLUTION INTRAVENOUS at 10:02

## 2021-02-16 ENCOUNTER — NURSE TRIAGE (OUTPATIENT)
Dept: ADMINISTRATIVE | Facility: CLINIC | Age: 76
End: 2021-02-16

## 2021-02-16 ENCOUNTER — PATIENT MESSAGE (OUTPATIENT)
Dept: ADMINISTRATIVE | Facility: OTHER | Age: 76
End: 2021-02-16

## 2021-02-17 ENCOUNTER — PATIENT MESSAGE (OUTPATIENT)
Dept: ADMINISTRATIVE | Facility: OTHER | Age: 76
End: 2021-02-17

## 2021-02-18 ENCOUNTER — PATIENT MESSAGE (OUTPATIENT)
Dept: ADMINISTRATIVE | Facility: OTHER | Age: 76
End: 2021-02-18

## 2021-02-19 ENCOUNTER — PATIENT MESSAGE (OUTPATIENT)
Dept: ADMINISTRATIVE | Facility: OTHER | Age: 76
End: 2021-02-19

## 2021-02-19 ENCOUNTER — PATIENT MESSAGE (OUTPATIENT)
Dept: FAMILY MEDICINE | Facility: CLINIC | Age: 76
End: 2021-02-19

## 2021-02-20 ENCOUNTER — PATIENT MESSAGE (OUTPATIENT)
Dept: ADMINISTRATIVE | Facility: OTHER | Age: 76
End: 2021-02-20

## 2021-02-21 ENCOUNTER — NURSE TRIAGE (OUTPATIENT)
Dept: ADMINISTRATIVE | Facility: CLINIC | Age: 76
End: 2021-02-21

## 2021-02-21 ENCOUNTER — PATIENT MESSAGE (OUTPATIENT)
Dept: ADMINISTRATIVE | Facility: CLINIC | Age: 76
End: 2021-02-21

## 2021-02-21 ENCOUNTER — PATIENT MESSAGE (OUTPATIENT)
Dept: ADMINISTRATIVE | Facility: OTHER | Age: 76
End: 2021-02-21

## 2021-02-22 ENCOUNTER — PATIENT MESSAGE (OUTPATIENT)
Dept: FAMILY MEDICINE | Facility: CLINIC | Age: 76
End: 2021-02-22

## 2021-02-22 ENCOUNTER — PATIENT MESSAGE (OUTPATIENT)
Dept: ADMINISTRATIVE | Facility: OTHER | Age: 76
End: 2021-02-22

## 2021-02-23 ENCOUNTER — PATIENT MESSAGE (OUTPATIENT)
Dept: ADMINISTRATIVE | Facility: OTHER | Age: 76
End: 2021-02-23

## 2021-02-24 ENCOUNTER — PATIENT MESSAGE (OUTPATIENT)
Dept: ADMINISTRATIVE | Facility: OTHER | Age: 76
End: 2021-02-24

## 2021-02-25 ENCOUNTER — PATIENT MESSAGE (OUTPATIENT)
Dept: ADMINISTRATIVE | Facility: OTHER | Age: 76
End: 2021-02-25

## 2021-02-25 ENCOUNTER — NURSE TRIAGE (OUTPATIENT)
Dept: ADMINISTRATIVE | Facility: CLINIC | Age: 76
End: 2021-02-25

## 2021-02-26 ENCOUNTER — PATIENT MESSAGE (OUTPATIENT)
Dept: ADMINISTRATIVE | Facility: OTHER | Age: 76
End: 2021-02-26

## 2021-02-27 ENCOUNTER — NURSE TRIAGE (OUTPATIENT)
Dept: ADMINISTRATIVE | Facility: CLINIC | Age: 76
End: 2021-02-27

## 2021-02-27 ENCOUNTER — PATIENT MESSAGE (OUTPATIENT)
Dept: ADMINISTRATIVE | Facility: CLINIC | Age: 76
End: 2021-02-27

## 2021-02-27 ENCOUNTER — PATIENT MESSAGE (OUTPATIENT)
Dept: ADMINISTRATIVE | Facility: OTHER | Age: 76
End: 2021-02-27

## 2021-02-28 ENCOUNTER — PATIENT MESSAGE (OUTPATIENT)
Dept: ADMINISTRATIVE | Facility: OTHER | Age: 76
End: 2021-02-28

## 2021-03-03 ENCOUNTER — OFFICE VISIT (OUTPATIENT)
Dept: FAMILY MEDICINE | Facility: CLINIC | Age: 76
End: 2021-03-03
Payer: MEDICARE

## 2021-03-03 VITALS
WEIGHT: 141.5 LBS | OXYGEN SATURATION: 98 % | HEIGHT: 66 IN | TEMPERATURE: 98 F | HEART RATE: 83 BPM | BODY MASS INDEX: 22.74 KG/M2 | DIASTOLIC BLOOD PRESSURE: 77 MMHG | SYSTOLIC BLOOD PRESSURE: 127 MMHG

## 2021-03-03 DIAGNOSIS — M54.50 ACUTE RIGHT-SIDED LOW BACK PAIN, UNSPECIFIED WHETHER SCIATICA PRESENT: Primary | ICD-10-CM

## 2021-03-03 DIAGNOSIS — H92.02 LEFT EAR PAIN: ICD-10-CM

## 2021-03-03 PROCEDURE — 1170F FXNL STATUS ASSESSED: CPT | Mod: S$GLB,,, | Performed by: NURSE PRACTITIONER

## 2021-03-03 PROCEDURE — 1101F PT FALLS ASSESS-DOCD LE1/YR: CPT | Mod: S$GLB,,, | Performed by: NURSE PRACTITIONER

## 2021-03-03 PROCEDURE — 1159F MED LIST DOCD IN RCRD: CPT | Mod: S$GLB,,, | Performed by: NURSE PRACTITIONER

## 2021-03-03 PROCEDURE — 3074F PR MOST RECENT SYSTOLIC BLOOD PRESSURE < 130 MM HG: ICD-10-PCS | Mod: S$GLB,,, | Performed by: NURSE PRACTITIONER

## 2021-03-03 PROCEDURE — 3078F PR MOST RECENT DIASTOLIC BLOOD PRESSURE < 80 MM HG: ICD-10-PCS | Mod: S$GLB,,, | Performed by: NURSE PRACTITIONER

## 2021-03-03 PROCEDURE — 1170F PR FUNCTIONAL STATUS ASSESSED: ICD-10-PCS | Mod: S$GLB,,, | Performed by: NURSE PRACTITIONER

## 2021-03-03 PROCEDURE — 3288F FALL RISK ASSESSMENT DOCD: CPT | Mod: S$GLB,,, | Performed by: NURSE PRACTITIONER

## 2021-03-03 PROCEDURE — 1101F PR PT FALLS ASSESS DOC 0-1 FALLS W/OUT INJ PAST YR: ICD-10-PCS | Mod: S$GLB,,, | Performed by: NURSE PRACTITIONER

## 2021-03-03 PROCEDURE — 3074F SYST BP LT 130 MM HG: CPT | Mod: S$GLB,,, | Performed by: NURSE PRACTITIONER

## 2021-03-03 PROCEDURE — 3078F DIAST BP <80 MM HG: CPT | Mod: S$GLB,,, | Performed by: NURSE PRACTITIONER

## 2021-03-03 PROCEDURE — 3288F PR FALLS RISK ASSESSMENT DOCUMENTED: ICD-10-PCS | Mod: S$GLB,,, | Performed by: NURSE PRACTITIONER

## 2021-03-03 PROCEDURE — 3008F PR BODY MASS INDEX (BMI) DOCUMENTED: ICD-10-PCS | Mod: S$GLB,,, | Performed by: NURSE PRACTITIONER

## 2021-03-03 PROCEDURE — 99213 PR OFFICE/OUTPT VISIT, EST, LEVL III, 20-29 MIN: ICD-10-PCS | Mod: S$GLB,,, | Performed by: NURSE PRACTITIONER

## 2021-03-03 PROCEDURE — 1125F PR PAIN SEVERITY QUANTIFIED, PAIN PRESENT: ICD-10-PCS | Mod: S$GLB,,, | Performed by: NURSE PRACTITIONER

## 2021-03-03 PROCEDURE — 1125F AMNT PAIN NOTED PAIN PRSNT: CPT | Mod: S$GLB,,, | Performed by: NURSE PRACTITIONER

## 2021-03-03 PROCEDURE — 3008F BODY MASS INDEX DOCD: CPT | Mod: S$GLB,,, | Performed by: NURSE PRACTITIONER

## 2021-03-03 PROCEDURE — 1159F PR MEDICATION LIST DOCUMENTED IN MEDICAL RECORD: ICD-10-PCS | Mod: S$GLB,,, | Performed by: NURSE PRACTITIONER

## 2021-03-03 PROCEDURE — 99213 OFFICE O/P EST LOW 20 MIN: CPT | Mod: S$GLB,,, | Performed by: NURSE PRACTITIONER

## 2021-03-03 RX ORDER — NAPROXEN 500 MG/1
500 TABLET ORAL 2 TIMES DAILY WITH MEALS
Qty: 20 TABLET | Refills: 0 | Status: SHIPPED | OUTPATIENT
Start: 2021-03-03 | End: 2021-03-13

## 2021-03-03 RX ORDER — CYCLOBENZAPRINE HCL 5 MG
5 TABLET ORAL 3 TIMES DAILY PRN
Qty: 21 TABLET | Refills: 0 | Status: SHIPPED | OUTPATIENT
Start: 2021-03-03 | End: 2021-03-13

## 2021-03-05 ENCOUNTER — PATIENT MESSAGE (OUTPATIENT)
Dept: FAMILY MEDICINE | Facility: CLINIC | Age: 76
End: 2021-03-05

## 2021-06-10 ENCOUNTER — TELEPHONE (OUTPATIENT)
Dept: OBSTETRICS AND GYNECOLOGY | Facility: CLINIC | Age: 76
End: 2021-06-10

## 2021-06-10 DIAGNOSIS — Z12.31 VISIT FOR SCREENING MAMMOGRAM: Primary | ICD-10-CM

## 2021-06-14 ENCOUNTER — LAB VISIT (OUTPATIENT)
Dept: LAB | Facility: HOSPITAL | Age: 76
End: 2021-06-14
Attending: INTERNAL MEDICINE
Payer: MEDICARE

## 2021-06-14 DIAGNOSIS — E78.5 DYSLIPIDEMIA: ICD-10-CM

## 2021-06-14 DIAGNOSIS — I10 ESSENTIAL HYPERTENSION: ICD-10-CM

## 2021-06-14 LAB
ALBUMIN SERPL BCP-MCNC: 4.5 G/DL (ref 3.5–5.2)
ALP SERPL-CCNC: 80 U/L (ref 55–135)
ALT SERPL W/O P-5'-P-CCNC: 32 U/L (ref 10–44)
ANION GAP SERPL CALC-SCNC: 11 MMOL/L (ref 8–16)
AST SERPL-CCNC: 22 U/L (ref 10–40)
BILIRUB SERPL-MCNC: 0.9 MG/DL (ref 0.1–1)
BUN SERPL-MCNC: 14 MG/DL (ref 8–23)
CALCIUM SERPL-MCNC: 9.4 MG/DL (ref 8.7–10.5)
CHLORIDE SERPL-SCNC: 101 MMOL/L (ref 95–110)
CHOLEST SERPL-MCNC: 152 MG/DL (ref 120–199)
CHOLEST/HDLC SERPL: 3.5 {RATIO} (ref 2–5)
CO2 SERPL-SCNC: 28 MMOL/L (ref 23–29)
CREAT SERPL-MCNC: 0.6 MG/DL (ref 0.5–1.4)
ERYTHROCYTE [DISTWIDTH] IN BLOOD BY AUTOMATED COUNT: 12.4 % (ref 11.5–14.5)
EST. GFR  (AFRICAN AMERICAN): >60 ML/MIN/1.73 M^2
EST. GFR  (NON AFRICAN AMERICAN): >60 ML/MIN/1.73 M^2
GLUCOSE SERPL-MCNC: 86 MG/DL (ref 70–110)
HCT VFR BLD AUTO: 45.1 % (ref 37–48.5)
HDLC SERPL-MCNC: 44 MG/DL (ref 40–75)
HDLC SERPL: 28.9 % (ref 20–50)
HGB BLD-MCNC: 14.9 G/DL (ref 12–16)
LDLC SERPL CALC-MCNC: 85 MG/DL (ref 63–159)
MCH RBC QN AUTO: 30.8 PG (ref 27–31)
MCHC RBC AUTO-ENTMCNC: 33 G/DL (ref 32–36)
MCV RBC AUTO: 93 FL (ref 82–98)
NONHDLC SERPL-MCNC: 108 MG/DL
PLATELET # BLD AUTO: 331 K/UL (ref 150–450)
PMV BLD AUTO: 9.9 FL (ref 9.2–12.9)
POTASSIUM SERPL-SCNC: 3.8 MMOL/L (ref 3.5–5.1)
PROT SERPL-MCNC: 7.5 G/DL (ref 6–8.4)
RBC # BLD AUTO: 4.83 M/UL (ref 4–5.4)
SODIUM SERPL-SCNC: 140 MMOL/L (ref 136–145)
TRIGL SERPL-MCNC: 115 MG/DL (ref 30–150)
TSH SERPL DL<=0.005 MIU/L-ACNC: 5.36 UIU/ML (ref 0.34–5.6)
WBC # BLD AUTO: 6.03 K/UL (ref 3.9–12.7)

## 2021-06-14 PROCEDURE — 80061 LIPID PANEL: CPT | Performed by: INTERNAL MEDICINE

## 2021-06-14 PROCEDURE — 84443 ASSAY THYROID STIM HORMONE: CPT | Performed by: INTERNAL MEDICINE

## 2021-06-14 PROCEDURE — 80053 COMPREHEN METABOLIC PANEL: CPT | Performed by: INTERNAL MEDICINE

## 2021-06-14 PROCEDURE — 36415 COLL VENOUS BLD VENIPUNCTURE: CPT | Performed by: INTERNAL MEDICINE

## 2021-06-14 PROCEDURE — 85027 COMPLETE CBC AUTOMATED: CPT | Performed by: INTERNAL MEDICINE

## 2021-06-16 ENCOUNTER — OFFICE VISIT (OUTPATIENT)
Dept: CARDIOLOGY | Facility: CLINIC | Age: 76
End: 2021-06-16
Payer: MEDICARE

## 2021-06-16 VITALS
BODY MASS INDEX: 23.79 KG/M2 | SYSTOLIC BLOOD PRESSURE: 120 MMHG | WEIGHT: 147.38 LBS | DIASTOLIC BLOOD PRESSURE: 78 MMHG | OXYGEN SATURATION: 97 % | HEART RATE: 87 BPM

## 2021-06-16 DIAGNOSIS — K21.9 GASTROESOPHAGEAL REFLUX DISEASE WITHOUT ESOPHAGITIS: ICD-10-CM

## 2021-06-16 DIAGNOSIS — E78.5 DYSLIPIDEMIA: ICD-10-CM

## 2021-06-16 DIAGNOSIS — I10 ESSENTIAL HYPERTENSION: ICD-10-CM

## 2021-06-16 PROCEDURE — 1101F PR PT FALLS ASSESS DOC 0-1 FALLS W/OUT INJ PAST YR: ICD-10-PCS | Mod: CPTII,S$GLB,, | Performed by: INTERNAL MEDICINE

## 2021-06-16 PROCEDURE — 3078F PR MOST RECENT DIASTOLIC BLOOD PRESSURE < 80 MM HG: ICD-10-PCS | Mod: CPTII,S$GLB,, | Performed by: INTERNAL MEDICINE

## 2021-06-16 PROCEDURE — 3008F BODY MASS INDEX DOCD: CPT | Mod: CPTII,S$GLB,, | Performed by: INTERNAL MEDICINE

## 2021-06-16 PROCEDURE — 3074F SYST BP LT 130 MM HG: CPT | Mod: CPTII,S$GLB,, | Performed by: INTERNAL MEDICINE

## 2021-06-16 PROCEDURE — 1101F PT FALLS ASSESS-DOCD LE1/YR: CPT | Mod: CPTII,S$GLB,, | Performed by: INTERNAL MEDICINE

## 2021-06-16 PROCEDURE — 99214 PR OFFICE/OUTPT VISIT, EST, LEVL IV, 30-39 MIN: ICD-10-PCS | Mod: S$GLB,,, | Performed by: INTERNAL MEDICINE

## 2021-06-16 PROCEDURE — 3008F PR BODY MASS INDEX (BMI) DOCUMENTED: ICD-10-PCS | Mod: CPTII,S$GLB,, | Performed by: INTERNAL MEDICINE

## 2021-06-16 PROCEDURE — 99214 OFFICE O/P EST MOD 30 MIN: CPT | Mod: S$GLB,,, | Performed by: INTERNAL MEDICINE

## 2021-06-16 PROCEDURE — 3288F PR FALLS RISK ASSESSMENT DOCUMENTED: ICD-10-PCS | Mod: CPTII,S$GLB,, | Performed by: INTERNAL MEDICINE

## 2021-06-16 PROCEDURE — 3288F FALL RISK ASSESSMENT DOCD: CPT | Mod: CPTII,S$GLB,, | Performed by: INTERNAL MEDICINE

## 2021-06-16 PROCEDURE — 3078F DIAST BP <80 MM HG: CPT | Mod: CPTII,S$GLB,, | Performed by: INTERNAL MEDICINE

## 2021-06-16 PROCEDURE — 1126F PR PAIN SEVERITY QUANTIFIED, NO PAIN PRESENT: ICD-10-PCS | Mod: S$GLB,,, | Performed by: INTERNAL MEDICINE

## 2021-06-16 PROCEDURE — 1159F PR MEDICATION LIST DOCUMENTED IN MEDICAL RECORD: ICD-10-PCS | Mod: S$GLB,,, | Performed by: INTERNAL MEDICINE

## 2021-06-16 PROCEDURE — 3074F PR MOST RECENT SYSTOLIC BLOOD PRESSURE < 130 MM HG: ICD-10-PCS | Mod: CPTII,S$GLB,, | Performed by: INTERNAL MEDICINE

## 2021-06-16 PROCEDURE — 1126F AMNT PAIN NOTED NONE PRSNT: CPT | Mod: S$GLB,,, | Performed by: INTERNAL MEDICINE

## 2021-06-16 PROCEDURE — 1159F MED LIST DOCD IN RCRD: CPT | Mod: S$GLB,,, | Performed by: INTERNAL MEDICINE

## 2021-06-22 ENCOUNTER — HOSPITAL ENCOUNTER (OUTPATIENT)
Dept: RADIOLOGY | Facility: HOSPITAL | Age: 76
Discharge: HOME | End: 2021-06-22
Attending: OBSTETRICS & GYNECOLOGY
Payer: MEDICARE

## 2021-06-22 ENCOUNTER — OFFICE VISIT (OUTPATIENT)
Dept: OBSTETRICS AND GYNECOLOGY | Facility: CLINIC | Age: 76
End: 2021-06-22
Payer: MEDICARE

## 2021-06-22 VITALS
DIASTOLIC BLOOD PRESSURE: 68 MMHG | WEIGHT: 141.13 LBS | HEIGHT: 66 IN | SYSTOLIC BLOOD PRESSURE: 122 MMHG | RESPIRATION RATE: 18 BRPM | BODY MASS INDEX: 22.68 KG/M2

## 2021-06-22 DIAGNOSIS — Z12.31 SCREENING MAMMOGRAM, ENCOUNTER FOR: ICD-10-CM

## 2021-06-22 DIAGNOSIS — Z12.31 SCREENING MAMMOGRAM, ENCOUNTER FOR: Primary | ICD-10-CM

## 2021-06-22 PROCEDURE — 77067 MAMMO DIGITAL SCREENING BILAT WITH TOMO: ICD-10-PCS | Mod: 26,,, | Performed by: RADIOLOGY

## 2021-06-22 PROCEDURE — 3288F PR FALLS RISK ASSESSMENT DOCUMENTED: ICD-10-PCS | Mod: CPTII,S$GLB,, | Performed by: OBSTETRICS & GYNECOLOGY

## 2021-06-22 PROCEDURE — 77063 MAMMO DIGITAL SCREENING BILAT WITH TOMO: ICD-10-PCS | Mod: 26,,, | Performed by: RADIOLOGY

## 2021-06-22 PROCEDURE — 1126F AMNT PAIN NOTED NONE PRSNT: CPT | Mod: S$GLB,,, | Performed by: OBSTETRICS & GYNECOLOGY

## 2021-06-22 PROCEDURE — 77067 SCR MAMMO BI INCL CAD: CPT | Mod: TC,PN

## 2021-06-22 PROCEDURE — 99999 PR PBB SHADOW E&M-EST. PATIENT-LVL III: ICD-10-PCS | Mod: PBBFAC,,, | Performed by: OBSTETRICS & GYNECOLOGY

## 2021-06-22 PROCEDURE — 1126F PR PAIN SEVERITY QUANTIFIED, NO PAIN PRESENT: ICD-10-PCS | Mod: S$GLB,,, | Performed by: OBSTETRICS & GYNECOLOGY

## 2021-06-22 PROCEDURE — 3008F BODY MASS INDEX DOCD: CPT | Mod: CPTII,S$GLB,, | Performed by: OBSTETRICS & GYNECOLOGY

## 2021-06-22 PROCEDURE — 3008F PR BODY MASS INDEX (BMI) DOCUMENTED: ICD-10-PCS | Mod: CPTII,S$GLB,, | Performed by: OBSTETRICS & GYNECOLOGY

## 2021-06-22 PROCEDURE — 1101F PT FALLS ASSESS-DOCD LE1/YR: CPT | Mod: CPTII,S$GLB,, | Performed by: OBSTETRICS & GYNECOLOGY

## 2021-06-22 PROCEDURE — 1101F PR PT FALLS ASSESS DOC 0-1 FALLS W/OUT INJ PAST YR: ICD-10-PCS | Mod: CPTII,S$GLB,, | Performed by: OBSTETRICS & GYNECOLOGY

## 2021-06-22 PROCEDURE — 3288F FALL RISK ASSESSMENT DOCD: CPT | Mod: CPTII,S$GLB,, | Performed by: OBSTETRICS & GYNECOLOGY

## 2021-06-22 PROCEDURE — 99999 PR PBB SHADOW E&M-EST. PATIENT-LVL III: CPT | Mod: PBBFAC,,, | Performed by: OBSTETRICS & GYNECOLOGY

## 2021-06-22 PROCEDURE — G0101 CA SCREEN;PELVIC/BREAST EXAM: HCPCS | Mod: S$GLB,,, | Performed by: OBSTETRICS & GYNECOLOGY

## 2021-06-22 PROCEDURE — 77063 BREAST TOMOSYNTHESIS BI: CPT | Mod: 26,,, | Performed by: RADIOLOGY

## 2021-06-22 PROCEDURE — 77067 SCR MAMMO BI INCL CAD: CPT | Mod: 26,,, | Performed by: RADIOLOGY

## 2021-06-22 PROCEDURE — G0101 PR CA SCREEN;PELVIC/BREAST EXAM: ICD-10-PCS | Mod: S$GLB,,, | Performed by: OBSTETRICS & GYNECOLOGY

## 2021-06-23 ENCOUNTER — PATIENT MESSAGE (OUTPATIENT)
Dept: OBSTETRICS AND GYNECOLOGY | Facility: CLINIC | Age: 76
End: 2021-06-23

## 2021-11-29 ENCOUNTER — TELEPHONE (OUTPATIENT)
Dept: FAMILY MEDICINE | Facility: CLINIC | Age: 76
End: 2021-11-29
Payer: MEDICARE

## 2021-12-21 ENCOUNTER — TELEPHONE (OUTPATIENT)
Dept: CARDIOLOGY | Facility: CLINIC | Age: 76
End: 2021-12-21
Payer: MEDICARE

## 2021-12-22 ENCOUNTER — OFFICE VISIT (OUTPATIENT)
Dept: CARDIOLOGY | Facility: CLINIC | Age: 76
End: 2021-12-22
Payer: MEDICARE

## 2021-12-22 VITALS
BODY MASS INDEX: 23.3 KG/M2 | RESPIRATION RATE: 16 BRPM | SYSTOLIC BLOOD PRESSURE: 148 MMHG | HEART RATE: 80 BPM | HEIGHT: 66 IN | DIASTOLIC BLOOD PRESSURE: 80 MMHG | WEIGHT: 145 LBS

## 2021-12-22 DIAGNOSIS — I10 ESSENTIAL HYPERTENSION: Primary | ICD-10-CM

## 2021-12-22 PROCEDURE — 99213 PR OFFICE/OUTPT VISIT, EST, LEVL III, 20-29 MIN: ICD-10-PCS | Mod: S$GLB,,, | Performed by: NURSE PRACTITIONER

## 2021-12-22 PROCEDURE — 1101F PT FALLS ASSESS-DOCD LE1/YR: CPT | Mod: CPTII,S$GLB,, | Performed by: NURSE PRACTITIONER

## 2021-12-22 PROCEDURE — 3079F PR MOST RECENT DIASTOLIC BLOOD PRESSURE 80-89 MM HG: ICD-10-PCS | Mod: CPTII,S$GLB,, | Performed by: NURSE PRACTITIONER

## 2021-12-22 PROCEDURE — 3288F PR FALLS RISK ASSESSMENT DOCUMENTED: ICD-10-PCS | Mod: CPTII,S$GLB,, | Performed by: NURSE PRACTITIONER

## 2021-12-22 PROCEDURE — 3079F DIAST BP 80-89 MM HG: CPT | Mod: CPTII,S$GLB,, | Performed by: NURSE PRACTITIONER

## 2021-12-22 PROCEDURE — 1126F PR PAIN SEVERITY QUANTIFIED, NO PAIN PRESENT: ICD-10-PCS | Mod: CPTII,S$GLB,, | Performed by: NURSE PRACTITIONER

## 2021-12-22 PROCEDURE — 3077F SYST BP >= 140 MM HG: CPT | Mod: CPTII,S$GLB,, | Performed by: NURSE PRACTITIONER

## 2021-12-22 PROCEDURE — 1101F PR PT FALLS ASSESS DOC 0-1 FALLS W/OUT INJ PAST YR: ICD-10-PCS | Mod: CPTII,S$GLB,, | Performed by: NURSE PRACTITIONER

## 2021-12-22 PROCEDURE — 1160F PR REVIEW ALL MEDS BY PRESCRIBER/CLIN PHARMACIST DOCUMENTED: ICD-10-PCS | Mod: CPTII,S$GLB,, | Performed by: NURSE PRACTITIONER

## 2021-12-22 PROCEDURE — 99213 OFFICE O/P EST LOW 20 MIN: CPT | Mod: S$GLB,,, | Performed by: NURSE PRACTITIONER

## 2021-12-22 PROCEDURE — 3288F FALL RISK ASSESSMENT DOCD: CPT | Mod: CPTII,S$GLB,, | Performed by: NURSE PRACTITIONER

## 2021-12-22 PROCEDURE — 1159F MED LIST DOCD IN RCRD: CPT | Mod: CPTII,S$GLB,, | Performed by: NURSE PRACTITIONER

## 2021-12-22 PROCEDURE — 1160F RVW MEDS BY RX/DR IN RCRD: CPT | Mod: CPTII,S$GLB,, | Performed by: NURSE PRACTITIONER

## 2021-12-22 PROCEDURE — 1159F PR MEDICATION LIST DOCUMENTED IN MEDICAL RECORD: ICD-10-PCS | Mod: CPTII,S$GLB,, | Performed by: NURSE PRACTITIONER

## 2021-12-22 PROCEDURE — 3077F PR MOST RECENT SYSTOLIC BLOOD PRESSURE >= 140 MM HG: ICD-10-PCS | Mod: CPTII,S$GLB,, | Performed by: NURSE PRACTITIONER

## 2021-12-22 PROCEDURE — 3008F BODY MASS INDEX DOCD: CPT | Mod: CPTII,S$GLB,, | Performed by: NURSE PRACTITIONER

## 2021-12-22 PROCEDURE — 3008F PR BODY MASS INDEX (BMI) DOCUMENTED: ICD-10-PCS | Mod: CPTII,S$GLB,, | Performed by: NURSE PRACTITIONER

## 2021-12-22 PROCEDURE — 1126F AMNT PAIN NOTED NONE PRSNT: CPT | Mod: CPTII,S$GLB,, | Performed by: NURSE PRACTITIONER

## 2021-12-30 ENCOUNTER — IMMUNIZATION (OUTPATIENT)
Dept: PRIMARY CARE CLINIC | Facility: CLINIC | Age: 76
End: 2021-12-30
Payer: MEDICARE

## 2021-12-30 DIAGNOSIS — Z23 NEED FOR VACCINATION: Primary | ICD-10-CM

## 2021-12-30 PROCEDURE — 91300 COVID-19, MRNA, LNP-S, PF, 30 MCG/0.3 ML DOSE VACCINE: CPT | Mod: S$GLB,,, | Performed by: FAMILY MEDICINE

## 2021-12-30 PROCEDURE — 0004A COVID-19, MRNA, LNP-S, PF, 30 MCG/0.3 ML DOSE VACCINE: ICD-10-PCS | Mod: S$GLB,,, | Performed by: FAMILY MEDICINE

## 2021-12-30 PROCEDURE — 0004A COVID-19, MRNA, LNP-S, PF, 30 MCG/0.3 ML DOSE VACCINE: CPT | Mod: S$GLB,,, | Performed by: FAMILY MEDICINE

## 2021-12-30 PROCEDURE — 91300 COVID-19, MRNA, LNP-S, PF, 30 MCG/0.3 ML DOSE VACCINE: ICD-10-PCS | Mod: S$GLB,,, | Performed by: FAMILY MEDICINE

## 2022-01-11 ENCOUNTER — HOSPITAL ENCOUNTER (OUTPATIENT)
Facility: HOSPITAL | Age: 77
Discharge: HOME | End: 2022-01-12
Attending: EMERGENCY MEDICINE | Admitting: INTERNAL MEDICINE
Payer: MEDICARE

## 2022-01-11 ENCOUNTER — TELEPHONE (OUTPATIENT)
Dept: CARDIOLOGY | Facility: CLINIC | Age: 77
End: 2022-01-11
Payer: MEDICARE

## 2022-01-11 DIAGNOSIS — R07.9 CHEST PAIN: Primary | ICD-10-CM

## 2022-01-11 DIAGNOSIS — U07.1 COVID-19: ICD-10-CM

## 2022-01-11 LAB
ALBUMIN SERPL BCP-MCNC: 5 G/DL (ref 3.5–5.2)
ALP SERPL-CCNC: 97 U/L (ref 55–135)
ALT SERPL W/O P-5'-P-CCNC: 48 U/L (ref 10–44)
ANION GAP SERPL CALC-SCNC: 11 MMOL/L (ref 8–16)
AST SERPL-CCNC: 31 U/L (ref 10–40)
BASOPHILS # BLD AUTO: 0.03 K/UL (ref 0–0.2)
BASOPHILS NFR BLD: 0.5 % (ref 0–1.9)
BILIRUB SERPL-MCNC: 0.7 MG/DL (ref 0.1–1)
BNP SERPL-MCNC: 20 PG/ML (ref 0–99)
BUN SERPL-MCNC: 18 MG/DL (ref 8–23)
CALCIUM SERPL-MCNC: 9.8 MG/DL (ref 8.7–10.5)
CHLORIDE SERPL-SCNC: 101 MMOL/L (ref 95–110)
CHOLEST SERPL-MCNC: 163 MG/DL (ref 120–199)
CHOLEST/HDLC SERPL: 3.6 {RATIO} (ref 2–5)
CO2 SERPL-SCNC: 26 MMOL/L (ref 23–29)
CREAT SERPL-MCNC: 0.7 MG/DL (ref 0.5–1.4)
DIFFERENTIAL METHOD: NORMAL
EOSINOPHIL # BLD AUTO: 0.1 K/UL (ref 0–0.5)
EOSINOPHIL NFR BLD: 1 % (ref 0–8)
ERYTHROCYTE [DISTWIDTH] IN BLOOD BY AUTOMATED COUNT: 12.4 % (ref 11.5–14.5)
EST. GFR  (AFRICAN AMERICAN): >60 ML/MIN/1.73 M^2
EST. GFR  (NON AFRICAN AMERICAN): >60 ML/MIN/1.73 M^2
GLUCOSE SERPL-MCNC: 114 MG/DL (ref 70–110)
HCT VFR BLD AUTO: 46.8 % (ref 37–48.5)
HDLC SERPL-MCNC: 45 MG/DL (ref 40–75)
HDLC SERPL: 27.6 % (ref 20–50)
HGB BLD-MCNC: 15.6 G/DL (ref 12–16)
IMM GRANULOCYTES # BLD AUTO: 0.01 K/UL (ref 0–0.04)
IMM GRANULOCYTES NFR BLD AUTO: 0.2 % (ref 0–0.5)
INR PPP: 1
LDLC SERPL CALC-MCNC: 83.6 MG/DL (ref 63–159)
LYMPHOCYTES # BLD AUTO: 2.1 K/UL (ref 1–4.8)
LYMPHOCYTES NFR BLD: 36 % (ref 18–48)
MCH RBC QN AUTO: 31 PG (ref 27–31)
MCHC RBC AUTO-ENTMCNC: 33.3 G/DL (ref 32–36)
MCV RBC AUTO: 93 FL (ref 82–98)
MONOCYTES # BLD AUTO: 0.5 K/UL (ref 0.3–1)
MONOCYTES NFR BLD: 8.1 % (ref 4–15)
NEUTROPHILS # BLD AUTO: 3.2 K/UL (ref 1.8–7.7)
NEUTROPHILS NFR BLD: 54.2 % (ref 38–73)
NONHDLC SERPL-MCNC: 118 MG/DL
NRBC BLD-RTO: 0 /100 WBC
PLATELET # BLD AUTO: 342 K/UL (ref 150–450)
PMV BLD AUTO: 9.7 FL (ref 9.2–12.9)
POTASSIUM SERPL-SCNC: 4.1 MMOL/L (ref 3.5–5.1)
PROT SERPL-MCNC: 8.3 G/DL (ref 6–8.4)
PROTHROMBIN TIME: 12.5 SEC (ref 11.4–13.7)
RBC # BLD AUTO: 5.04 M/UL (ref 4–5.4)
SARS-COV-2 RDRP RESP QL NAA+PROBE: NEGATIVE
SODIUM SERPL-SCNC: 138 MMOL/L (ref 136–145)
TRIGL SERPL-MCNC: 172 MG/DL (ref 30–150)
TROPONIN I SERPL DL<=0.01 NG/ML-MCNC: <0.03 NG/ML
TROPONIN I SERPL DL<=0.01 NG/ML-MCNC: <0.03 NG/ML
WBC # BLD AUTO: 5.95 K/UL (ref 3.9–12.7)

## 2022-01-11 PROCEDURE — U0002 COVID-19 LAB TEST NON-CDC: HCPCS | Performed by: NURSE PRACTITIONER

## 2022-01-11 PROCEDURE — 36415 COLL VENOUS BLD VENIPUNCTURE: CPT | Performed by: EMERGENCY MEDICINE

## 2022-01-11 PROCEDURE — G0378 HOSPITAL OBSERVATION PER HR: HCPCS

## 2022-01-11 PROCEDURE — 93010 EKG 12-LEAD: ICD-10-PCS | Mod: ,,, | Performed by: INTERNAL MEDICINE

## 2022-01-11 PROCEDURE — 93010 ELECTROCARDIOGRAM REPORT: CPT | Mod: ,,, | Performed by: INTERNAL MEDICINE

## 2022-01-11 PROCEDURE — 93005 ELECTROCARDIOGRAM TRACING: CPT | Performed by: INTERNAL MEDICINE

## 2022-01-11 PROCEDURE — 80053 COMPREHEN METABOLIC PANEL: CPT | Performed by: NURSE PRACTITIONER

## 2022-01-11 PROCEDURE — 85025 COMPLETE CBC W/AUTO DIFF WBC: CPT | Performed by: NURSE PRACTITIONER

## 2022-01-11 PROCEDURE — 84484 ASSAY OF TROPONIN QUANT: CPT | Performed by: NURSE PRACTITIONER

## 2022-01-11 PROCEDURE — G0378 HOSPITAL OBSERVATION PER HR: HCPCS | Mod: CS

## 2022-01-11 PROCEDURE — 83880 ASSAY OF NATRIURETIC PEPTIDE: CPT | Performed by: NURSE PRACTITIONER

## 2022-01-11 PROCEDURE — 25000003 PHARM REV CODE 250: Performed by: EMERGENCY MEDICINE

## 2022-01-11 PROCEDURE — 99285 EMERGENCY DEPT VISIT HI MDM: CPT | Mod: 25,CS

## 2022-01-11 PROCEDURE — 80061 LIPID PANEL: CPT | Performed by: EMERGENCY MEDICINE

## 2022-01-11 PROCEDURE — 85610 PROTHROMBIN TIME: CPT | Performed by: NURSE PRACTITIONER

## 2022-01-11 PROCEDURE — 36415 COLL VENOUS BLD VENIPUNCTURE: CPT | Performed by: INTERNAL MEDICINE

## 2022-01-11 PROCEDURE — 84484 ASSAY OF TROPONIN QUANT: CPT | Mod: 91 | Performed by: INTERNAL MEDICINE

## 2022-01-11 PROCEDURE — 83036 HEMOGLOBIN GLYCOSYLATED A1C: CPT | Performed by: EMERGENCY MEDICINE

## 2022-01-11 PROCEDURE — 25000003 PHARM REV CODE 250: Performed by: INTERNAL MEDICINE

## 2022-01-11 RX ORDER — ATORVASTATIN CALCIUM 40 MG/1
40 TABLET, FILM COATED ORAL DAILY
Status: DISCONTINUED | OUTPATIENT
Start: 2022-01-11 | End: 2022-01-12 | Stop reason: HOSPADM

## 2022-01-11 RX ORDER — NALOXONE HCL 0.4 MG/ML
0.02 VIAL (ML) INJECTION
Status: DISCONTINUED | OUTPATIENT
Start: 2022-01-11 | End: 2022-01-12 | Stop reason: HOSPADM

## 2022-01-11 RX ORDER — NAPROXEN SODIUM 220 MG/1
81 TABLET, FILM COATED ORAL DAILY
Status: DISCONTINUED | OUTPATIENT
Start: 2022-01-11 | End: 2022-01-12 | Stop reason: HOSPADM

## 2022-01-11 RX ORDER — ASPIRIN 325 MG
325 TABLET ORAL
Status: COMPLETED | OUTPATIENT
Start: 2022-01-11 | End: 2022-01-11

## 2022-01-11 RX ORDER — SODIUM CHLORIDE 0.9 % (FLUSH) 0.9 %
10 SYRINGE (ML) INJECTION EVERY 12 HOURS PRN
Status: DISCONTINUED | OUTPATIENT
Start: 2022-01-11 | End: 2022-01-12 | Stop reason: HOSPADM

## 2022-01-11 RX ORDER — MAGNESIUM 200 MG
400 TABLET ORAL ONCE
COMMUNITY

## 2022-01-11 RX ORDER — IBUPROFEN 200 MG
24 TABLET ORAL
Status: DISCONTINUED | OUTPATIENT
Start: 2022-01-11 | End: 2022-01-12 | Stop reason: HOSPADM

## 2022-01-11 RX ORDER — IBUPROFEN 200 MG
16 TABLET ORAL
Status: DISCONTINUED | OUTPATIENT
Start: 2022-01-11 | End: 2022-01-12 | Stop reason: HOSPADM

## 2022-01-11 RX ORDER — GLUCAGON 1 MG
1 KIT INJECTION
Status: DISCONTINUED | OUTPATIENT
Start: 2022-01-11 | End: 2022-01-12 | Stop reason: HOSPADM

## 2022-01-11 RX ORDER — ACETAMINOPHEN 325 MG/1
650 TABLET ORAL ONCE AS NEEDED
Status: DISCONTINUED | OUTPATIENT
Start: 2022-01-11 | End: 2022-01-12 | Stop reason: HOSPADM

## 2022-01-11 RX ORDER — METOPROLOL SUCCINATE 25 MG/1
25 TABLET, EXTENDED RELEASE ORAL DAILY
Status: DISCONTINUED | OUTPATIENT
Start: 2022-01-11 | End: 2022-01-12 | Stop reason: HOSPADM

## 2022-01-11 RX ADMIN — ATORVASTATIN CALCIUM 40 MG: 20 TABLET, FILM COATED ORAL at 05:01

## 2022-01-11 RX ADMIN — ASPIRIN 325 MG ORAL TABLET 325 MG: 325 PILL ORAL at 02:01

## 2022-01-11 RX ADMIN — METOPROLOL SUCCINATE 25 MG: 25 TABLET, FILM COATED, EXTENDED RELEASE ORAL at 05:01

## 2022-01-11 NOTE — FIRST PROVIDER EVALUATION
"   Emergency Department TeleTriage Encounter Note      CHIEF COMPLAINT    Chief Complaint   Patient presents with    Hypertension     SUNDAY    Chest Pain     RT, ONSET SUNDAY       VITAL SIGNS   Initial Vitals [01/11/22 1118]   BP Pulse Resp Temp SpO2   (!) 163/94 87 16 98 °F (36.7 °C) 98 %      MAP       --            ALLERGIES    Review of patient's allergies indicates:   Allergen Reactions    Morphine Shortness Of Breath     Patient states her" breathing stops"    Codeine Nausea And Vomiting       PROVIDER TRIAGE NOTE  This is a teletriage evaluation of a 76 y.o. female presenting to the ED complaining of chest pain. Patient reports chest pain that started last night. She had associated jaw pain and left arm tingling. This morning she reports arm pain and slight chest pain as well. She was instructed to come to ED by cardiologist.     Initial orders will be placed and care will be transferred to an alternate provider when patient is roomed for a full evaluation. Any additional orders and the final disposition will be determined by that provider.           ORDERS  Labs Reviewed   CBC W/ AUTO DIFFERENTIAL   COMPREHENSIVE METABOLIC PANEL   B-TYPE NATRIURETIC PEPTIDE   TROPONIN I   PROTIME-INR   SARS-COV-2 RNA AMPLIFICATION, QUAL       ED Orders (720h ago, onward)    Start Ordered     Status Ordering Provider    01/11/22 1133 01/11/22 1119  X-Ray Chest PA And Lateral  1 time imaging     Interpret      In process LEANN YOO    01/11/22 1120 01/11/22 1119  COVID-19 Rapid Screening  Once         Ordered LEANN YOO    01/11/22 1119 01/11/22 1119  Pulse Oximetry Continuous  Continuous         Ordered LEANN YOO    01/11/22 1119 01/11/22 1119  EKG 12-lead  Once         Ordered LEANN YOO    01/11/22 1119 01/11/22 1119  CBC auto differential  STAT         Ordered LEANN YOO    01/11/22 1119 01/11/22 1119  Comprehensive metabolic panel  STAT         Ordered LEANN YOO    " 01/11/22 1119 01/11/22 1119  Brain natriuretic peptide  STAT         Ordered ARIKDALEANN PAYNE    01/11/22 1119 01/11/22 1119  Troponin I  STAT         Ordered DARDARLEANN.    01/11/22 1119 01/11/22 1119  Protime-INR  STAT         Ordered DARDARLEANN.    01/11/22 1119 01/11/22 1119  Saline lock IV  Once         Ordered ARIKDALEANN PAYNE    01/11/22 1119 01/11/22 1119  Notify physician for persistent pain  Once         Ordered ARIKDALEANN PAYNE.    01/11/22 1119 01/11/22 1119  Cardiac Monitoring - Adult  Continuous        Comments: Notify Physician If:    Ordered LEANN YOO            Virtual Visit Note: The provider triage portion of this emergency department evaluation and documentation was performed via [employer] a HIPAA-compliant telemedicine application, in concert with a tele-presenter in the room. A face to face patient evaluation with one of my colleagues will occur once the patient is placed in an emergency department room.      DISCLAIMER: This note was prepared with [employer] voice recognition transcription software. Garbled syntax, mangled pronouns, and other bizarre constructions may be attributed to that software system.

## 2022-01-11 NOTE — TELEPHONE ENCOUNTER
Spoke with pt, recommended pt go to ED immediately. Pt verbalized understanding and states she will be at [facility] ED in about 20 minutes. Pt c/o chest pain, arm and jaw tingling with pain. Pt states symtoms first onset was about a week ago and the pain is getting more intense as time goes on.

## 2022-01-11 NOTE — SUBJECTIVE & OBJECTIVE
"Past Medical History:   Diagnosis Date    Allergy     Anxiety     Depression     GERD (gastroesophageal reflux disease)     Need for hepatitis C screening test 2/28/2018    Negative    Osteopenia     Reflux esophagitis     Seasonal allergies        Past Surgical History:   Procedure Laterality Date    ABDOMINAL ADHESION SURGERY      ANTERIOR VAGINAL REPAIR      BREAST BIOPSY      benign    CHOLECYSTECTOMY      HERNIA REPAIR      HYSTERECTOMY         Review of patient's allergies indicates:   Allergen Reactions    Morphine Shortness Of Breath     Patient states her" breathing stops"    Codeine Nausea And Vomiting       Current Facility-Administered Medications on File Prior to Encounter   Medication    acetaminophen tablet 650 mg    albuterol inhaler 2 puff    diphenhydrAMINE injection 25 mg    EPINEPHrine 0.1 mg/mL injection 0.3 mg    methylPREDNISolone sodium succinate injection 40 mg    ondansetron disintegrating tablet 4 mg    sodium chloride 0.9% 500 mL flush bag    sodium chloride 0.9% flush 10 mL     Current Outpatient Medications on File Prior to Encounter   Medication Sig    atorvastatin (LIPITOR) 10 MG tablet TAKE 1 TABLET EVERY DAY    calcium-vitamin D3 (OS-LUIS 500 + D3) 500 mg(1,250mg) -200 unit per tablet Take 1 tablet by mouth 2 (two) times daily with meals.    coQ10, ubiquinol, 200 mg Cap Take 1 tablet by mouth Daily.    estradiol (ESTRACE) 0.01 % (0.1 mg/gram) vaginal cream Place vaginally once daily.    magnesium 200 mg Tab Take 400 mg by mouth once.    metoprolol succinate (TOPROL-XL) 25 MG 24 hr tablet TAKE 1 TABLET (25 MG TOTAL) BY MOUTH ONCE DAILY.    multivit-min/iron/folic/lutein (CENTRUM SILVER WOMEN ORAL) Take 1 tablet by mouth Daily.    polyethylene glycol 3350 (MIRALAX ORAL) Take by mouth. Take 3/4 capful by mouth nightly as needed    pulse oximeter (PULSE OXIMETER) device by Apply Externally route 2 (two) times a day. Use twice daily at 8 AM and 3 PM and " record the value in MyChart as directed. (Patient not taking: No sig reported)     Family History     Problem Relation (Age of Onset)    Breast cancer Maternal Aunt (70), Maternal Aunt (80), Paternal Grandmother (35)    Cirrhosis Father    Hypertension Mother        Tobacco Use    Smoking status: Never Smoker    Smokeless tobacco: Never Used   Substance and Sexual Activity    Alcohol use: Yes     Comment: rarely    Drug use: No    Sexual activity: Yes     Partners: Male     Birth control/protection: None     Review of Systems   Constitutional: Negative for activity change and fever.   HENT: Negative for congestion.    Eyes: Negative for pain.   Respiratory: Negative for shortness of breath and wheezing.    Cardiovascular: Negative for chest pain and leg swelling.   Gastrointestinal: Negative for abdominal distention and abdominal pain.   Endocrine: Negative for polyuria.   Genitourinary: Negative for difficulty urinating.   Musculoskeletal: Negative for neck stiffness.   Skin: Negative for color change.   Allergic/Immunologic: Negative for immunocompromised state.   Neurological: Negative for dizziness and facial asymmetry.   Psychiatric/Behavioral: Negative for agitation and confusion.     Objective:     Vital Signs (Most Recent):  Temp: 98 °F (36.7 °C) (01/11/22 1118)  Pulse: 73 (01/11/22 1528)  Resp: 20 (01/11/22 1528)  BP: (!) 143/72 (01/11/22 1458)  SpO2: 95 % (01/11/22 1528) Vital Signs (24h Range):  Temp:  [98 °F (36.7 °C)] 98 °F (36.7 °C)  Pulse:  [71-87] 73  Resp:  [15-20] 20  SpO2:  [95 %-98 %] 95 %  BP: (143-168)/() 143/72     Weight: 64.9 kg (143 lb)  Body mass index is 23.08 kg/m².    Physical Exam  Vitals and nursing note reviewed.   HENT:      Head: Normocephalic and atraumatic.      Mouth/Throat:      Mouth: Mucous membranes are moist.   Eyes:      Conjunctiva/sclera: Conjunctivae normal.   Neck:      Vascular: No JVD.   Cardiovascular:      Heart sounds: Normal heart sounds.   Pulmonary:       Effort: Pulmonary effort is normal.      Breath sounds: Normal breath sounds.   Abdominal:      General: Bowel sounds are normal.      Palpations: Abdomen is soft.   Musculoskeletal:         General: Normal range of motion.      Cervical back: Normal range of motion.   Skin:     General: Skin is warm.   Neurological:      Mental Status: She is alert and oriented to person, place, and time.   Psychiatric:         Mood and Affect: Mood normal.             Significant Labs:   All pertinent labs within the past 24 hours have been reviewed.  CBC:   Recent Labs   Lab 01/11/22  1154   WBC 5.95   HGB 15.6   HCT 46.8        CMP:   Recent Labs   Lab 01/11/22  1154      K 4.1      CO2 26   *   BUN 18   CREATININE 0.7   CALCIUM 9.8   PROT 8.3   ALBUMIN 5.0   BILITOT 0.7   ALKPHOS 97   AST 31   ALT 48*   ANIONGAP 11   EGFRNONAA >60.0       Significant Imaging: I have reviewed all pertinent imaging results/findings within the past 24 hours.

## 2022-01-11 NOTE — TELEPHONE ENCOUNTER
----- Message from Redd King sent at 1/11/2022 10:02 AM CST -----  Contact: pt  Type: Needs Medical Advice    Who Called:pt  Best Call Back Number:[phone]    Additional Information: Requesting callback regarding BP irregular and discomfort in arm in jaw needing to be seen today pain in chest too      Please Advise-Thank you
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
## 2022-01-11 NOTE — ED PROVIDER NOTES
"Encounter Date: 1/11/2022       History     Chief Complaint   Patient presents with    Hypertension     SUNDAY    Chest Pain     RT, ONSET SUNDAY     Patient here with reported onset of chest pain described as a stabbing substernal pain prior to this she had onset of left arm pain then developed left jaw pain patient has a history of hypertension, hypercholesterolemia states that her blood pressure was elevated yesterday and at arrival in the emergency department she did discuss her symptoms with Dr. Bowen's office who recommended she come to the emergency department for further evaluation the chest pain and jaw pain is resolved today however the pain in her arm continues        Review of patient's allergies indicates:   Allergen Reactions    Morphine Shortness Of Breath     Patient states her" breathing stops"    Codeine Nausea And Vomiting     Past Medical History:   Diagnosis Date    Allergy     Anxiety     Depression     GERD (gastroesophageal reflux disease)     Need for hepatitis C screening test 2/28/2018    Negative    Osteopenia     Reflux esophagitis     Seasonal allergies      Past Surgical History:   Procedure Laterality Date    ABDOMINAL ADHESION SURGERY      ANTERIOR VAGINAL REPAIR      BREAST BIOPSY      benign    CHOLECYSTECTOMY      HERNIA REPAIR      HYSTERECTOMY       Family History   Problem Relation Age of Onset    Breast cancer Maternal Aunt 70    Breast cancer Maternal Aunt 80    Hypertension Mother     Cirrhosis Father     Breast cancer Paternal Grandmother 35    Ovarian cancer Neg Hx      Social History     Tobacco Use    Smoking status: Never Smoker    Smokeless tobacco: Never Used   Substance Use Topics    Alcohol use: Yes     Comment: rarely    Drug use: No     Review of Systems   Constitutional: Negative for chills, fatigue and fever.   HENT: Negative for congestion.    Eyes: Negative for photophobia.   Respiratory: Negative for cough and shortness of " breath.    Cardiovascular: Positive for chest pain. Negative for palpitations and leg swelling.   Gastrointestinal: Negative for abdominal pain, diarrhea, nausea and vomiting.   Genitourinary: Negative for dysuria.       Physical Exam     Initial Vitals [01/11/22 1118]   BP Pulse Resp Temp SpO2   (!) 163/94 87 16 98 °F (36.7 °C) 98 %      MAP       --         Physical Exam    Constitutional: She appears well-developed and well-nourished. No distress.   HENT:   Head: Normocephalic and atraumatic.   Right Ear: External ear normal.   Left Ear: External ear normal.   Mouth/Throat: Oropharynx is clear and moist.   Eyes: Conjunctivae and EOM are normal. Pupils are equal, round, and reactive to light.   Neck: Neck supple.   Normal range of motion.  Cardiovascular: Normal rate, regular rhythm, normal heart sounds and intact distal pulses.   Pulmonary/Chest: Breath sounds normal. No respiratory distress.   Abdominal: Abdomen is soft. Bowel sounds are normal. There is no abdominal tenderness.   Musculoskeletal:         General: No edema. Normal range of motion.      Cervical back: Normal range of motion and neck supple.     Neurological: She is alert and oriented to person, place, and time. GCS score is 15. GCS eye subscore is 4. GCS verbal subscore is 5. GCS motor subscore is 6.   Skin: Skin is warm and dry. Capillary refill takes less than 2 seconds. No rash noted.   Psychiatric: She has a normal mood and affect. Her behavior is normal.         ED Course   Procedures  Labs Reviewed   COMPREHENSIVE METABOLIC PANEL - Abnormal; Notable for the following components:       Result Value    Glucose 114 (*)     ALT 48 (*)     All other components within normal limits   CBC W/ AUTO DIFFERENTIAL   B-TYPE NATRIURETIC PEPTIDE   TROPONIN I   PROTIME-INR   SARS-COV-2 RNA AMPLIFICATION, QUAL   HEMOGLOBIN A1C   LIPID PANEL   TROPONIN I   HEMOGLOBIN A1C   LIPID PANEL   TROPONIN I        ECG Results          EKG 12-lead (In process)  Result  time 01/11/22 12:08:43    In process by Interface, Lab In [facility] (01/11/22 12:08:43)                 Narrative:    Test Reason : R07.9,    Vent. Rate : 079 BPM     Atrial Rate : 079 BPM     P-R Int : 152 ms          QRS Dur : 080 ms      QT Int : 402 ms       P-R-T Axes : 051 -15 055 degrees     QTc Int : 460 ms    Normal sinus rhythm  Normal ECG  When compared with ECG of 23-JUN-2009 14:36,  No significant change was found    Referred By: [email or web address]   SELF           Confirmed By:                             Imaging Results          X-Ray Chest PA And Lateral (Final result)  Result time 01/11/22 11:42:18   Procedure changed from X-Ray Chest AP Portable     Final result by Aditi Zeng MD (01/11/22 11:42:18)                 Narrative:    Reason: CHEST PAIN Hypertension; Chest Pain    FINDINGS:  PA and lateral chest without comparisons show normal cardiomediastinal silhouette.    Lungs are clear. Pulmonary vasculature is normal. Bones are normal.    IMPRESSION:  Normal chest.    Electronically signed by:  Aditi Zeng MD  1/11/2022 11:42 AM [facility] Workstation: [id number]                               Medications   acetaminophen tablet 650 mg (has no administration in time range)   atorvastatin tablet 40 mg (has no administration in time range)   metoprolol succinate (TOPROL-XL) 24 hr tablet 25 mg (has no administration in time range)   sodium chloride 0.9% flush 10 mL (has no administration in time range)   naloxone 0.4 mg/mL injection 0.02 mg (has no administration in time range)   glucose chewable tablet 16 g (has no administration in time range)   glucose chewable tablet 24 g (has no administration in time range)   dextrose 50% injection 12.5 g (has no administration in time range)   dextrose 50% injection 25 g (has no administration in time range)   glucagon (human recombinant) injection 1 mg (has no administration in time range)   aspirin chewable tablet 81 mg (has no administration in time range)    aspirin tablet 325 mg (325 mg Oral Given 1/11/22 1410)     Medical Decision Making:   ED Management:  Patient presents with onset of chest pain with a radiation to the jaw and left arm pain with multiple risk factors for coronary disease discussed the patient's findings with Dr. Carr who will evaluate patient in the emergency department for admission                      Clinical Impression:   Final diagnoses:  [R07.9] Chest pain          ED Disposition Condition    Observation               Phani Mendez MD  01/11/22 [id number]
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
## 2022-01-12 ENCOUNTER — TELEPHONE (OUTPATIENT)
Dept: CARDIOLOGY | Facility: CLINIC | Age: 77
End: 2022-01-12

## 2022-01-12 ENCOUNTER — CLINICAL SUPPORT (OUTPATIENT)
Dept: CARDIOLOGY | Facility: HOSPITAL | Age: 77
End: 2022-01-12
Attending: INTERNAL MEDICINE
Payer: MEDICARE

## 2022-01-12 VITALS
SYSTOLIC BLOOD PRESSURE: 116 MMHG | HEIGHT: 66 IN | RESPIRATION RATE: 20 BRPM | DIASTOLIC BLOOD PRESSURE: 58 MMHG | WEIGHT: 143.5 LBS | HEART RATE: 72 BPM | OXYGEN SATURATION: 93 % | BODY MASS INDEX: 23.06 KG/M2 | TEMPERATURE: 99 F

## 2022-01-12 PROBLEM — R07.9 CHEST PAIN: Status: RESOLVED | Noted: 2022-01-11 | Resolved: 2022-01-12

## 2022-01-12 LAB
CV STRESS BASE HR: 78 BPM
DIASTOLIC BLOOD PRESSURE: 93 MMHG
ESTIMATED AVG GLUCOSE: 111 MG/DL (ref 68–131)
HBA1C MFR BLD: 5.5 % (ref 4.5–6.2)
OHS CV CPX 1 MINUTE RECOVERY HEART RATE: 102 BPM
OHS CV CPX 85 PERCENT MAX PREDICTED HEART RATE MALE: 120
OHS CV CPX ESTIMATED METS: 7
OHS CV CPX MAX PREDICTED HEART RATE: 141
OHS CV CPX PATIENT IS FEMALE: 1
OHS CV CPX PATIENT IS MALE: 0
OHS CV CPX PEAK DIASTOLIC BLOOD PRESSURE: 81 MMHG
OHS CV CPX PEAK HEAR RATE: 119 BPM
OHS CV CPX PEAK RATE PRESSURE PRODUCT: NORMAL
OHS CV CPX PEAK SYSTOLIC BLOOD PRESSURE: 206 MMHG
OHS CV CPX PERCENT MAX PREDICTED HEART RATE ACHIEVED: 84
OHS CV CPX RATE PRESSURE PRODUCT PRESENTING: NORMAL
STRESS ECHO POST EXERCISE DUR MIN: 8 MINUTES
SYSTOLIC BLOOD PRESSURE: 164 MMHG
TROPONIN I SERPL DL<=0.01 NG/ML-MCNC: <0.03 NG/ML

## 2022-01-12 PROCEDURE — 93016 NUCLEAR STRESS TEST (CUPID ONLY): ICD-10-PCS | Mod: ,,, | Performed by: INTERNAL MEDICINE

## 2022-01-12 PROCEDURE — 25000003 PHARM REV CODE 250: Performed by: NURSE PRACTITIONER

## 2022-01-12 PROCEDURE — 93017 CV STRESS TEST TRACING ONLY: CPT

## 2022-01-12 PROCEDURE — 25000003 PHARM REV CODE 250: Performed by: INTERNAL MEDICINE

## 2022-01-12 PROCEDURE — 99215 OFFICE O/P EST HI 40 MIN: CPT | Mod: 25,,, | Performed by: INTERNAL MEDICINE

## 2022-01-12 PROCEDURE — 93018 CV STRESS TEST I&R ONLY: CPT | Mod: ,,, | Performed by: INTERNAL MEDICINE

## 2022-01-12 PROCEDURE — G0378 HOSPITAL OBSERVATION PER HR: HCPCS

## 2022-01-12 PROCEDURE — 99215 PR OFFICE/OUTPT VISIT, EST, LEVL V, 40-54 MIN: ICD-10-PCS | Mod: 25,,, | Performed by: INTERNAL MEDICINE

## 2022-01-12 PROCEDURE — 93018 NUCLEAR STRESS TEST (CUPID ONLY): ICD-10-PCS | Mod: ,,, | Performed by: INTERNAL MEDICINE

## 2022-01-12 PROCEDURE — 93016 CV STRESS TEST SUPVJ ONLY: CPT | Mod: ,,, | Performed by: INTERNAL MEDICINE

## 2022-01-12 RX ORDER — SERTRALINE HYDROCHLORIDE 25 MG/1
25 TABLET, FILM COATED ORAL DAILY
Qty: 30 TABLET | Refills: 11 | Status: SHIPPED | OUTPATIENT
Start: 2022-01-12 | End: 2022-02-08

## 2022-01-12 RX ORDER — AMLODIPINE BESYLATE 5 MG/1
5 TABLET ORAL DAILY
Status: DISCONTINUED | OUTPATIENT
Start: 2022-01-12 | End: 2022-01-12

## 2022-01-12 RX ORDER — NAPROXEN SODIUM 220 MG/1
81 TABLET, FILM COATED ORAL DAILY
Refills: 0
Start: 2022-01-13 | End: 2022-12-28

## 2022-01-12 RX ORDER — SERTRALINE HYDROCHLORIDE 25 MG/1
25 TABLET, FILM COATED ORAL DAILY
Status: DISCONTINUED | OUTPATIENT
Start: 2022-01-12 | End: 2022-01-12 | Stop reason: HOSPADM

## 2022-01-12 RX ORDER — AMLODIPINE BESYLATE 2.5 MG/1
2.5 TABLET ORAL DAILY
Status: DISCONTINUED | OUTPATIENT
Start: 2022-01-12 | End: 2022-01-12

## 2022-01-12 RX ADMIN — ASPIRIN 81 MG 81 MG: 81 TABLET ORAL at 10:01

## 2022-01-12 RX ADMIN — SERTRALINE HYDROCHLORIDE 25 MG: 25 TABLET ORAL at 12:01

## 2022-01-12 RX ADMIN — METOPROLOL SUCCINATE 25 MG: 25 TABLET, FILM COATED, EXTENDED RELEASE ORAL at 10:01

## 2022-01-12 NOTE — PLAN OF CARE
Medicare Outpatient Observation Notice was signed, explained and given to patient/caregiver on 01/12/2022 at 10:14am     addressed any questions or concerns.    Medicare Outpatient Observation Notice will be scanned into patient's medical record

## 2022-01-12 NOTE — PLAN OF CARE
01/12/22 1524   Final Note   Assessment Type Final Discharge Note   Anticipated Discharge Disposition Home   What phone number can be called within the next 1-3 days to see how you are doing after discharge? [phone]   Post-Acute Status   Discharge Delays (!) Personal Transportation       Discharge orders reviewed. No case management/discharge planning needs noted.
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
## 2022-01-12 NOTE — TELEPHONE ENCOUNTER
----- Message from Gualberto Strauss sent at 1/12/2022  3:10 PM CST -----  Type:  Sooner Apoointment Request    Caller is requesting a sooner appointment.  Caller declined first available appointment listed below.  Caller will not accept being placed on the waitlist and is requesting a message be sent to doctor.  Name of Caller: pt   When is the first available appointment? 5/22  Symptoms: hosp follow up   Would the patient rather a call back or a response via [facility]? Call back   Best Call Back Number:   Additional Information: call back and coordinate sooner appt

## 2022-01-12 NOTE — NURSING
Pt transferred to by wheelchair to have stress nely no acute distress notedt. Per dayshift nurse pt has remained NPO, tele monitor is attached to pt. Will await pt return.

## 2022-01-12 NOTE — DISCHARGE INSTRUCTIONS
Patient Education       Sertraline (SER paulette andujar)   Brand Names: US Zoloft   Brand Names: Brennan ACT Sertraline [DSC]; AG-Sertraline; APO-Sertraline; Auro-Sertraline; BIO-Sertraline; DOM-Sertraline; JAMP-Sertraline; Mar-Sertraline; MINT-Sertraline; MYLAN-Sertraline [DSC]; NRA-Sertraline; PMS-Sertraline; Priva-Sertraline; RAN-Sertraline; ANIRUDH-Sertraline; SANDOZ Sertraline [DSC]; TEVA-Sertraline; VAN-Sertraline [DSC]; Zoloft   Warning   · Drugs like this one have raised the chance of suicidal thoughts or actions in children and young adults. The risk may be greater in people who have had these thoughts or actions in the past. All people who take this drug need to be watched closely. Call the doctor right away if signs like low mood (depression), nervousness, restlessness, grouchiness, panic attacks, or changes in mood or actions are new or worse. Call the doctor right away if any thoughts or actions of suicide occur.    What is this drug used for?   · It is used to treat low mood (depression).  · It is used to treat obsessive-compulsive problems.  · It is used to treat panic attacks.  · It is used to treat post-traumatic stress.  · It is used to treat mood problems caused by monthly periods.  · It is used to treat social anxiety problems.  · It may be given to you for other reasons. Talk with the doctor.    What do I need to tell my doctor BEFORE I take this drug?   All products:   · If you are allergic to this drug; any part of this drug; or any other drugs, foods, or substances. Tell your doctor about the allergy and what signs you had.  · If you have liver disease.  · If you are taking any of these drugs: Linezolid or methylene blue.  · If you are taking pimozide.  · If you have taken certain drugs for depression or Parkinson's disease in the last 14 days. This includes isocarboxazid, phenelzine, tranylcypromine, selegiline, or rasagiline. Very high blood pressure may happen.  · If you are taking any drugs that can  cause a certain type of heartbeat that is not normal (prolonged QT interval). There are many drugs that can do this. Ask your doctor or pharmacist if you are not sure.  · If you are pregnant or may be pregnant. Some forms of this drug are not for use during pregnancy.  Oral solution:   · If you have a latex allergy. The dropper has rubber.  · If you are taking disulfiram.  This is not a list of all drugs or health problems that interact with this drug.  Tell your doctor and pharmacist about all of your drugs (prescription or OTC, natural products, vitamins) and health problems. You must check to make sure that it is safe for you to take this drug with all of your drugs and health problems. Do not start, stop, or change the dose of any drug without checking with your doctor.  What are some things I need to know or do while I take this drug?   · Tell all of your health care providers that you take this drug. This includes your doctors, nurses, pharmacists, and dentists.  · Avoid driving and doing other tasks or actions that call for you to be alert until you see how this drug affects you.  · Do not stop taking this drug all of a sudden without calling your doctor. You may have a greater risk of side effects. If you need to stop this drug, you will want to slowly stop it as ordered by your doctor.  · Avoid drinking alcohol while taking this drug.  · Talk with your doctor before you use marijuana, other forms of cannabis, or prescription or OTC drugs that may slow your actions.  · In depression, sleep and appetite may get better soon after starting this drug. Other low mood signs may take up to 4 weeks to get better.  · If you are allergic to tartrazine (FD&C Yellow No. 5), talk with your doctor. Some products have tartrazine.  · This drug may raise the chance of a broken bone. Talk with the doctor.  · This drug may raise the chance of bleeding. Sometimes, bleeding can be life-threatening. Talk with the doctor.  · This  drug can cause low sodium levels. Very low sodium levels can be life-threatening, leading to seizures, passing out, trouble breathing, or death.  · Some people may have a higher chance of eye problems with this drug. Your doctor may want you to have an eye exam to see if you have a higher chance of these eye problems. Call your doctor right away if you have eye pain, change in eyesight, or swelling or redness in or around the eye.  · A severe and sometimes deadly problem called serotonin syndrome may happen. The risk may be greater if you also take certain other drugs. Call your doctor right away if you have agitation; change in balance; confusion; hallucinations; fever; fast or abnormal heartbeat; flushing; muscle twitching or stiffness; seizures; shivering or shaking; sweating a lot; severe diarrhea, upset stomach, or throwing up; or very bad headache.  · This drug may affect certain lab tests. Tell all of your health care providers and lab workers that you take this drug.  · If you are 65 or older, use this drug with care. You could have more side effects.  · This drug is not approved for use in all children. Talk with the doctor to be sure that this drug is right for your child.  · If the patient is a child, use this drug with care. The risk of some side effects may be higher in children.  · This drug may affect growth in children and teens in some cases. They may need regular growth checks. Talk with the doctor.  · Taking this drug late in pregnancy may raise the chance of breathing or feeding problems, low body temperature, or withdrawal symptoms in the . Talk with the doctor.  · Tell your doctor if you are breast-feeding. You will need to talk about any risks to your baby.    What are some side effects that I need to call my doctor about right away?   WARNING/CAUTION: Even though it may be rare, some people may have very bad and sometimes deadly side effects when taking a drug. Tell your doctor or get  medical help right away if you have any of the following signs or symptoms that may be related to a very bad side effect:  · Signs of an allergic reaction, like rash; hives; itching; red, swollen, blistered, or peeling skin with or without fever; wheezing; tightness in the chest or throat; trouble breathing, swallowing, or talking; unusual hoarseness; or swelling of the mouth, face, lips, tongue, or throat.  · Signs of low sodium levels like headache, trouble focusing, memory problems, feeling confused, weakness, seizures, or change in balance.  · Signs of bleeding like throwing up or coughing up blood; vomit that looks like coffee grounds; blood in the urine; black, red, or tarry stools; bleeding from the gums; abnormal vaginal bleeding; bruises without a cause or that get bigger; or bleeding you cannot stop.  · Signs of a very bad skin reaction (Miranda-Vlad syndrome/toxic epidermal necrolysis) like red, swollen, blistered, or peeling skin (with or without fever); red or irritated eyes; or sores in the mouth, throat, nose, or eyes.  · Seizures.  · Not able to control bladder.  · A big weight gain or loss.  · Sex problems have happened with drugs like this one. This includes lowered interest in sex, trouble having an orgasm, ejaculation problems, or trouble getting or keeping an erection. If you have any sex problems or if you have any questions, talk with your doctor.  · Liver problems have rarely happened with this drug. Sometimes, this has been deadly. Call your doctor right away if you have signs of liver problems like dark urine, feeling tired, not hungry, upset stomach or stomach pain, light-colored stools, throwing up, or yellow skin or eyes.  · A type of abnormal heartbeat (prolonged QT interval) has happened with this drug. Sometimes, this has led to another type of unsafe abnormal heartbeat (torsades de pointes). Call your doctor right away if you have a fast or abnormal heartbeat, or if you pass  out.  What are some other side effects of this drug?   All drugs may cause side effects. However, many people have no side effects or only have minor side effects. Call your doctor or get medical help if any of these side effects or any other side effects bother you or do not go away:  · Feeling dizzy, sleepy, tired, or weak.  · Constipation, diarrhea, stomach pain, upset stomach, throwing up, or feeling less hungry.  · Dry mouth.  · Trouble sleeping.  · Sweating a lot.  · Shakiness.  These are not all of the side effects that may occur. If you have questions about side effects, call your doctor. Call your doctor for medical advice about side effects.  You may report side effects to your national health agency.  You may report side effects to the FDA at 1-238.957.3106. You may also report side effects at https://www.fda.gov/medwatch.  How is this drug best taken?   Use this drug as ordered by your doctor. Read all information given to you. Follow all instructions closely.  Tablets:   · Take with or without food.  · Keep taking this drug as you have been told by your doctor or other health care provider, even if you feel well.  Capsules:   · Take with or without food as you have been told by your doctor.  · Swallow whole. Do not chew, open, or crush.  · Keep taking this drug as you have been told by your doctor or other health care provider, even if you feel well.  Oral solution:   · Only use the measuring device that comes with this liquid drug.  · Mix liquid with 1/2 cup of water, ginger ale, lemon-lime soda, lemonade, or orange juice.  · After mixing, take your dose right away. Do not store for future use.  · This drug may look hazy after mixing. This is normal.  · Keep taking this drug as you have been told by your doctor or other health care provider, even if you feel well.  What do I do if I miss a dose?   · Take a missed dose as soon as you think about it.  · If it is close to the time for your next dose, skip  the missed dose and go back to your normal time.  · Do not take 2 doses at the same time or extra doses.    How do I store and/or throw out this drug?   · Store at room temperature in a dry place. Do not store in a bathroom.  · Keep lid tightly closed.  · Keep all drugs in a safe place. Keep all drugs out of the reach of children and pets.  · Throw away unused or  drugs. Do not flush down a toilet or pour down a drain unless you are told to do so. Check with your pharmacist if you have questions about the best way to throw out drugs. There may be drug take-back programs in your area.    General drug facts   · If your symptoms or health problems do not get better or if they become worse, call your doctor.  · Do not share your drugs with others and do not take anyone else's drugs.  · Some drugs may have another patient information leaflet. If you have any questions about this drug, please talk with your doctor, nurse, pharmacist, or other health care provider.  · This drug comes with an extra patient fact sheet called a Medication Guide. Read it with care. Read it again each time this drug is refilled. If you have any questions about this drug, please talk with the doctor, pharmacist, or other health care provider.  · If you think there has been an overdose, call your poison control center or get medical care right away. Be ready to tell or show what was taken, how much, and when it happened.    Consumer Information Use and Disclaimer   This generalized information is a limited summary of diagnosis, treatment, and/or medication information. It is not meant to be comprehensive and should be used as a tool to help the user understand and/or assess potential diagnostic and treatment options. It does NOT include all information about conditions, treatments, medications, side effects, or risks that may apply to a specific patient. It is not intended to be medical advice or a substitute for the medical advice,  diagnosis, or treatment of a health care provider based on the health care provider's examination and assessment of a patient's specific and unique circumstances. Patients must speak with a health care provider for complete information about their health, medical questions, and treatment options, including any risks or benefits regarding use of medications. This information does not endorse any treatments or medications as safe, effective, or approved for treating a specific patient. Up[employer] Inc[employer] and its affiliates disclaim any warranty or liability relating to this information or the use thereof. The use of this information is governed by the Terms of Use, available at https://www.[employer]/en/solutions/lexicomp/about/tana.  Last Reviewed Date   2021-10-25  Copyright   © 2021 Up[employer]Date, Inc[employer] and its affiliates and/or licensors. All rights reserved.
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
## 2022-01-12 NOTE — NURSING
Discharge instructions have been reviewed with patient, IV removed, tele monitor removed & cleaned and returned to tele monitor room. Pt  is transporting patient home.

## 2022-01-13 ENCOUNTER — TELEPHONE (OUTPATIENT)
Dept: FAMILY MEDICINE | Facility: CLINIC | Age: 77
End: 2022-01-13
Payer: MEDICARE

## 2022-01-13 ENCOUNTER — PATIENT OUTREACH (OUTPATIENT)
Dept: FAMILY MEDICINE | Facility: CLINIC | Age: 77
End: 2022-01-13
Payer: MEDICARE

## 2022-01-13 NOTE — TELEPHONE ENCOUNTER
----- Message from Gualberto Strauss sent at 1/12/2022  3:11 PM CST -----  Type:  Sooner Apoointment Request    Caller is requesting a sooner appointment.  Caller declined first available appointment listed below.  Caller will not accept being placed on the waitlist and is requesting a message be sent to doctor.  Name of Caller: pt   When is the first available appointment? 2/22  Symptoms: hosp follow up   Would the patient rather a call back or a response via [facility]? Call back   Best Call Back Number:   Additional Information: call back and coordinate sooner appt

## 2022-01-13 NOTE — PROGRESS NOTES
Discharge Information     Discharge Date:   1/12/22    Primary Discharge Diagnosis:  Chest pain      Discharge Summary:  Reviewed      Medication & Order Review     Were medication changes made or new medications added?   Yes    If so, has the patient filled the prescriptions?  Yes     Was Home Health ordered? No    If so, has Home Health contacted patient and/or initiated services?  N/A    Name of Home Health Agency? N/A    Durable Medical Equipment ordered?  No     If so, has the DME provider contacted patient and delivered equipment?  not applicable    Follow Up               Any problems since discharge? Yes does not like new BP med, she is going to call cardiologist    How is the patient feeling since returning home?  ok    Have you set up recommended follow up appointments?  (cardiology, surgery, etc.) Yes: Cardiology    Schedule Hospital Follow-up appointment within 7-14 days (preferably 7).      Notes:  Dr. Lomeli 1/20/22 1:20 pm      Becka Garcia

## 2022-01-13 NOTE — TELEPHONE ENCOUNTER
Attempted to reach patient and schedule hospital follow up appointment and ask TCC questions. No answer, no option for voice mail.

## 2022-01-28 ENCOUNTER — OFFICE VISIT (OUTPATIENT)
Dept: CARDIOLOGY | Facility: CLINIC | Age: 77
End: 2022-01-28
Payer: MEDICARE

## 2022-01-28 VITALS
HEART RATE: 67 BPM | RESPIRATION RATE: 16 BRPM | DIASTOLIC BLOOD PRESSURE: 78 MMHG | BODY MASS INDEX: 23.63 KG/M2 | HEIGHT: 66 IN | SYSTOLIC BLOOD PRESSURE: 124 MMHG | OXYGEN SATURATION: 97 % | WEIGHT: 147 LBS

## 2022-01-28 DIAGNOSIS — E78.5 DYSLIPIDEMIA: ICD-10-CM

## 2022-01-28 DIAGNOSIS — I10 ESSENTIAL HYPERTENSION: ICD-10-CM

## 2022-01-28 DIAGNOSIS — R79.89 ELEVATED LFTS: Primary | ICD-10-CM

## 2022-01-28 DIAGNOSIS — E78.2 ELEVATED TRIGLYCERIDES WITH HIGH CHOLESTEROL: ICD-10-CM

## 2022-01-28 PROCEDURE — 99214 OFFICE O/P EST MOD 30 MIN: CPT | Mod: S$GLB,,,

## 2022-01-28 PROCEDURE — 3074F SYST BP LT 130 MM HG: CPT | Mod: CPTII,S$GLB,,

## 2022-01-28 PROCEDURE — 1159F PR MEDICATION LIST DOCUMENTED IN MEDICAL RECORD: ICD-10-PCS | Mod: CPTII,S$GLB,,

## 2022-01-28 PROCEDURE — 1126F AMNT PAIN NOTED NONE PRSNT: CPT | Mod: CPTII,S$GLB,,

## 2022-01-28 PROCEDURE — 3288F FALL RISK ASSESSMENT DOCD: CPT | Mod: CPTII,S$GLB,,

## 2022-01-28 PROCEDURE — 1101F PR PT FALLS ASSESS DOC 0-1 FALLS W/OUT INJ PAST YR: ICD-10-PCS | Mod: CPTII,S$GLB,,

## 2022-01-28 PROCEDURE — 3288F PR FALLS RISK ASSESSMENT DOCUMENTED: ICD-10-PCS | Mod: CPTII,S$GLB,,

## 2022-01-28 PROCEDURE — 3078F DIAST BP <80 MM HG: CPT | Mod: CPTII,S$GLB,,

## 2022-01-28 PROCEDURE — 1160F RVW MEDS BY RX/DR IN RCRD: CPT | Mod: CPTII,S$GLB,,

## 2022-01-28 PROCEDURE — 3078F PR MOST RECENT DIASTOLIC BLOOD PRESSURE < 80 MM HG: ICD-10-PCS | Mod: CPTII,S$GLB,,

## 2022-01-28 PROCEDURE — 3044F HG A1C LEVEL LT 7.0%: CPT | Mod: CPTII,S$GLB,,

## 2022-01-28 PROCEDURE — 3008F BODY MASS INDEX DOCD: CPT | Mod: CPTII,S$GLB,,

## 2022-01-28 PROCEDURE — 1101F PT FALLS ASSESS-DOCD LE1/YR: CPT | Mod: CPTII,S$GLB,,

## 2022-01-28 PROCEDURE — 1159F MED LIST DOCD IN RCRD: CPT | Mod: CPTII,S$GLB,,

## 2022-01-28 PROCEDURE — 3044F PR MOST RECENT HEMOGLOBIN A1C LEVEL <7.0%: ICD-10-PCS | Mod: CPTII,S$GLB,,

## 2022-01-28 PROCEDURE — 3008F PR BODY MASS INDEX (BMI) DOCUMENTED: ICD-10-PCS | Mod: CPTII,S$GLB,,

## 2022-01-28 PROCEDURE — 99214 PR OFFICE/OUTPT VISIT, EST, LEVL IV, 30-39 MIN: ICD-10-PCS | Mod: S$GLB,,,

## 2022-01-28 PROCEDURE — 3074F PR MOST RECENT SYSTOLIC BLOOD PRESSURE < 130 MM HG: ICD-10-PCS | Mod: CPTII,S$GLB,,

## 2022-01-28 PROCEDURE — 1126F PR PAIN SEVERITY QUANTIFIED, NO PAIN PRESENT: ICD-10-PCS | Mod: CPTII,S$GLB,,

## 2022-01-28 PROCEDURE — 1160F PR REVIEW ALL MEDS BY PRESCRIBER/CLIN PHARMACIST DOCUMENTED: ICD-10-PCS | Mod: CPTII,S$GLB,,
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
## 2022-01-28 NOTE — ASSESSMENT & PLAN NOTE
LDL in hospital was 83  Triglycerides were elevated at 172, however so was her sugar  She is not sure if she was completely fasted when these were drawn during admission   ALT slightly elevated at 48   She is on lipitor 10 mg, will not change right now    Will recheck in 3 mo time     Low fat low cholesterol diet

## 2022-02-08 ENCOUNTER — OFFICE VISIT (OUTPATIENT)
Dept: FAMILY MEDICINE | Facility: CLINIC | Age: 77
End: 2022-02-08
Payer: MEDICARE

## 2022-02-08 VITALS
HEART RATE: 94 BPM | SYSTOLIC BLOOD PRESSURE: 138 MMHG | DIASTOLIC BLOOD PRESSURE: 70 MMHG | WEIGHT: 148 LBS | HEIGHT: 66 IN | BODY MASS INDEX: 23.78 KG/M2

## 2022-02-08 DIAGNOSIS — R74.01 ELEVATED ALT MEASUREMENT: ICD-10-CM

## 2022-02-08 DIAGNOSIS — R07.82 INTERCOSTAL PAIN: Primary | ICD-10-CM

## 2022-02-08 DIAGNOSIS — F41.9 ANXIETY: ICD-10-CM

## 2022-02-08 DIAGNOSIS — E78.2 MIXED HYPERLIPIDEMIA: Chronic | ICD-10-CM

## 2022-02-08 PROCEDURE — 1159F PR MEDICATION LIST DOCUMENTED IN MEDICAL RECORD: ICD-10-PCS | Mod: S$GLB,,, | Performed by: INTERNAL MEDICINE

## 2022-02-08 PROCEDURE — 1159F MED LIST DOCD IN RCRD: CPT | Mod: S$GLB,,, | Performed by: INTERNAL MEDICINE

## 2022-02-08 PROCEDURE — 3008F BODY MASS INDEX DOCD: CPT | Mod: S$GLB,,, | Performed by: INTERNAL MEDICINE

## 2022-02-08 PROCEDURE — 3044F HG A1C LEVEL LT 7.0%: CPT | Mod: S$GLB,,, | Performed by: INTERNAL MEDICINE

## 2022-02-08 PROCEDURE — 1101F PR PT FALLS ASSESS DOC 0-1 FALLS W/OUT INJ PAST YR: ICD-10-PCS | Mod: S$GLB,,, | Performed by: INTERNAL MEDICINE

## 2022-02-08 PROCEDURE — 99214 OFFICE O/P EST MOD 30 MIN: CPT | Mod: S$GLB,,, | Performed by: INTERNAL MEDICINE

## 2022-02-08 PROCEDURE — 3078F DIAST BP <80 MM HG: CPT | Mod: S$GLB,,, | Performed by: INTERNAL MEDICINE

## 2022-02-08 PROCEDURE — 99214 PR OFFICE/OUTPT VISIT, EST, LEVL IV, 30-39 MIN: ICD-10-PCS | Mod: S$GLB,,, | Performed by: INTERNAL MEDICINE

## 2022-02-08 PROCEDURE — 3288F FALL RISK ASSESSMENT DOCD: CPT | Mod: S$GLB,,, | Performed by: INTERNAL MEDICINE

## 2022-02-08 PROCEDURE — 1126F AMNT PAIN NOTED NONE PRSNT: CPT | Mod: S$GLB,,, | Performed by: INTERNAL MEDICINE

## 2022-02-08 PROCEDURE — 1101F PT FALLS ASSESS-DOCD LE1/YR: CPT | Mod: S$GLB,,, | Performed by: INTERNAL MEDICINE

## 2022-02-08 PROCEDURE — 3044F PR MOST RECENT HEMOGLOBIN A1C LEVEL <7.0%: ICD-10-PCS | Mod: S$GLB,,, | Performed by: INTERNAL MEDICINE

## 2022-02-08 PROCEDURE — 1160F RVW MEDS BY RX/DR IN RCRD: CPT | Mod: S$GLB,,, | Performed by: INTERNAL MEDICINE

## 2022-02-08 PROCEDURE — 1126F PR PAIN SEVERITY QUANTIFIED, NO PAIN PRESENT: ICD-10-PCS | Mod: S$GLB,,, | Performed by: INTERNAL MEDICINE

## 2022-02-08 PROCEDURE — 3078F PR MOST RECENT DIASTOLIC BLOOD PRESSURE < 80 MM HG: ICD-10-PCS | Mod: S$GLB,,, | Performed by: INTERNAL MEDICINE

## 2022-02-08 PROCEDURE — 3008F PR BODY MASS INDEX (BMI) DOCUMENTED: ICD-10-PCS | Mod: S$GLB,,, | Performed by: INTERNAL MEDICINE

## 2022-02-08 PROCEDURE — 1160F PR REVIEW ALL MEDS BY PRESCRIBER/CLIN PHARMACIST DOCUMENTED: ICD-10-PCS | Mod: S$GLB,,, | Performed by: INTERNAL MEDICINE

## 2022-02-08 PROCEDURE — 3075F SYST BP GE 130 - 139MM HG: CPT | Mod: S$GLB,,, | Performed by: INTERNAL MEDICINE

## 2022-02-08 PROCEDURE — 3075F PR MOST RECENT SYSTOLIC BLOOD PRESS GE 130-139MM HG: ICD-10-PCS | Mod: S$GLB,,, | Performed by: INTERNAL MEDICINE

## 2022-02-08 PROCEDURE — 3288F PR FALLS RISK ASSESSMENT DOCUMENTED: ICD-10-PCS | Mod: S$GLB,,, | Performed by: INTERNAL MEDICINE

## 2022-02-08 NOTE — PATIENT INSTRUCTIONS
Patient Education       Chest Pain   About this topic   Chest pain is felt in the upper part of your body from your neck to your belly. You may feel pain, pressure, or tightness. Many things can cause chest pain. Some are serious things like heart disease or lung disease. Less serious things like stomach problems can also cause chest pain.  The doctors may not be able to find all serious causes of chest pain the first time they see you. It is important that you follow up with your doctor. Treatment will depend on what is causing your chest pain.  What are the causes?   Some of the problems related to your heart include:  · Heart attack. This is a blockage of blood supply to part of the heart.  · Angina. This is similar to a heart attack, but without long-lasting heart damage.       · Arrhythmia. This is abnormal heartbeats.  · Pericarditis. This is irritation of the sac around the heart.  Some of the problems that may not be related to your heart include:  · Digestive problems like ulcers, indigestion, gastric reflux, gallstones  · Lung problems like collapsed lung, blood clots, asthma, pneumonia  · Rib injuries or muscle pain  · Panic attack  · Pinched nerve  · Shingles  What are the main signs?   · Chest pain of any type should be checked by a doctor.  · Signs that may show a more serious cause of chest pain are:  ? Squeezing or tightness in the chest which may spread to the back, neck, jaw, shoulders, or arms  ? Shortness of breath  ? Sweating  ? Dizziness  ? Upset stomach, nausea, or throwing up  ? Weakness  ? Feeling faint  · If you have chest pain with any of these signs, call for emergency help.  How does the doctor diagnose this health problem?   Your doctor will do an exam. The doctor may ask you questions about your pain. Your doctor may order:  · Lab tests  · Chest x-ray  · Electrocardiogram (ECG)     · Echocardiogram  · Stress test  · Nuclear heart scanning  · Computed tomography (CT)  · Heart cath or  catheterization  How does the doctor treat this health problem?   Your treatment will depend on what is causing the pain. Many times, drugs may be given to treat the problem. Any chest pain could be serious. More serious problems can be treated by opening the vessel in your heart with a balloon or a metal straw called a stent. You may need surgery to replace the vessels in your heart. Always talk to your doctor about chest pain.  What lifestyle changes are needed?   Once your doctor finds the cause of your chest pain, you may be asked to make changes to your diet, activity, weight, and drugs you take. These changes will depend on the cause of your pain.  What drugs may be needed?   If chest pain is caused by a heart-related problem, the doctor may order drugs to:  · Thin the blood  · Dissolve a blood clot  · Lower cholesterol  · Lessen the work of your heart  · Correct or prevent an abnormal heartbeat  · Lower your blood pressure  · Increase blood flow to the heart muscle  · Relax the heart and help avoid spasms in the arteries  Check with your doctor before you take drugs like ibuprofen, naproxen, vitamins, or hormone replacement therapy.  If chest pain is caused by a something other than your heart, the doctor may order drugs to:  · Help with pain  · Treat stomach problems  · Help with breathing  · Help you relax  · Control coughing  What problems could happen?   If the pain is due to a serious problem with the heart or lungs, the following things could happen:  · Heart attack with long-lasting damage to the heart  · Abnormal heartbeat that is too fast, too slow, or irregular  · The heart stops beating. This is cardiac arrest. If not promptly treated, it can result in death.  What can be done to prevent this health problem?   · If you smoke, stop.  · Keep a healthy weight. If you are too heavy, lose weight.  · Keep blood pressure, cholesterol, and high blood sugar (diabetes) under control.  · Be active. Walk,  garden, or do something active for 30 minutes or more on most days of the week.  · Eat lots of fiber, fruits, starches, and vegetables and stay away from foods that are high in fats.  When do I need to call the doctor?   Activate the emergency medical system right away if you have signs of a heart attack. Call 911 in the [place] or [place]. The sooner treatment begins, the better your chances for recovery. Call for emergency help right away if you have:  · Signs of heart attack:  ? Chest pain  ? Trouble breathing  ? Fast heartbeat  ? Feeling dizzy  · Not had chest pain before and it does not go away with rest after 5 minutes. Do not drive yourself to the hospital or have someone drive you. The emergency rescue people can begin to treat you the minute they arrive.       If you are to take nitroglycerin pills or spray with chest pain:  · Rest. Sit or lie down.  · Spray or place one pill under your tongue and let it melt.  · If the pain is not better or is getting worse after 5 minutes, call for emergency help. Then take one more spray or pill under your tongue.  · If the pain does not get better after another 3 to 5 minutes, take a third spray or pill under your tongue.  · Drink a sip of water to wet your mouth if it is too dry to let the pills break down.  Where can I learn more?   American Heart Association  http://www.heart.org/HEARTORG/Conditions/HeartAttack/AboutHeartAttacks/About-Heart-Attacks_UCM_002038_Article.jsp   American Heart Association  http://www.heart.org/HEARTORG/Conditions/HeartAttack/SymptomsDiagnosisofHeartAttack/Angina-Chest-Pain_UCM_450308_Article.jsp   FamilyDoctor.org  http://familydoctor.org/familydoctor/en/diseases-conditions/angina.printerview.all.html   NHS Choices  https://www.nhs.uk/conditions/chest-pain/   Last Reviewed Date   2021-06-21  Consumer Information Use and Disclaimer   This information is not specific medical advice and does not replace information you receive from your  health care provider. This is only a brief summary of general information. It does NOT include all information about conditions, illnesses, injuries, tests, procedures, treatments, therapies, discharge instructions or life-style choices that may apply to you. You must talk with your health care provider for complete information about your health and treatment options. This information should not be used to decide whether or not to accept your health care providers advice, instructions or recommendations. Only your health care provider has the knowledge and training to provide advice that is right for you.  Copyright   Copyright © 2021 [employer] and its affiliates and/or licensors. All rights reserved.

## 2022-02-08 NOTE — PROGRESS NOTES
Subjective:       Patient ID: Farrah Boston is a 76 y.o. female.    Chief Complaint: Hospital Follow Up, Chest Pain, and Hypertension    Patient is a pleasant 76-year-old  female who comes with follow-up PT and she was recently hospitalized on 01/12/2022 following complains of left-sided arm pain and jaw pain.  She had cardiac investigations in the hospital was Dr. KWAME stewart.  She did have a slightly elevated blood pressure in the emergency room but cardiac enzymes and EKGs did not have any evidence of ischemia.    She had a negative Myoview test.    She was discharged with the addition of Zoloft to her regimen to mitigate some of the issues of anxiety and stress.  Low-fat/low-salt diet was also recommended and follow up with Cardiology was recommended.    In the interim, she did follow up with cardiology physician assistant Sylvie Jurado on 01/28/2022.  It was noted that she had taken Zoloft 1 day and stopped it because of feeling yucky P    Of blood pressures were noted to be stable.  ENT was noted to be slightly elevated at 48.  Plan was to continue Lipitor.  Again low-fat low-cholesterol diet was recommended and she was advised to avoid Tylenol products.      She seems to be realizing that life does not mean running for everybody else but also taking care of herself.  She is learning gradually to say no.  She is taking time out for herself.  She seems to be more at peace with herself at this point.  Mostly she had to take care of her  who is getting crippled with arthritis and also her mother who is getting age and needs more help.  Patient does understand that she needs to help herself fast before she can take care of others.    Tomorrow she plans to celebrate her birthday and it is after 10 years that she will have all her 3 daughters with her and she is looking forward to that special day.    No more chest pains.  Hyperlipidemia  This is a chronic problem. The current episode started more than  1 year ago. The problem is controlled. She has no history of obesity. Pertinent negatives include no chest pain, myalgias or shortness of breath. Current antihyperlipidemic treatment includes statins. The current treatment provides moderate improvement of lipids. There are no compliance problems.  Risk factors for coronary artery disease include dyslipidemia and post-menopausal.   Chest Pain   This is a new problem. The current episode started 1 to 4 weeks ago. The onset quality is sudden. The problem occurs constantly. The problem has been rapidly improving. The pain is present in the substernal region. Associated symptoms include back pain (This is better now.). Pertinent negatives include no abdominal pain, cough, diaphoresis, dizziness, fever, headaches, nausea, palpitations or shortness of breath. She has tried rest for the symptoms. The treatment provided significant relief. Risk factors include sedentary lifestyle and post-menopausal.   Her past medical history is significant for hyperlipidemia.   Pertinent negatives for past medical history include no seizures.   Hypertension  This is a recurrent problem. The current episode started 1 to 4 weeks ago. The problem has been gradually improving since onset. The problem is controlled. Pertinent negatives include no chest pain, headaches, palpitations or shortness of breath. Risk factors for coronary artery disease include sedentary lifestyle and dyslipidemia. Past treatments include beta blockers. The current treatment provides moderate improvement. Compliance problems include psychosocial issues.        Past Medical History:   Diagnosis Date    Allergy     Anxiety     Depression     GERD (gastroesophageal reflux disease)     Need for hepatitis C screening test 2/28/2018    Negative    Osteopenia     Reflux esophagitis     Seasonal allergies      Social History     Socioeconomic History    Marital status:      Spouse name: Mike Boston    Number  of children: 3   Occupational History    Occupation: Journal Contributor/Writer     Comment: [employer]   Tobacco Use    Smoking status: Never Smoker    Smokeless tobacco: Never Used   Substance and Sexual Activity    Alcohol use: Yes     Comment: rarely    Drug use: No    Sexual activity: Yes     Partners: Male     Birth control/protection: None     Past Surgical History:   Procedure Laterality Date    ABDOMINAL ADHESION SURGERY      ANTERIOR VAGINAL REPAIR      BREAST BIOPSY      benign    CHOLECYSTECTOMY      HERNIA REPAIR      HYSTERECTOMY       Family History   Problem Relation Age of Onset    Breast cancer Maternal Aunt 70    Breast cancer Maternal Aunt 80    Hypertension Mother     Cirrhosis Father     Breast cancer Paternal Grandmother 35    Ovarian cancer Neg Hx        Review of Systems   Constitutional: Negative for activity change, appetite change, chills, diaphoresis, fatigue, fever and unexpected weight change (gained 1 lb ).   HENT: Negative for congestion, facial swelling and sore throat.         Left-sided neck pain which suddenly came and went away.  She felt she had a bad sore throat.   Eyes: Negative for photophobia, redness, itching and visual disturbance.   Respiratory: Negative for cough, choking, chest tightness and shortness of breath.    Cardiovascular: Negative for chest pain, palpitations and leg swelling.        White coat hypertension   Gastrointestinal: Negative for abdominal distention, abdominal pain, anal bleeding, diarrhea, nausea and rectal pain.        Few days back she had pelvic pain.   Endocrine: Negative for cold intolerance, polydipsia and polyuria.   Genitourinary: Negative for difficulty urinating, dysuria, flank pain and hematuria.   Musculoskeletal: Positive for back pain (This is better now.). Negative for joint swelling and myalgias.        Bilateral knee pains and pelvic pains.  Suddenly came and suddenly disappeared.   Neurological: Negative  "for dizziness, seizures and headaches.   Psychiatric/Behavioral: Negative for agitation, behavioral problems and confusion. The patient is nervous/anxious.         History patient is known to have a somewhat nervous and anxious disposition.  Zoloft was tried but did not do too well and she did not pursue beyond the 1st dosage.         Objective:      Blood pressure 138/70, pulse 94, height 5' 6" (1.676 m), weight 67.1 kg (148 lb). Body mass index is 23.89 kg/m².  Physical Exam  Constitutional:       General: She is not in acute distress.     Appearance: She is well-developed.   HENT:      Head:      Comments: Face mask  Eyes:      General: No scleral icterus.        Right eye: No discharge.         Left eye: No discharge.   Neck:      Thyroid: No thyromegaly.      Trachea: No tracheal deviation.   Cardiovascular:      Rate and Rhythm: Normal rate and regular rhythm.   Pulmonary:      Effort: Pulmonary effort is normal.      Breath sounds: Normal breath sounds.   Abdominal:      General: Bowel sounds are normal.      Palpations: Abdomen is soft.   Musculoskeletal:         General: No deformity. Normal range of motion.      Lumbar back: No swelling, edema, deformity, tenderness or bony tenderness. Normal range of motion.   Skin:     Coloration: Skin is not pale.      Findings: No erythema or rash.   Neurological:      Mental Status: She is alert.      Motor: No tremor or atrophy.   Psychiatric:         Mood and Affect: Mood is elated.      Comments: Slightly anxious.           Assessment:       1. Intercostal pain    2. Mixed hyperlipidemia    3. Anxiety    4. Elevated ALT measurement           Admission on 01/11/2022, Discharged on 01/12/2022   Component Date Value Ref Range Status    WBC 01/11/2022 5.95  3.90 - 12.70 K/uL Final    RBC 01/11/2022 5.04  4.00 - 5.40 M/uL Final    Hemoglobin 01/11/2022 15.6  12.0 - 16.0 g/dL Final    Hematocrit 01/11/2022 46.8  37.0 - 48.5 % Final    MCV 01/11/2022 93  82 - 98 fL " Final    MCH 01/11/2022 31.0  27.0 - 31.0 pg Final    MCHC 01/11/2022 33.3  32.0 - 36.0 g/dL Final    RDW 01/11/2022 12.4  11.5 - 14.5 % Final    Platelets 01/11/2022 342  150 - 450 K/uL Final    MPV 01/11/2022 9.7  9.2 - 12.9 fL Final    Immature Granulocytes 01/11/2022 0.2  0.0 - 0.5 % Final    Gran # (ANC) 01/11/2022 3.2  1.8 - 7.7 K/uL Final    Immature Grans (Abs) 01/11/2022 0.01  0.00 - 0.04 K/uL Final    Lymph # 01/11/2022 2.1  1.0 - 4.8 K/uL Final    Mono # 01/11/2022 0.5  0.3 - 1.0 K/uL Final    Eos # 01/11/2022 0.1  0.0 - 0.5 K/uL Final    Baso # 01/11/2022 0.03  0.00 - 0.20 K/uL Final    nRBC 01/11/2022 0  0 /100 WBC Final    Gran % 01/11/2022 54.2  38.0 - 73.0 % Final    Lymph % 01/11/2022 36.0  18.0 - 48.0 % Final    Mono % 01/11/2022 8.1  4.0 - 15.0 % Final    Eosinophil % 01/11/2022 1.0  0.0 - 8.0 % Final    Basophil % 01/11/2022 0.5  0.0 - 1.9 % Final    Differential Method 01/11/2022 Automated   Final    Sodium 01/11/2022 138  136 - 145 mmol/L Final    Potassium 01/11/2022 4.1  3.5 - 5.1 mmol/L Final    Chloride 01/11/2022 101  95 - 110 mmol/L Final    CO2 01/11/2022 26  23 - 29 mmol/L Final    Glucose 01/11/2022 114* 70 - 110 mg/dL Final    BUN 01/11/2022 18  8 - 23 mg/dL Final    Creatinine 01/11/2022 0.7  0.5 - 1.4 mg/dL Final    Calcium 01/11/2022 9.8  8.7 - 10.5 mg/dL Final    Total Protein 01/11/2022 8.3  6.0 - 8.4 g/dL Final    Albumin 01/11/2022 5.0  3.5 - 5.2 g/dL Final    Total Bilirubin 01/11/2022 0.7  0.1 - 1.0 mg/dL Final    Alkaline Phosphatase 01/11/2022 97  55 - 135 U/L Final    AST 01/11/2022 31  10 - 40 U/L Final    ALT 01/11/2022 48* 10 - 44 U/L Final    Anion Gap 01/11/2022 11  8 - 16 mmol/L Final    eGFR if African American 01/11/2022 >60.0  >60 mL/min/1.73 m^2 Final    eGFR if non African American 01/11/2022 >60.0  >60 mL/min/1.73 m^2 Final    BNP 01/11/2022 20  0 - 99 pg/mL Final    Troponin I 01/11/2022 <0.030  <=0.040 ng/mL Final    PT  01/11/2022 12.5  11.4 - 13.7 sec Final    INR 01/11/2022 1.0   Final    SARS-CoV-2 RNA, Amplification, Qual 01/11/2022 Negative  Negative Final    Troponin I 01/11/2022 <0.030  <=0.040 ng/mL Final    85% Max Predicted HR 01/12/2022 120   Final    Max Predicted HR 01/12/2022 141   Final    OHS CV CPX PATIENT IS MALE 01/12/2022 0.0   Final    OHS CV CPX PATIENT IS FEMALE 01/12/2022 1.0   Final    HR at rest 01/12/2022 78  bpm Final    Systolic blood pressure 01/12/2022 164  mmHg Final    Diastolic blood pressure 01/12/2022 93  mmHg Final    RPP 01/12/2022 12,792   Final    Exercise duration (min) 01/12/2022 8  minutes Final    Peak HR 01/12/2022 119  bpm Final    Peak Systolic BP 01/12/2022 206  mmHg Final    Peak Diatolic BP 01/12/2022 81  mmHg Final    Peak RPP 01/12/2022 24,514   Final    Estimated METs 01/12/2022 7   Final    % Max HR Achieved 01/12/2022 84   Final    1 Minute Recovery HR 01/12/2022 102  bpm Final    Hemoglobin A1C 01/11/2022 5.5  4.5 - 6.2 % Final    Estimated Avg Glucose 01/11/2022 111  68 - 131 mg/dL Final    Cholesterol 01/11/2022 163  120 - 199 mg/dL Final    Triglycerides 01/11/2022 172* 30 - 150 mg/dL Final    HDL 01/11/2022 45  40 - 75 mg/dL Final    LDL Cholesterol 01/11/2022 83.6  63.0 - 159.0 mg/dL Final    HDL/Cholesterol Ratio 01/11/2022 27.6  20.0 - 50.0 % Final    Total Cholesterol/HDL Ratio 01/11/2022 3.6  2.0 - 5.0 Final    Non-HDL Cholesterol 01/11/2022 118  mg/dL Final    Troponin I 01/11/2022 <0.030  <=0.040 ng/mL Final     COMPARISON:  Radiograph from January 11, 2022     FINDINGS:  There is normal and homogeneous radiotracer uptake by the left ventricular myocardium, with no photopenic defects to suggest stress induced reversible ischemia or infarct.     There are no wall motion abnormalities on the gated cine images and no abnormal transient left ventricular dilatation on stress images.     Ejection fraction is calculated at 88 %.  The polar map  images confirm the planar SPECT findings.     IMPRESSION:  1. No evidence of stress-induced reversible ischemia or infarct.  2. Normal left ventricular wall motion.  3. Calculated ejection fraction of 88 %.       Plan:           Intercostal pain  Comments:  Patient had xiphisternal deep pain.  Looks noncardiac.  She had her gallbladder removed in past.  Cardiac workup in hospital was negative.    Mixed hyperlipidemia  Comments:  She is currently taking Lipitor 10 mg.  Seems to be tolerating well.  Liver enzyme is somewhat elevated.  Will check again in 2-3 months time.    Anxiety  Comments:  Initially she was somewhat anxious because of multiple issues.  She was started on Zoloft which did not do too good on her.  Currently she is off medications an    Elevated ALT measurement  Comments:  Her ALT level was 48. Will repeated again.  Please do not mix too much alcohol with medications.      Patient's recent hospitalization for noncardiac chest pain has been noted.  This was accompanied with some degree of anxiety and elevated blood pressures also.  All cardiac workup was done she was discharged in a stable condition.  She did have a Cardiology follow-up.  She did not continue with the Zoloft because of under was feeling.    Patient has been advised to continue with efforts at diet, exercise probably some degree of medication but again the effect of anxiety.    Her liver enzyme ALT was found to be somewhat elevated in the hospital.  It was 48.  Will repeated again in a month or 2.    Otherwise she is doing fairly well at this point.    I have advised her to continue to watch her diet and incorporate more greens and vegetables and less of fried and fatty food.    I have also urged her to consider taking immunizations like flu vaccine and shingles vaccine down the road.  She is updated on the COVID vaccines with booster also.  Unfortunately after completing the COVID vaccine she still get the COVID infection.    Social  determinants also have been reviewed and updated today.  Issues of stress have been noted.  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    Follow-up in 3 months.  She will be following up with Dr. Roca nurse practitioner also- Sylvie Jurado or Laney Sams    Spent brittany 30 minutes with patient which involved review of pts medical conditions, labs, medications and with 50% of time face-to-face discussion about medical problems, management and any applicable changes.        Current Outpatient Medications:     aspirin 81 MG Chew, Take 1 tablet (81 mg total) by mouth once daily., Disp: , Rfl: 0    atorvastatin (LIPITOR) 10 MG tablet, TAKE 1 TABLET EVERY DAY, Disp: 90 tablet, Rfl: 3    calcium-vitamin D3 (OS-LUIS 500 + D3) 500 mg(1,250mg) -200 unit per tablet, Take 1 tablet by mouth 2 (two) times daily with meals., Disp: , Rfl:     coQ10, ubiquinol, 200 mg Cap, Take 1 tablet by mouth Daily., Disp: , Rfl:     magnesium 200 mg Tab, Take 400 mg by mouth once., Disp: , Rfl:     metoprolol succinate (TOPROL-XL) 25 MG 24 hr tablet, TAKE 1 TABLET (25 MG TOTAL) BY MOUTH ONCE DAILY., Disp: 90 tablet, Rfl: 3    multivit-min/iron/folic/lutein (CENTRUM SILVER WOMEN ORAL), Take 1 tablet by mouth Daily., Disp: , Rfl:     polyethylene glycol 3350 (MIRALAX ORAL), Take by mouth. Take 3/4 capful by mouth nightly as needed, Disp: , Rfl:     pulse oximeter (PULSE OXIMETER) device, by Apply Externally route 2 (two) times a day. Use twice daily at 8 AM and 3 PM and record the value in MyChart as directed., Disp: 1 each, Rfl: 0  No current facility-administered medications for this visit.

## 2022-03-28 ENCOUNTER — PATIENT MESSAGE (OUTPATIENT)
Dept: CARDIOLOGY | Facility: CLINIC | Age: 77
End: 2022-03-28
Payer: MEDICARE

## 2022-04-22 ENCOUNTER — TELEPHONE (OUTPATIENT)
Dept: CARDIOLOGY | Facility: CLINIC | Age: 77
End: 2022-04-22
Payer: MEDICARE

## 2022-04-25 ENCOUNTER — LAB VISIT (OUTPATIENT)
Dept: LAB | Facility: HOSPITAL | Age: 77
End: 2022-04-25
Payer: MEDICARE

## 2022-04-25 DIAGNOSIS — R79.89 ELEVATED LFTS: ICD-10-CM

## 2022-04-25 DIAGNOSIS — E78.2 ELEVATED TRIGLYCERIDES WITH HIGH CHOLESTEROL: ICD-10-CM

## 2022-04-25 DIAGNOSIS — E78.5 DYSLIPIDEMIA: ICD-10-CM

## 2022-04-25 LAB
ALBUMIN SERPL BCP-MCNC: 4.3 G/DL (ref 3.5–5.2)
ALP SERPL-CCNC: 86 U/L (ref 55–135)
ALT SERPL W/O P-5'-P-CCNC: 33 U/L (ref 10–44)
AST SERPL-CCNC: 23 U/L (ref 10–40)
BILIRUB DIRECT SERPL-MCNC: <0.1 MG/DL (ref 0.1–0.3)
BILIRUB SERPL-MCNC: 0.7 MG/DL (ref 0.1–1)
CHOLEST SERPL-MCNC: 149 MG/DL (ref 120–199)
CHOLEST/HDLC SERPL: 3.5 {RATIO} (ref 2–5)
HDLC SERPL-MCNC: 43 MG/DL (ref 40–75)
HDLC SERPL: 28.9 % (ref 20–50)
LDLC SERPL CALC-MCNC: 84.4 MG/DL (ref 63–159)
NONHDLC SERPL-MCNC: 106 MG/DL
PROT SERPL-MCNC: 7.1 G/DL (ref 6–8.4)
TRIGL SERPL-MCNC: 108 MG/DL (ref 30–150)

## 2022-04-25 PROCEDURE — 36415 COLL VENOUS BLD VENIPUNCTURE: CPT

## 2022-04-25 PROCEDURE — 80076 HEPATIC FUNCTION PANEL: CPT

## 2022-04-25 PROCEDURE — 80061 LIPID PANEL: CPT

## 2022-04-26 ENCOUNTER — TELEPHONE (OUTPATIENT)
Dept: CARDIOLOGY | Facility: CLINIC | Age: 77
End: 2022-04-26
Payer: MEDICARE

## 2022-04-26 NOTE — TELEPHONE ENCOUNTER
----- Message from Riddhi Barrett sent at 4/26/2022  4:56 PM CDT -----  Contact: pt  Type: Needs Medical Advice    Who Called: pt  Best Call Back Number: [phone]  Inquiry/Question: pt called to r/s her appt that the nurse had called about Thank you~

## 2022-05-08 NOTE — PROGRESS NOTES
Dictation #1  MRN:[id number]  [facility]:[id number]  Subjective:       Patient ID: Farrah Boston is a 77 y.o. female.    Chief Complaint: Hypertension, Hyperlipidemia, and Mild depression    Patient is a 77-year-old  female who comes for follow-up.    Underlying medical issues include the following:-    1.-dyslipidemia currently on atorvastatin 10 mg.  2.-mild hypertension currently on metoprolol.  Possibly also for palpitations.  3.-gastroesophageal reflux  4.-degenerative disc disease of lumbar sacral region.  5.-mixed anxiety and depressive disorder  6.-seasonal allergic rhinitis    Prior hospitalization for sharp intercostal pain which turned out to be noncardiac was noted.  As a part of continuum of risk reduction she was advised to continue the Lipitor.    She continues on aspirin though with current guidelines the risk versus benefit needs to be assessed for aspirin.    She had the last cardiology follow-up and January.    Reflux symptoms are stable.    Preventive care issues including the following have been reviewed    1.-shingles vaccine  2. Ophthalmology examination  3. Booster dose #2  for COVID vaccine.      Past Medical History:   Diagnosis Date    Allergy     Anxiety     Depression     GERD (gastroesophageal reflux disease)     Need for hepatitis C screening test 2/28/2018    Negative    Osteopenia     Reflux esophagitis     Seasonal allergies      Social History     Socioeconomic History    Marital status:      Spouse name: Mike Boston    Number of children: 3   Occupational History    Occupation: Journal Contributor/Writer     Comment: [employer]   Tobacco Use    Smoking status: Never Smoker    Smokeless tobacco: Never Used   Substance and Sexual Activity    Alcohol use: Yes     Comment: rarely    Drug use: No    Sexual activity: Yes     Partners: Male     Birth control/protection: None     Social Determinants of Health     Financial Resource Strain: Low Risk      Difficulty of Paying Living Expenses: Not very hard   Food Insecurity: No Food Insecurity    Worried About Running Out of Food in the Last Year: Never true    Ran Out of Food in the Last Year: Never true   Transportation Needs: No Transportation Needs    Lack of Transportation (Medical): No    Lack of Transportation (Non-Medical): No   Physical Activity: Sufficiently Active    Days of Exercise per Week: 6 days    Minutes of Exercise per Session: 30 min   Stress: Stress Concern Present    Feeling of Stress : To some extent   Social Connections: Socially Integrated    Frequency of Communication with Friends and Family: More than three times a week    Frequency of Social Gatherings with Friends and Family: More than three times a week    Attends Yazdanism Services: More than 4 times per year    Active Member of Clubs or Organizations: Yes    Attends Club or Organization Meetings: 1 to 4 times per year    Marital Status:    Housing Stability: Low Risk     Unable to Pay for Housing in the Last Year: No    Number of Places Lived in the Last Year: 1    Unstable Housing in the Last Year: No     Past Surgical History:   Procedure Laterality Date    ABDOMINAL ADHESION SURGERY      ANTERIOR VAGINAL REPAIR      BREAST BIOPSY      benign    CHOLECYSTECTOMY      HERNIA REPAIR      HYSTERECTOMY       Family History   Problem Relation Age of Onset    Breast cancer Maternal Aunt 70    Breast cancer Maternal Aunt 80    Hypertension Mother     Cirrhosis Father     Breast cancer Paternal Grandmother 35    Ovarian cancer Neg Hx        Review of Systems   Constitutional: Negative for activity change, appetite change, chills, diaphoresis, fatigue, fever and unexpected weight change (gained 1 lb ).   HENT: Negative for congestion, facial swelling and sore throat.         Left-sided neck pain which suddenly came and went away.  She felt she had a bad sore throat.   Eyes: Negative for photophobia, redness,  "itching and visual disturbance.   Respiratory: Negative for cough, choking, chest tightness and shortness of breath.    Cardiovascular: Negative for chest pain, palpitations and leg swelling.        White coat hypertension   Gastrointestinal: Negative for abdominal distention, abdominal pain, anal bleeding, diarrhea, nausea and rectal pain.        Few days back she had pelvic pain.   Endocrine: Negative for cold intolerance, polydipsia and polyuria.   Genitourinary: Negative for difficulty urinating, dysuria, flank pain and hematuria.   Musculoskeletal: Positive for back pain (This is better now.). Negative for joint swelling and myalgias.        Bilateral knee pains and pelvic pains.  Suddenly came and suddenly disappeared.   Neurological: Negative for dizziness, seizures and headaches.   Psychiatric/Behavioral: Negative for agitation, behavioral problems and confusion. The patient is nervous/anxious.         History patient is known to have a somewhat nervous and anxious disposition.  Zoloft was tried but did not do too well and she did not pursue beyond the 1st dosage.         Objective:      Blood pressure 126/71, pulse 71, height 5' 6" (1.676 m), weight 66.2 kg (146 lb). Body mass index is 23.57 kg/m².  Physical Exam  Constitutional:       General: She is not in acute distress.     Appearance: She is well-developed.   HENT:      Head:      Comments: Face mask  Eyes:      General: No scleral icterus.        Right eye: No discharge.         Left eye: No discharge.   Neck:      Thyroid: No thyromegaly.      Trachea: No tracheal deviation.   Cardiovascular:      Rate and Rhythm: Normal rate and regular rhythm.   Pulmonary:      Effort: Pulmonary effort is normal.      Breath sounds: Normal breath sounds.   Abdominal:      General: Bowel sounds are normal.      Palpations: Abdomen is soft.   Musculoskeletal:         General: No deformity. Normal range of motion.      Lumbar back: No swelling, edema, deformity, " tenderness or bony tenderness. Normal range of motion.   Skin:     Coloration: Skin is not pale.      Findings: No erythema or rash.   Neurological:      Mental Status: She is alert.      Motor: No tremor or atrophy.   Psychiatric:         Mood and Affect: Mood is elated.      Comments: Slightly anxious.           Assessment:       1. Mixed anxiety depressive disorder    2. Gastroesophageal reflux disease without esophagitis    3. Mixed hyperlipidemia    4. Need for shingles vaccine           Lab Visit on 04/25/2022   Component Date Value Ref Range Status    Cholesterol 04/25/2022 149  120 - 199 mg/dL Final    Triglycerides 04/25/2022 108  30 - 150 mg/dL Final    HDL 04/25/2022 43  40 - 75 mg/dL Final    LDL Cholesterol 04/25/2022 84.4  63.0 - 159.0 mg/dL Final    HDL/Cholesterol Ratio 04/25/2022 28.9  20.0 - 50.0 % Final    Total Cholesterol/HDL Ratio 04/25/2022 3.5  2.0 - 5.0 Final    Non-HDL Cholesterol 04/25/2022 106  mg/dL Final    Total Protein 04/25/2022 7.1  6.0 - 8.4 g/dL Final    Albumin 04/25/2022 4.3  3.5 - 5.2 g/dL Final    Total Bilirubin 04/25/2022 0.7  0.1 - 1.0 mg/dL Final    Bilirubin, Direct 04/25/2022 <0.1 (A) 0.1 - 0.3 mg/dL Final    AST 04/25/2022 23  10 - 40 U/L Final    ALT 04/25/2022 33  10 - 44 U/L Final    Alkaline Phosphatase 04/25/2022 86  55 - 135 U/L Final         Plan:           Mixed anxiety depressive disorder    Gastroesophageal reflux disease without esophagitis    Mixed hyperlipidemia  -     Basic Metabolic Panel; Future; Expected date: 05/10/2022  -     TSH; Future; Expected date: 05/10/2022    Need for shingles vaccine  -     varicella-zoster gE-AS01B, PF, (SHINGRIX, PF,) 50 mcg/0.5 mL injection; Inject 0.5 mLs into the muscle every 6 (six) months. First vaccination as baseline and repeat booster dosage within 2-6 months.  Dispense: 1 each; Refill: 1      Preventive care issues have been reviewed.    Anxiety and depressive order seems to be stable.    For mixed  dyslipidemia she is taking Lipitor a new labs will be checked at next visit.    Reflux symptoms are stable.    Preventive care issues have been reviewed for shingles vaccine.  She had the old zoster vaccine in 2014 any new prescription will be given to her to be taken at local pharmacy if she is agreeable.    Ophthalmology examination has been recommended.  She already had the ophthalmology examination done.    Issues of self preservation, happiness, coping with stressful and difficult situations in life discussed.  Last visit we had reviewed that she had difficulty saying no to different types interests and interactions at the cost and detriment to herself.  Gradually she is learning to get accustomed to be mindful about herself encourage and Grit to say no.  She is learning to be more assertive for herself rather than be passive or passive-aggressive.    Discussed about shingles vaccine and a prescription has been given.  She did have shingles few years back and she thought she should not get the shingles vaccine.  I have advised her that she should consider taking it.    Patient is interested in knowing her thyroid.  Last year it was okay.  Will check 1 again this year and see.  Last year it was on the borderline.  She is worried about losing hair.  There seems to be thyroid issues family members.    Follow-up in 6 months or earlier as needed.    Continue with COVID precautions.      Current Outpatient Medications:     aspirin 81 MG Chew, Take 1 tablet (81 mg total) by mouth once daily., Disp: , Rfl: 0    atorvastatin (LIPITOR) 10 MG tablet, TAKE 1 TABLET EVERY DAY, Disp: 90 tablet, Rfl: 3    calcium-vitamin D3 (OS-LUIS 500 + D3) 500 mg(1,250mg) -200 unit per tablet, Take 1 tablet by mouth 2 (two) times daily with meals., Disp: , Rfl:     coQ10, ubiquinol, 200 mg Cap, Take 1 tablet by mouth Daily., Disp: , Rfl:     magnesium 200 mg Tab, Take 400 mg by mouth once., Disp: , Rfl:     metoprolol succinate  (TOPROL-XL) 25 MG 24 hr tablet, TAKE 1 TABLET (25 MG TOTAL) BY MOUTH ONCE DAILY., Disp: 90 tablet, Rfl: 3    multivit-min/iron/folic/lutein (CENTRUM SILVER WOMEN ORAL), Take 1 tablet by mouth Daily., Disp: , Rfl:     polyethylene glycol 3350 (MIRALAX ORAL), Take by mouth. Take 3/4 capful by mouth nightly as needed, Disp: , Rfl:     varicella-zoster gE-AS01B, PF, (SHINGRIX, PF,) 50 mcg/0.5 mL injection, Inject 0.5 mLs into the muscle every 6 (six) months. First vaccination as baseline and repeat booster dosage within 2-6 months., Disp: 1 each, Rfl: 1

## 2022-05-10 ENCOUNTER — OFFICE VISIT (OUTPATIENT)
Dept: FAMILY MEDICINE | Facility: CLINIC | Age: 77
End: 2022-05-10
Payer: MEDICARE

## 2022-05-10 VITALS
BODY MASS INDEX: 23.46 KG/M2 | WEIGHT: 146 LBS | HEIGHT: 66 IN | DIASTOLIC BLOOD PRESSURE: 71 MMHG | SYSTOLIC BLOOD PRESSURE: 126 MMHG | HEART RATE: 71 BPM

## 2022-05-10 DIAGNOSIS — E78.2 MIXED HYPERLIPIDEMIA: ICD-10-CM

## 2022-05-10 DIAGNOSIS — K21.9 GASTROESOPHAGEAL REFLUX DISEASE WITHOUT ESOPHAGITIS: ICD-10-CM

## 2022-05-10 DIAGNOSIS — Z23 NEED FOR SHINGLES VACCINE: ICD-10-CM

## 2022-05-10 DIAGNOSIS — F41.8 MIXED ANXIETY DEPRESSIVE DISORDER: Primary | ICD-10-CM

## 2022-05-10 PROCEDURE — 3044F HG A1C LEVEL LT 7.0%: CPT | Mod: CPTII,S$GLB,, | Performed by: INTERNAL MEDICINE

## 2022-05-10 PROCEDURE — 3074F PR MOST RECENT SYSTOLIC BLOOD PRESSURE < 130 MM HG: ICD-10-PCS | Mod: CPTII,S$GLB,, | Performed by: INTERNAL MEDICINE

## 2022-05-10 PROCEDURE — 1126F PR PAIN SEVERITY QUANTIFIED, NO PAIN PRESENT: ICD-10-PCS | Mod: CPTII,S$GLB,, | Performed by: INTERNAL MEDICINE

## 2022-05-10 PROCEDURE — 1159F MED LIST DOCD IN RCRD: CPT | Mod: CPTII,S$GLB,, | Performed by: INTERNAL MEDICINE

## 2022-05-10 PROCEDURE — 3078F PR MOST RECENT DIASTOLIC BLOOD PRESSURE < 80 MM HG: ICD-10-PCS | Mod: CPTII,S$GLB,, | Performed by: INTERNAL MEDICINE

## 2022-05-10 PROCEDURE — 3288F PR FALLS RISK ASSESSMENT DOCUMENTED: ICD-10-PCS | Mod: CPTII,S$GLB,, | Performed by: INTERNAL MEDICINE

## 2022-05-10 PROCEDURE — 99213 OFFICE O/P EST LOW 20 MIN: CPT | Mod: S$GLB,,, | Performed by: INTERNAL MEDICINE

## 2022-05-10 PROCEDURE — 1126F AMNT PAIN NOTED NONE PRSNT: CPT | Mod: CPTII,S$GLB,, | Performed by: INTERNAL MEDICINE

## 2022-05-10 PROCEDURE — 1159F PR MEDICATION LIST DOCUMENTED IN MEDICAL RECORD: ICD-10-PCS | Mod: CPTII,S$GLB,, | Performed by: INTERNAL MEDICINE

## 2022-05-10 PROCEDURE — 1160F RVW MEDS BY RX/DR IN RCRD: CPT | Mod: CPTII,S$GLB,, | Performed by: INTERNAL MEDICINE

## 2022-05-10 PROCEDURE — 3044F PR MOST RECENT HEMOGLOBIN A1C LEVEL <7.0%: ICD-10-PCS | Mod: CPTII,S$GLB,, | Performed by: INTERNAL MEDICINE

## 2022-05-10 PROCEDURE — 3078F DIAST BP <80 MM HG: CPT | Mod: CPTII,S$GLB,, | Performed by: INTERNAL MEDICINE

## 2022-05-10 PROCEDURE — 1101F PT FALLS ASSESS-DOCD LE1/YR: CPT | Mod: CPTII,S$GLB,, | Performed by: INTERNAL MEDICINE

## 2022-05-10 PROCEDURE — 3288F FALL RISK ASSESSMENT DOCD: CPT | Mod: CPTII,S$GLB,, | Performed by: INTERNAL MEDICINE

## 2022-05-10 PROCEDURE — 1160F PR REVIEW ALL MEDS BY PRESCRIBER/CLIN PHARMACIST DOCUMENTED: ICD-10-PCS | Mod: CPTII,S$GLB,, | Performed by: INTERNAL MEDICINE

## 2022-05-10 PROCEDURE — 99213 PR OFFICE/OUTPT VISIT, EST, LEVL III, 20-29 MIN: ICD-10-PCS | Mod: S$GLB,,, | Performed by: INTERNAL MEDICINE

## 2022-05-10 PROCEDURE — 3074F SYST BP LT 130 MM HG: CPT | Mod: CPTII,S$GLB,, | Performed by: INTERNAL MEDICINE

## 2022-05-10 PROCEDURE — 1101F PR PT FALLS ASSESS DOC 0-1 FALLS W/OUT INJ PAST YR: ICD-10-PCS | Mod: CPTII,S$GLB,, | Performed by: INTERNAL MEDICINE

## 2022-05-10 RX ORDER — ZOSTER VACCINE RECOMBINANT, ADJUVANTED 50 MCG/0.5
0.5 KIT INTRAMUSCULAR
Qty: 1 EACH | Refills: 1 | Status: SHIPPED | OUTPATIENT
Start: 2022-05-10 | End: 2023-05-02

## 2022-06-13 ENCOUNTER — PATIENT MESSAGE (OUTPATIENT)
Dept: CARDIOLOGY | Facility: CLINIC | Age: 77
End: 2022-06-13
Payer: MEDICARE

## 2022-06-14 ENCOUNTER — TELEPHONE (OUTPATIENT)
Dept: OBSTETRICS AND GYNECOLOGY | Facility: CLINIC | Age: 77
End: 2022-06-14
Payer: MEDICARE

## 2022-06-14 DIAGNOSIS — Z12.31 SCREENING MAMMOGRAM, ENCOUNTER FOR: Primary | ICD-10-CM

## 2022-07-23 ENCOUNTER — PATIENT MESSAGE (OUTPATIENT)
Dept: OBSTETRICS AND GYNECOLOGY | Facility: CLINIC | Age: 77
End: 2022-07-23
Payer: MEDICARE

## 2022-08-03 ENCOUNTER — OFFICE VISIT (OUTPATIENT)
Dept: OBSTETRICS AND GYNECOLOGY | Facility: CLINIC | Age: 77
End: 2022-08-03
Payer: MEDICARE

## 2022-08-03 ENCOUNTER — HOSPITAL ENCOUNTER (OUTPATIENT)
Dept: RADIOLOGY | Facility: HOSPITAL | Age: 77
Discharge: HOME | End: 2022-08-03
Attending: OBSTETRICS & GYNECOLOGY
Payer: MEDICARE

## 2022-08-03 VITALS
SYSTOLIC BLOOD PRESSURE: 138 MMHG | BODY MASS INDEX: 23.06 KG/M2 | WEIGHT: 142.88 LBS | DIASTOLIC BLOOD PRESSURE: 82 MMHG

## 2022-08-03 DIAGNOSIS — Z12.31 SCREENING MAMMOGRAM, ENCOUNTER FOR: ICD-10-CM

## 2022-08-03 DIAGNOSIS — Z01.419 WOMEN'S ANNUAL ROUTINE GYNECOLOGICAL EXAMINATION: Primary | ICD-10-CM

## 2022-08-03 PROCEDURE — G0101 CA SCREEN;PELVIC/BREAST EXAM: HCPCS | Mod: S$GLB,,, | Performed by: OBSTETRICS & GYNECOLOGY

## 2022-08-03 PROCEDURE — 77067 SCR MAMMO BI INCL CAD: CPT | Mod: TC,PN

## 2022-08-03 PROCEDURE — 99999 PR PBB SHADOW E&M-EST. PATIENT-LVL III: ICD-10-PCS | Mod: PBBFAC,,, | Performed by: OBSTETRICS & GYNECOLOGY

## 2022-08-03 PROCEDURE — 77063 MAMMO DIGITAL SCREENING BILAT WITH TOMO: ICD-10-PCS | Mod: 26,,, | Performed by: RADIOLOGY

## 2022-08-03 PROCEDURE — 1159F MED LIST DOCD IN RCRD: CPT | Mod: CPTII,S$GLB,, | Performed by: OBSTETRICS & GYNECOLOGY

## 2022-08-03 PROCEDURE — G0101 PR CA SCREEN;PELVIC/BREAST EXAM: ICD-10-PCS | Mod: S$GLB,,, | Performed by: OBSTETRICS & GYNECOLOGY

## 2022-08-03 PROCEDURE — 77063 BREAST TOMOSYNTHESIS BI: CPT | Mod: TC,PN

## 2022-08-03 PROCEDURE — 3288F PR FALLS RISK ASSESSMENT DOCUMENTED: ICD-10-PCS | Mod: CPTII,S$GLB,, | Performed by: OBSTETRICS & GYNECOLOGY

## 2022-08-03 PROCEDURE — 3075F PR MOST RECENT SYSTOLIC BLOOD PRESS GE 130-139MM HG: ICD-10-PCS | Mod: CPTII,S$GLB,, | Performed by: OBSTETRICS & GYNECOLOGY

## 2022-08-03 PROCEDURE — 1126F PR PAIN SEVERITY QUANTIFIED, NO PAIN PRESENT: ICD-10-PCS | Mod: CPTII,S$GLB,, | Performed by: OBSTETRICS & GYNECOLOGY

## 2022-08-03 PROCEDURE — 77067 SCR MAMMO BI INCL CAD: CPT | Mod: 26,,, | Performed by: RADIOLOGY

## 2022-08-03 PROCEDURE — 77063 BREAST TOMOSYNTHESIS BI: CPT | Mod: 26,,, | Performed by: RADIOLOGY

## 2022-08-03 PROCEDURE — 1126F AMNT PAIN NOTED NONE PRSNT: CPT | Mod: CPTII,S$GLB,, | Performed by: OBSTETRICS & GYNECOLOGY

## 2022-08-03 PROCEDURE — 1101F PT FALLS ASSESS-DOCD LE1/YR: CPT | Mod: CPTII,S$GLB,, | Performed by: OBSTETRICS & GYNECOLOGY

## 2022-08-03 PROCEDURE — 1101F PR PT FALLS ASSESS DOC 0-1 FALLS W/OUT INJ PAST YR: ICD-10-PCS | Mod: CPTII,S$GLB,, | Performed by: OBSTETRICS & GYNECOLOGY

## 2022-08-03 PROCEDURE — 99999 PR PBB SHADOW E&M-EST. PATIENT-LVL III: CPT | Mod: PBBFAC,,, | Performed by: OBSTETRICS & GYNECOLOGY

## 2022-08-03 PROCEDURE — 3288F FALL RISK ASSESSMENT DOCD: CPT | Mod: CPTII,S$GLB,, | Performed by: OBSTETRICS & GYNECOLOGY

## 2022-08-03 PROCEDURE — 3044F HG A1C LEVEL LT 7.0%: CPT | Mod: CPTII,S$GLB,, | Performed by: OBSTETRICS & GYNECOLOGY

## 2022-08-03 PROCEDURE — 3075F SYST BP GE 130 - 139MM HG: CPT | Mod: CPTII,S$GLB,, | Performed by: OBSTETRICS & GYNECOLOGY

## 2022-08-03 PROCEDURE — 1159F PR MEDICATION LIST DOCUMENTED IN MEDICAL RECORD: ICD-10-PCS | Mod: CPTII,S$GLB,, | Performed by: OBSTETRICS & GYNECOLOGY

## 2022-08-03 PROCEDURE — 77067 MAMMO DIGITAL SCREENING BILAT WITH TOMO: ICD-10-PCS | Mod: 26,,, | Performed by: RADIOLOGY

## 2022-08-03 PROCEDURE — 3079F PR MOST RECENT DIASTOLIC BLOOD PRESSURE 80-89 MM HG: ICD-10-PCS | Mod: CPTII,S$GLB,, | Performed by: OBSTETRICS & GYNECOLOGY

## 2022-08-03 PROCEDURE — 3044F PR MOST RECENT HEMOGLOBIN A1C LEVEL <7.0%: ICD-10-PCS | Mod: CPTII,S$GLB,, | Performed by: OBSTETRICS & GYNECOLOGY

## 2022-08-03 PROCEDURE — 3079F DIAST BP 80-89 MM HG: CPT | Mod: CPTII,S$GLB,, | Performed by: OBSTETRICS & GYNECOLOGY

## 2022-08-03 NOTE — PROGRESS NOTES
"Chief Complaint   Patient presents with    Well Woman    Annual Exam       History and Physical:  No LMP recorded (lmp unknown). Patient has had a hysterectomy.       Farrah Boston is a 77 y.o.  female who presents today for her routine annual GYN exam. The patient has no Gynecology complaints today. No bowel or bladder complaints.       Allergies:   Review of patient's allergies indicates:   Allergen Reactions    Morphine Shortness Of Breath     Patient states her" breathing stops"    Codeine Nausea And Vomiting       Past Medical History:   Diagnosis Date    Allergy     Anxiety     Depression     GERD (gastroesophageal reflux disease)     Need for hepatitis C screening test 2/28/2018    Negative    Osteopenia     Reflux esophagitis     Seasonal allergies        Past Surgical History:   Procedure Laterality Date    ABDOMINAL ADHESION SURGERY      ANTERIOR VAGINAL REPAIR      BREAST BIOPSY      benign    CHOLECYSTECTOMY      HERNIA REPAIR      HYSTERECTOMY         MEDS:   Current Outpatient Medications on File Prior to Visit   Medication Sig Dispense Refill    aspirin 81 MG Chew Take 1 tablet (81 mg total) by mouth once daily.  0    atorvastatin (LIPITOR) 10 MG tablet TAKE 1 TABLET EVERY DAY 90 tablet 3    calcium-vitamin D3 (OS-LUIS 500 + D3) 500 mg(1,250mg) -200 unit per tablet Take 1 tablet by mouth 2 (two) times daily with meals.      coQ10, ubiquinol, 200 mg Cap Take 1 tablet by mouth Daily.      magnesium 200 mg Tab Take 400 mg by mouth once.      metoprolol succinate (TOPROL-XL) 25 MG 24 hr tablet TAKE 1 TABLET (25 MG TOTAL) BY MOUTH ONCE DAILY. 90 tablet 3    multivit-min/iron/folic/lutein (CENTRUM SILVER WOMEN ORAL) Take 1 tablet by mouth Daily.      polyethylene glycol 3350 (MIRALAX ORAL) Take by mouth. Take 3/4 capful by mouth nightly as needed      varicella-zoster gE-AS01B, PF, (SHINGRIX, PF,) 50 mcg/0.5 mL injection Inject 0.5 mLs into the muscle every 6 (six) months. " First vaccination as baseline and repeat booster dosage within 2-6 months. 1 each 1     No current facility-administered medications on file prior to visit.       OB History        7    Para   6    Term   3            AB   1    Living           SAB   1    IAB        Ectopic        Multiple        Live Births                     Social History     Socioeconomic History    Marital status:      Spouse name: iMke Boston    Number of children: 3   Occupational History    Occupation: Journal Contributor/Writer     Comment: [employer]   Tobacco Use    Smoking status: Never Smoker    Smokeless tobacco: Never Used   Substance and Sexual Activity    Alcohol use: Yes     Comment: rarely    Drug use: No    Sexual activity: Yes     Partners: Male     Birth control/protection: None     Social Determinants of Health     Financial Resource Strain: Low Risk     Difficulty of Paying Living Expenses: Not very hard   Food Insecurity: No Food Insecurity    Worried About Running Out of Food in the Last Year: Never true    Ran Out of Food in the Last Year: Never true   Transportation Needs: No Transportation Needs    Lack of Transportation (Medical): No    Lack of Transportation (Non-Medical): No   Physical Activity: Sufficiently Active    Days of Exercise per Week: 6 days    Minutes of Exercise per Session: 30 min   Stress: Stress Concern Present    Feeling of Stress : To some extent   Social Connections: Socially Integrated    Frequency of Communication with Friends and Family: More than three times a week    Frequency of Social Gatherings with Friends and Family: More than three times a week    Attends Sikhism Services: More than 4 times per year    Active Member of Clubs or Organizations: Yes    Attends Club or Organization Meetings: 1 to 4 times per year    Marital Status:    Housing Stability: Low Risk     Unable to Pay for Housing in the Last Year: No    Number of Places  Lived in the Last Year: 1    Unstable Housing in the Last Year: No       Family History   Problem Relation Age of Onset    Breast cancer Maternal Aunt 70    Breast cancer Maternal Aunt 80    Hypertension Mother     Cirrhosis Father     Breast cancer Paternal Grandmother 35    Ovarian cancer Neg Hx          Past medical and surgical history reviewed.   I have reviewed the patient's medical history in detail and updated the computerized patient record.        Review of System:   General: no chills, fever, night sweats, weight gain or weight loss  Psychological: no depression or suicidal ideation  Breasts: no new or changing breast lumps, nipple discharge or masses.  Respiratory: no cough, shortness of breath, or wheezing  Cardiovascular: no chest pain or dyspnea on exertion  Gastrointestinal: no abdominal pain, change in bowel habits, or black or bloody stools  Genito-Urinary: no incontinence, urinary frequency/urgency or vulvar/vaginal symptoms, pelvic pain or abnormal vaginal bleeding.  Musculoskeletal: no gait disturbance or muscular weakness      Physical Exam:   /82   Wt 64.8 kg (142 lb 13.7 oz)   LMP  (LMP Unknown)   BMI 23.06 kg/m²   Constitutional: She is oriented to person, place, and time. She appears well-developed and well-nourished. No distress. thint  HENT:   Head: Normocephalic and atraumatic.   Eyes: Conjunctivae and EOM are normal. No scleral icterus.   Neck: Normal range of motion. Neck supple. No tracheal deviation present.   Cardiovascular: Normal rate.    Pulmonary/Chest: Effort normal. No respiratory distress. She exhibits no tenderness.  Breasts: are symmetrical.   Right breast exhibits no inverted nipple, no mass, no nipple discharge, no skin change and no tenderness.   Left breast exhibits no inverted nipple, no mass, no nipple discharge, no skin change and no tenderness.  Abdominal: Soft. She exhibits no distension and no mass. There is no tenderness. There is no rebound and  no guarding.   Genitourinary:    External rectal exam shows no thrombosed external hemorrhoids.    Pelvic exam was performed with patient supine.   No labial fusion.   There is no rash, lesion or injury on the right labia.   There is no rash, lesion or injury on the left labia.   No bleeding and no signs of injury around the vaginal introitus, urethra is without lesions and well supported.    No vaginal discharge found. atrophic   No significant Cystocele, Enterocele or rectocele, and cuff well supported.   Bimanual exam:   The urethra and vagina are without palpable masses or tenderness.   Uterus and cervix are surgically absents, vaginal cuff is intact and well supported.   Right adnexum displays no mass and no tenderness.   Left adnexum displays no mass and no tenderness.  Musculoskeletal: Normal range of motion.   Lymphadenopathy: No inguinal adenopathy present.   Neurological: She is alert and oriented to person, place, and time. Coordination normal.   Skin: Skin is warm and dry. She is not diaphoretic.   Psychiatric: She has a normal mood and affect.        Assessment:   Normal annual GYN exam      Plan:   PAP Not Needed  Mammogram  Follow up in 1 year.

## 2022-10-14 PROBLEM — Z98.890 H/O COLONOSCOPY: Status: ACTIVE | Noted: 2022-10-06

## 2022-10-22 ENCOUNTER — PATIENT MESSAGE (OUTPATIENT)
Dept: OBSTETRICS AND GYNECOLOGY | Facility: CLINIC | Age: 77
End: 2022-10-22
Payer: MEDICARE

## 2022-11-13 ENCOUNTER — PATIENT MESSAGE (OUTPATIENT)
Dept: CARDIOLOGY | Facility: CLINIC | Age: 77
End: 2022-11-13
Payer: MEDICARE

## 2022-11-14 ENCOUNTER — PATIENT MESSAGE (OUTPATIENT)
Dept: FAMILY MEDICINE | Facility: CLINIC | Age: 77
End: 2022-11-14

## 2022-11-14 ENCOUNTER — NURSE TRIAGE (OUTPATIENT)
Dept: ADMINISTRATIVE | Facility: CLINIC | Age: 77
End: 2022-11-14
Payer: MEDICARE

## 2022-11-14 NOTE — TELEPHONE ENCOUNTER
Pt called for c/o neck pain and she said that it started around 10:30 this am and has been putting heating pad and out a brace on because she couldn't lift her head up.Pt triaged and care advice to go to office now but unable to get appt since close to closing time and told to go to the  and to call back if any neck stiffness or fever or questions or concerns.          Reason for Disposition   SEVERE pain (e.g., excruciating, unable to do any normal activities)    Additional Information   Negative: Shock suspected (e.g., cold/pale/clammy skin, too weak to stand, low BP, rapid pulse)   Negative: Similar pain previously and it was from 'heart attack'   Negative: Similar pain previously from 'angina' and not relieved by nitroglycerin   Negative: Difficult to awaken or acting confused (e.g., disoriented, slurred speech)   Negative: Sounds like a life-threatening emergency to the triager   Negative: Difficulty breathing or unusual sweating (e.g., sweating without exertion)   Negative: Chest pain lasting longer than 5 minutes   Negative: Stiff neck (can't touch chin to chest) and has headache   Negative: Stiff neck (can't touch chin to chest) and fever   Negative: Weakness of an arm or hand   Negative: Problems with bowel or bladder control   Negative: Patient sounds very sick or weak to the triager    Protocols used: Neck Pain or [id number]

## 2022-11-15 ENCOUNTER — PATIENT MESSAGE (OUTPATIENT)
Dept: FAMILY MEDICINE | Facility: CLINIC | Age: 77
End: 2022-11-15

## 2022-12-28 ENCOUNTER — OFFICE VISIT (OUTPATIENT)
Dept: FAMILY MEDICINE | Facility: CLINIC | Age: 77
End: 2022-12-28
Payer: MEDICARE

## 2022-12-28 VITALS
SYSTOLIC BLOOD PRESSURE: 122 MMHG | HEART RATE: 79 BPM | WEIGHT: 141.63 LBS | BODY MASS INDEX: 22.76 KG/M2 | DIASTOLIC BLOOD PRESSURE: 70 MMHG | HEIGHT: 66 IN

## 2022-12-28 DIAGNOSIS — K21.9 GASTROESOPHAGEAL REFLUX DISEASE WITHOUT ESOPHAGITIS: Primary | ICD-10-CM

## 2022-12-28 DIAGNOSIS — L29.9 EAR ITCHING: ICD-10-CM

## 2022-12-28 DIAGNOSIS — R68.89 COLD INTOLERANCE: ICD-10-CM

## 2022-12-28 DIAGNOSIS — F41.8 MIXED ANXIETY DEPRESSIVE DISORDER: ICD-10-CM

## 2022-12-28 DIAGNOSIS — E78.2 MIXED HYPERLIPIDEMIA: ICD-10-CM

## 2022-12-28 PROCEDURE — 3288F FALL RISK ASSESSMENT DOCD: CPT | Mod: CPTII,S$GLB,, | Performed by: INTERNAL MEDICINE

## 2022-12-28 PROCEDURE — 1126F AMNT PAIN NOTED NONE PRSNT: CPT | Mod: CPTII,S$GLB,, | Performed by: INTERNAL MEDICINE

## 2022-12-28 PROCEDURE — 1101F PR PT FALLS ASSESS DOC 0-1 FALLS W/OUT INJ PAST YR: ICD-10-PCS | Mod: CPTII,S$GLB,, | Performed by: INTERNAL MEDICINE

## 2022-12-28 PROCEDURE — 1101F PT FALLS ASSESS-DOCD LE1/YR: CPT | Mod: CPTII,S$GLB,, | Performed by: INTERNAL MEDICINE

## 2022-12-28 PROCEDURE — 1159F MED LIST DOCD IN RCRD: CPT | Mod: CPTII,S$GLB,, | Performed by: INTERNAL MEDICINE

## 2022-12-28 PROCEDURE — 3074F SYST BP LT 130 MM HG: CPT | Mod: CPTII,S$GLB,, | Performed by: INTERNAL MEDICINE

## 2022-12-28 PROCEDURE — 3078F PR MOST RECENT DIASTOLIC BLOOD PRESSURE < 80 MM HG: ICD-10-PCS | Mod: CPTII,S$GLB,, | Performed by: INTERNAL MEDICINE

## 2022-12-28 PROCEDURE — 3078F DIAST BP <80 MM HG: CPT | Mod: CPTII,S$GLB,, | Performed by: INTERNAL MEDICINE

## 2022-12-28 PROCEDURE — 99213 OFFICE O/P EST LOW 20 MIN: CPT | Mod: S$GLB,,, | Performed by: INTERNAL MEDICINE

## 2022-12-28 PROCEDURE — 1160F PR REVIEW ALL MEDS BY PRESCRIBER/CLIN PHARMACIST DOCUMENTED: ICD-10-PCS | Mod: CPTII,S$GLB,, | Performed by: INTERNAL MEDICINE

## 2022-12-28 PROCEDURE — 3074F PR MOST RECENT SYSTOLIC BLOOD PRESSURE < 130 MM HG: ICD-10-PCS | Mod: CPTII,S$GLB,, | Performed by: INTERNAL MEDICINE

## 2022-12-28 PROCEDURE — 1159F PR MEDICATION LIST DOCUMENTED IN MEDICAL RECORD: ICD-10-PCS | Mod: CPTII,S$GLB,, | Performed by: INTERNAL MEDICINE

## 2022-12-28 PROCEDURE — 3288F PR FALLS RISK ASSESSMENT DOCUMENTED: ICD-10-PCS | Mod: CPTII,S$GLB,, | Performed by: INTERNAL MEDICINE

## 2022-12-28 PROCEDURE — 1160F RVW MEDS BY RX/DR IN RCRD: CPT | Mod: CPTII,S$GLB,, | Performed by: INTERNAL MEDICINE

## 2022-12-28 PROCEDURE — 99213 PR OFFICE/OUTPT VISIT, EST, LEVL III, 20-29 MIN: ICD-10-PCS | Mod: S$GLB,,, | Performed by: INTERNAL MEDICINE

## 2022-12-28 PROCEDURE — 1126F PR PAIN SEVERITY QUANTIFIED, NO PAIN PRESENT: ICD-10-PCS | Mod: CPTII,S$GLB,, | Performed by: INTERNAL MEDICINE

## 2022-12-28 PROCEDURE — 3044F HG A1C LEVEL LT 7.0%: CPT | Mod: CPTII,S$GLB,, | Performed by: INTERNAL MEDICINE

## 2022-12-28 PROCEDURE — 3044F PR MOST RECENT HEMOGLOBIN A1C LEVEL <7.0%: ICD-10-PCS | Mod: CPTII,S$GLB,, | Performed by: INTERNAL MEDICINE

## 2022-12-28 NOTE — PROGRESS NOTES
Subjective:       Patient ID: Farrah Boston is a 77 y.o. female.    Chief Complaint: Hypertension, Hyperlipidemia, Gastroesophageal Reflux, and Otalgia    Patient is a 77-year-old pleasant  female who comes for follow-up.  Underlying medical issues are as below:-    1.-hyperlipidemia  2.-hypertension  3.-gastroesophageal reflux.    Thus far she has been fine to behavior and be a good patient and watching her diet and taking medications as prescribed.  Her blood pressures are fairly under control.  Recent lipid panel was good.  Reflux symptoms of fair.      She complains of itching and discomfort in the ears.  No foreign body.  No fever.  No swallowing pain.    Hypertension  This is a chronic problem. The current episode started more than 1 year ago. The problem is controlled. Pertinent negatives include no chest pain, headaches, malaise/fatigue, palpitations, peripheral edema or shortness of breath. Risk factors for coronary artery disease include dyslipidemia. Past treatments include beta blockers. The current treatment provides moderate improvement. There is no history of renovascular disease or sleep apnea.   Hyperlipidemia  This is a chronic problem. The current episode started more than 1 year ago. The problem is controlled. Pertinent negatives include no chest pain, myalgias or shortness of breath. Current antihyperlipidemic treatment includes statins. The current treatment provides moderate improvement of lipids. Risk factors for coronary artery disease include homocysteinemia, hypertension and dyslipidemia.   Gastroesophageal Reflux  She complains of heartburn. She reports no abdominal pain, no chest pain, no choking, no coughing, no nausea or no sore throat. This is a chronic problem. The current episode started more than 1 year ago. The problem occurs occasionally. Pertinent negatives include no fatigue. She has tried nothing for the symptoms. The treatment provided moderate relief.   Otalgia    There is pain in both ears. This is a new problem. The current episode started in the past 7 days. The problem occurs constantly (More of a irritation or itching). The problem has been gradually improving. Pertinent negatives include no abdominal pain, coughing, diarrhea, headaches, rash or sore throat.     Past Medical History:   Diagnosis Date    Allergy     Anxiety     Depression     GERD (gastroesophageal reflux disease)     H/O colonoscopy 10/6/2022    Need for hepatitis C screening test 2/28/2018    Negative    Osteopenia     Reflux esophagitis     Seasonal allergies      Social History     Socioeconomic History    Marital status:      Spouse name: Mike Boston    Number of children: 3   Occupational History    Occupation: Journal Contributor/Writer     Comment: [employer]   Tobacco Use    Smoking status: Never    Smokeless tobacco: Never   Substance and Sexual Activity    Alcohol use: Yes     Comment: rarely    Drug use: No    Sexual activity: Yes     Partners: Male     Birth control/protection: None     Social Determinants of Health     Financial Resource Strain: Low Risk     Difficulty of Paying Living Expenses: Not very hard   Food Insecurity: No Food Insecurity    Worried About Running Out of Food in the Last Year: Never true    Ran Out of Food in the Last Year: Never true   Transportation Needs: No Transportation Needs    Lack of Transportation (Medical): No    Lack of Transportation (Non-Medical): No   Physical Activity: Sufficiently Active    Days of Exercise per Week: 6 days    Minutes of Exercise per Session: 30 min   Stress: Stress Concern Present    Feeling of Stress : To some extent   Social Connections: Socially Integrated    Frequency of Communication with Friends and Family: More than three times a week    Frequency of Social Gatherings with Friends and Family: More than three times a week    Attends Zoroastrianism Services: More than 4 times per year    Active Member of Clubs or  Organizations: Yes    Attends Club or Organization Meetings: 1 to 4 times per year    Marital Status:    Housing Stability: Low Risk     Unable to Pay for Housing in the Last Year: No    Number of Places Lived in the Last Year: 1    Unstable Housing in the Last Year: No     Past Surgical History:   Procedure Laterality Date    ABDOMINAL ADHESION SURGERY      ANTERIOR VAGINAL REPAIR      BREAST BIOPSY      benign    CHOLECYSTECTOMY      HERNIA REPAIR      HYSTERECTOMY       Family History   Problem Relation Age of Onset    Hypertension Mother     Cirrhosis Father     Breast cancer Maternal Aunt 70    Breast cancer Maternal Aunt 80    Breast cancer Paternal Grandmother 35    Ovarian cancer Neg Hx        Review of Systems   Constitutional:  Negative for activity change, appetite change, chills, diaphoresis, fatigue, fever, malaise/fatigue and unexpected weight change (gained 1 lb ).   HENT:  Positive for ear pain (Itching and irritation rather than pain.). Negative for congestion, facial swelling and sore throat.         Left-sided neck pain which suddenly came and went away.  She felt she had a bad sore throat.   Eyes:  Negative for photophobia, redness, itching and visual disturbance.   Respiratory:  Negative for cough, choking, chest tightness and shortness of breath.    Cardiovascular:  Negative for chest pain, palpitations and leg swelling.        White coat hypertension   Gastrointestinal:  Positive for heartburn. Negative for abdominal distention, abdominal pain, anal bleeding, diarrhea, nausea and rectal pain.        Few days back she had pelvic pain.   Endocrine: Negative for cold intolerance, polydipsia and polyuria.        Cold Intolerance     Genitourinary:  Negative for difficulty urinating, dysuria, flank pain and hematuria.   Musculoskeletal:  Positive for back pain (This is better now.). Negative for joint swelling and myalgias.        Bilateral knee pains and pelvic pains.  Suddenly came and  "suddenly disappeared.   Skin:  Negative for pallor, rash and wound.   Neurological:  Negative for dizziness, seizures and headaches.   Psychiatric/Behavioral:  Negative for agitation, behavioral problems and confusion. The patient is nervous/anxious (stable at this point.).         History patient is known to have a somewhat nervous and anxious disposition.  Zoloft was tried but did not do too well and she did not pursue beyond the 1st dosage.       Objective:      Blood pressure 122/70, pulse 79, height 5' 6" (1.676 m), weight 64.2 kg (141 lb 9.6 oz). Body mass index is 22.85 kg/m².  Physical Exam  Constitutional:       General: She is not in acute distress.     Appearance: She is well-developed. She is not ill-appearing or diaphoretic.      Comments: BMI is 22.85   HENT:      Head:      Comments: Face mask  Eyes:      General: No scleral icterus.        Right eye: No discharge.         Left eye: No discharge.   Neck:      Thyroid: No thyromegaly.      Trachea: No tracheal deviation.   Cardiovascular:      Rate and Rhythm: Normal rate and regular rhythm.   Pulmonary:      Effort: Pulmonary effort is normal.      Breath sounds: Normal breath sounds.   Abdominal:      General: There is no distension.      Palpations: Abdomen is soft.      Tenderness: There is no abdominal tenderness.   Musculoskeletal:         General: No deformity.      Lumbar back: No swelling, edema, deformity, tenderness or bony tenderness. Normal range of motion.      Right lower leg: No edema.      Left lower leg: No edema.   Skin:     Coloration: Skin is not pale.      Findings: No erythema or rash.   Neurological:      Mental Status: She is alert. Mental status is at baseline.      Motor: No tremor or atrophy.   Psychiatric:         Mood and Affect: Mood is elated.         Behavior: Behavior normal.         Thought Content: Thought content normal.      Comments: Slightly anxious.         Assessment:       1. Gastroesophageal reflux disease " without esophagitis    2. Mixed hyperlipidemia    3. Mixed anxiety depressive disorder    4. Cold intolerance    5. Ear itching           No visits with results within 3 Month(s) from this visit.   Latest known visit with results is:   Lab Visit on 04/25/2022   Component Date Value Ref Range Status    Cholesterol 04/25/2022 149  120 - 199 mg/dL Final    Triglycerides 04/25/2022 108  30 - 150 mg/dL Final    HDL 04/25/2022 43  40 - 75 mg/dL Final    LDL Cholesterol 04/25/2022 84.4  63.0 - 159.0 mg/dL Final    HDL/Cholesterol Ratio 04/25/2022 28.9  20.0 - 50.0 % Final    Total Cholesterol/HDL Ratio 04/25/2022 3.5  2.0 - 5.0 Final    Non-HDL Cholesterol 04/25/2022 106  mg/dL Final    Total Protein 04/25/2022 7.1  6.0 - 8.4 g/dL Final    Albumin 04/25/2022 4.3  3.5 - 5.2 g/dL Final    Total Bilirubin 04/25/2022 0.7  0.1 - 1.0 mg/dL Final    Bilirubin, Direct 04/25/2022 <0.1 (A)  0.1 - 0.3 mg/dL Final    AST 04/25/2022 23  10 - 40 U/L Final    ALT 04/25/2022 33  10 - 44 U/L Final    Alkaline Phosphatase 04/25/2022 86  55 - 135 U/L Final     Component Ref Range & Units 8 mo ago 11 mo ago 1 yr ago 3 yr ago 11 yr ago 13 yr ago 14 yr ago   Cholesterol 120 - 199 mg/dL 149  163 CM  152 CM  152 CM  223 High  CM  200 High  CM  225 High        Plan:           Gastroesophageal reflux disease without esophagitis    Mixed hyperlipidemia  -     Lipid Panel; Future; Expected date: 03/20/2023  -     Basic Metabolic Panel; Future; Expected date: 03/20/2023  -     ALT (SGPT); Future; Expected date: 03/20/2023    Mixed anxiety depressive disorder    Cold intolerance  Comments:  Patient is interested in knowing her thyroid test.  I feel her cold intolerance is generally her body habitus  Orders:  -     TSH; Future; Expected date: 03/20/2023    Ear itching  Comments:  Could not find anything significant in the ear drum.  Probably mild injection in the ear canal.  Could not find any significant psoriasis.  Try similisan      Patient's reflux  symptoms are doing okay.      Last lipid panel was good.      Her anxiety and stress seems to be stable at this point.      She does take multiple vitamins.      She has some ear itching and discomfort.  The examination of the ear is unremarkable.      Time to check her labs for cholesterol and chemistry next year before she comes back for follow-up.  She would also like to be sure that her thyroid is okay and I will add this lab also.  She does feel somewhat cold intolerance.      Her mother is 99 and has recently been diagnosed with pituitary tumor.  She has lost eyesight on 1 side.  Will keep her in our prayers.    Follow-up in 4-6 months time.  Earlier if needed.  Continue with COVID precautions.      Current Outpatient Medications:     atorvastatin (LIPITOR) 10 MG tablet, TAKE 1 TABLET EVERY DAY, Disp: 90 tablet, Rfl: 3    calcium-vitamin D3 (OS-LUIS 500 + D3) 500 mg(1,250mg) -200 unit per tablet, Take 1 tablet by mouth 2 (two) times daily with meals., Disp: , Rfl:     coQ10, ubiquinol, 200 mg Cap, Take 1 tablet by mouth Daily., Disp: , Rfl:     magnesium 200 mg Tab, Take 400 mg by mouth once., Disp: , Rfl:     metoprolol succinate (TOPROL-XL) 25 MG 24 hr tablet, TAKE 1 TABLET (25 MG TOTAL) BY MOUTH ONCE DAILY., Disp: 90 tablet, Rfl: 3    multivit-min/iron/folic/lutein (CENTRUM SILVER WOMEN ORAL), Take 1 tablet by mouth Daily., Disp: , Rfl:     polyethylene glycol 3350 (MIRALAX ORAL), Take by mouth. Take 3/4 capful by mouth nightly as needed, Disp: , Rfl:     varicella-zoster gE-AS01B, PF, (SHINGRIX, PF,) 50 mcg/0.5 mL injection, Inject 0.5 mLs into the muscle every 6 (six) months. First vaccination as baseline and repeat booster dosage within 2-6 months., Disp: 1 each, Rfl: 1

## 2023-01-11 ENCOUNTER — OFFICE VISIT (OUTPATIENT)
Dept: CARDIOLOGY | Facility: CLINIC | Age: 78
End: 2023-01-11
Payer: MEDICARE

## 2023-01-11 VITALS
HEART RATE: 60 BPM | OXYGEN SATURATION: 98 % | BODY MASS INDEX: 22.66 KG/M2 | SYSTOLIC BLOOD PRESSURE: 120 MMHG | HEIGHT: 66 IN | DIASTOLIC BLOOD PRESSURE: 62 MMHG | WEIGHT: 141 LBS | RESPIRATION RATE: 16 BRPM

## 2023-01-11 DIAGNOSIS — J30.2 SEASONAL ALLERGIC RHINITIS, UNSPECIFIED TRIGGER: ICD-10-CM

## 2023-01-11 DIAGNOSIS — I10 ESSENTIAL HYPERTENSION: ICD-10-CM

## 2023-01-11 DIAGNOSIS — K21.9 GASTROESOPHAGEAL REFLUX DISEASE WITHOUT ESOPHAGITIS: ICD-10-CM

## 2023-01-11 DIAGNOSIS — E78.5 DYSLIPIDEMIA: ICD-10-CM

## 2023-01-11 PROCEDURE — 1160F PR REVIEW ALL MEDS BY PRESCRIBER/CLIN PHARMACIST DOCUMENTED: ICD-10-PCS | Mod: CPTII,S$GLB,, | Performed by: INTERNAL MEDICINE

## 2023-01-11 PROCEDURE — 3078F DIAST BP <80 MM HG: CPT | Mod: CPTII,S$GLB,, | Performed by: INTERNAL MEDICINE

## 2023-01-11 PROCEDURE — 1159F MED LIST DOCD IN RCRD: CPT | Mod: CPTII,S$GLB,, | Performed by: INTERNAL MEDICINE

## 2023-01-11 PROCEDURE — 1101F PT FALLS ASSESS-DOCD LE1/YR: CPT | Mod: CPTII,S$GLB,, | Performed by: INTERNAL MEDICINE

## 2023-01-11 PROCEDURE — 1160F RVW MEDS BY RX/DR IN RCRD: CPT | Mod: CPTII,S$GLB,, | Performed by: INTERNAL MEDICINE

## 2023-01-11 PROCEDURE — 99213 PR OFFICE/OUTPT VISIT, EST, LEVL III, 20-29 MIN: ICD-10-PCS | Mod: S$GLB,,, | Performed by: INTERNAL MEDICINE

## 2023-01-11 PROCEDURE — 3074F SYST BP LT 130 MM HG: CPT | Mod: CPTII,S$GLB,, | Performed by: INTERNAL MEDICINE

## 2023-01-11 PROCEDURE — 3074F PR MOST RECENT SYSTOLIC BLOOD PRESSURE < 130 MM HG: ICD-10-PCS | Mod: CPTII,S$GLB,, | Performed by: INTERNAL MEDICINE

## 2023-01-11 PROCEDURE — 1101F PR PT FALLS ASSESS DOC 0-1 FALLS W/OUT INJ PAST YR: ICD-10-PCS | Mod: CPTII,S$GLB,, | Performed by: INTERNAL MEDICINE

## 2023-01-11 PROCEDURE — 1126F PR PAIN SEVERITY QUANTIFIED, NO PAIN PRESENT: ICD-10-PCS | Mod: CPTII,S$GLB,, | Performed by: INTERNAL MEDICINE

## 2023-01-11 PROCEDURE — 3288F FALL RISK ASSESSMENT DOCD: CPT | Mod: CPTII,S$GLB,, | Performed by: INTERNAL MEDICINE

## 2023-01-11 PROCEDURE — 1159F PR MEDICATION LIST DOCUMENTED IN MEDICAL RECORD: ICD-10-PCS | Mod: CPTII,S$GLB,, | Performed by: INTERNAL MEDICINE

## 2023-01-11 PROCEDURE — 3078F PR MOST RECENT DIASTOLIC BLOOD PRESSURE < 80 MM HG: ICD-10-PCS | Mod: CPTII,S$GLB,, | Performed by: INTERNAL MEDICINE

## 2023-01-11 PROCEDURE — 99213 OFFICE O/P EST LOW 20 MIN: CPT | Mod: S$GLB,,, | Performed by: INTERNAL MEDICINE

## 2023-01-11 PROCEDURE — 1126F AMNT PAIN NOTED NONE PRSNT: CPT | Mod: CPTII,S$GLB,, | Performed by: INTERNAL MEDICINE

## 2023-01-11 PROCEDURE — 3288F PR FALLS RISK ASSESSMENT DOCUMENTED: ICD-10-PCS | Mod: CPTII,S$GLB,, | Performed by: INTERNAL MEDICINE

## 2023-01-11 NOTE — ASSESSMENT & PLAN NOTE
Her blood pressure is stable at 1 20/60 mmHg encouraged her to continue same regimen including Toprol-XL 25 mg daily.  And maintain on low-salt diet and activity as tolerated

## 2023-01-11 NOTE — PROGRESS NOTES
" Subjective:    Patient ID:  Farrah Boston is a 77 y.o. female patient here for evaluation Follow-up and Hypertension      History of Present Illness:     Patient is here for follow-up evaluation for arterial hypertension.  She has a some stress more recently the mother's elements is celebrated her daughter's wedding.  She has done very well with no new symptoms of angina shortness of breath edema PND no arm GI  neurologic symptoms are        Review of patient's allergies indicates:   Allergen Reactions    Morphine Shortness Of Breath     Patient states her" breathing stops"    Codeine Nausea And Vomiting       Past Medical History:   Diagnosis Date    Allergy     Anxiety     Depression     GERD (gastroesophageal reflux disease)     H/O colonoscopy 10/6/2022    Need for hepatitis C screening test 2/28/2018    Negative    Osteopenia     Reflux esophagitis     Seasonal allergies      Past Surgical History:   Procedure Laterality Date    ABDOMINAL ADHESION SURGERY      ANTERIOR VAGINAL REPAIR      BREAST BIOPSY      benign    CHOLECYSTECTOMY      HERNIA REPAIR      HYSTERECTOMY       Social History     Tobacco Use    Smoking status: Never    Smokeless tobacco: Never   Substance Use Topics    Alcohol use: Yes     Comment: rarely    Drug use: No        Review of Systems:    As noted in HPI in addition      REVIEW OF SYSTEMS  CARDIOVASCULAR: No recent chest pain, palpitations, arm, neck, or jaw pain  RESPIRATORY: No recent fever, cough chills, SOB or congestion  : No blood in the urine  GI: No Nausea, vomiting, constipation, diarrhea, blood, or reflux.  MUSCULOSKELETAL: No myalgias  NEURO: No lightheadedness or dizziness  EYES: No Double vision, blurry, vision or headache              Objective        Vitals:    01/11/23 1008   BP: 120/62   Pulse: 60   Resp: 16       LIPIDS - LAST 2   Lab Results   Component Value Date    CHOL 149 04/25/2022    CHOL 163 01/11/2022    HDL 43 04/25/2022    HDL 45 01/11/2022    LDLCALC " 84.4 04/25/2022    LDLCALC 83.6 01/11/2022    TRIG 108 04/25/2022    TRIG 172 (H) 01/11/2022    CHOLHDL 28.9 04/25/2022    CHOLHDL 27.6 01/11/2022       CBC - LAST 2  Lab Results   Component Value Date    WBC 5.95 01/11/2022    WBC 6.03 06/14/2021    RBC 5.04 01/11/2022    RBC 4.83 06/14/2021    HGB 15.6 01/11/2022    HGB 14.9 06/14/2021    HCT 46.8 01/11/2022    HCT 45.1 06/14/2021    MCV 93 01/11/2022    MCV 93 06/14/2021    MCH 31.0 01/11/2022    MCH 30.8 06/14/2021    MCHC 33.3 01/11/2022    MCHC 33.0 06/14/2021    RDW 12.4 01/11/2022    RDW 12.4 06/14/2021     01/11/2022     06/14/2021    MPV 9.7 01/11/2022    MPV 9.9 06/14/2021    GRAN 3.2 01/11/2022    GRAN 54.2 01/11/2022    LYMPH 2.1 01/11/2022    LYMPH 36.0 01/11/2022    MONO 0.5 01/11/2022    MONO 8.1 01/11/2022    BASO 0.03 01/11/2022    BASO 0.02 06/19/2020    NRBC 0 01/11/2022    NRBC 0 06/19/2020       CHEMISTRY & LIVER FUNCTION - LAST 2  Lab Results   Component Value Date     01/11/2022     06/14/2021    K 4.1 01/11/2022    K 3.8 06/14/2021     01/11/2022     06/14/2021    CO2 26 01/11/2022    CO2 28 06/14/2021    ANIONGAP 11 01/11/2022    ANIONGAP 11 06/14/2021    BUN 18 01/11/2022    BUN 14 06/14/2021    CREATININE 0.7 01/11/2022    CREATININE 0.6 06/14/2021     (H) 01/11/2022    GLU 86 06/14/2021    CALCIUM 9.8 01/11/2022    CALCIUM 9.4 06/14/2021    MG 2.5 (H) 03/01/2005    ALBUMIN 4.3 04/25/2022    ALBUMIN 5.0 01/11/2022    PROT 7.1 04/25/2022    PROT 8.3 01/11/2022    ALKPHOS 86 04/25/2022    ALKPHOS 97 01/11/2022    ALT 33 04/25/2022    ALT 48 (H) 01/11/2022    AST 23 04/25/2022    AST 31 01/11/2022    BILITOT 0.7 04/25/2022    BILITOT 0.7 01/11/2022        CARDIAC PROFILE - LAST 2  Lab Results   Component Value Date    BNP 20 01/11/2022    TROPONINI <0.030 01/11/2022    TROPONINI <0.030 01/11/2022        COAGULATION - LAST 2  Lab Results   Component Value Date    LABPT 12.5 01/11/2022    INR 1.0  01/11/2022    INR 1.0 06/19/2009    APTT 27.4 06/19/2009       ENDOCRINE & PSA - LAST 2  Lab Results   Component Value Date    HGBA1C 5.5 01/11/2022    TSH 5.360 06/14/2021    TSH 3.53 11/25/2009        ECHOCARDIOGRAM RESULTS  No results found for this or any previous visit.      CURRENT/PREVIOUS VISIT EKG  Results for orders placed or performed during the hospital encounter of 01/11/22   EKG 12-lead    Collection Time: 01/11/22 11:23 AM    Narrative    Test Reason : R07.9,    Vent. Rate : 079 BPM     Atrial Rate : 079 BPM     P-R Int : 152 ms          QRS Dur : 080 ms      QT Int : 402 ms       P-R-T Axes : 051 -15 055 degrees     QTc Int : 460 ms    Normal sinus rhythm  Normal ECG  When compared with ECG of 23-JUN-2009 14:36,  No significant change was found  Confirmed by Reece Bowen MD ([id number]) on 1/15/2022 5:55:37 PM    Referred By: SUREKHA   SELF           Confirmed By:Reece Bowen MD     No valid procedures specified.   Results for orders placed during the hospital encounter of 01/11/22    Nuclear Stress Test    Interpretation Summary    The EKG portion of this study is negative for ischemia.    The patient reported no chest pain during the stress test.    There were no arrhythmias during stress.    The nuclear portion of this study will be reported separately.    No valid procedures specified.    PHYSICAL EXAM  CONSTITUTIONAL: Well built, well nourished in no apparent distress  NECK: no carotid bruit, no JVD  LUNGS: CTA  CHEST WALL: no tenderness  HEART: regular rate and rhythm, S1, S2 normal, no murmur, click, rub or gallop   ABDOMEN: soft, non-tender; bowel sounds normal; no masses,  no organomegaly  EXTREMITIES: Extremities normal, no edema, no calf tenderness noted  NEURO: AAO X 3    I HAVE REVIEWED :    The vital signs, nurses notes, and all the pertinent radiology and labs.        Current Outpatient Medications   Medication Instructions    atorvastatin (LIPITOR) 10 MG tablet TAKE 1 TABLET EVERY DAY     calcium-vitamin D3 (OS-LUIS 500 + D3) 500 mg(1,250mg) -200 unit per tablet 1 tablet, Oral, 2 times daily with meals    coQ10, ubiquinol, 200 mg Cap 1 tablet, Oral, Daily    magnesium 400 mg, Oral, Once    metoprolol succinate (TOPROL-XL) 25 mg, Oral, Daily    multivit-min/iron/folic/lutein (CENTRUM SILVER WOMEN ORAL) 1 tablet, Oral, Daily    polyethylene glycol 3350 (MIRALAX ORAL) Oral, Take 3/4 capful by mouth nightly as needed    varicella-zoster gE-AS01B, PF, (SHINGRIX, PF,) 50 mcg/0.5 mL injection 0.5 mLs, Intramuscular, Every 6 months, First vaccination as baseline and repeat booster dosage within 2-6 months.          Assessment & Plan     Essential hypertension  Her blood pressure is stable at 1 20/60 mmHg encouraged her to continue same regimen including Toprol-XL 25 mg daily.  And maintain on low-salt diet and activity as tolerated    Dyslipidemia  Continue on lipid modification with Lipitor 10 mg daily low-fat low-cholesterol diet she is stable on the present regimen    Seasonal allergic rhinitis  Reasonably controlled at this time.    Gastroesophageal reflux disease without esophagitis  Her dyspeptic symptoms are well controlled she is off both PPI agents as well as H2 blockers          Follow up in about 6 months (around 7/11/2023).

## 2023-01-11 NOTE — ASSESSMENT & PLAN NOTE
Continue on lipid modification with Lipitor 10 mg daily low-fat low-cholesterol diet she is stable on the present regimen

## 2023-04-06 ENCOUNTER — PATIENT MESSAGE (OUTPATIENT)
Dept: OBSTETRICS AND GYNECOLOGY | Facility: CLINIC | Age: 78
End: 2023-04-06
Payer: MEDICARE

## 2023-04-07 NOTE — ASSESSMENT & PLAN NOTE
MD discharge orders noted. Patient AAOx4 and in NAD. V/s stable. The patient successfully ambulated to the restroom without any adverse events. IV and tele discontinued. An AVS was provided and discussed completely including the following: f/u appointments, changes to the med recc, and education pertinent to this visit. The patient verbalized their understanding and was escorted via ambulation to their ride's private vehicle.    Symptoms have been relatively well controlled continue conservative treatment with p.r.n. usage of PPI agents.

## 2023-04-25 ENCOUNTER — PATIENT MESSAGE (OUTPATIENT)
Dept: FAMILY MEDICINE | Facility: CLINIC | Age: 78
End: 2023-04-25

## 2023-04-28 ENCOUNTER — LAB VISIT (OUTPATIENT)
Dept: LAB | Facility: HOSPITAL | Age: 78
End: 2023-04-28
Attending: INTERNAL MEDICINE
Payer: MEDICARE

## 2023-04-28 DIAGNOSIS — E78.2 MIXED HYPERLIPIDEMIA: ICD-10-CM

## 2023-04-28 DIAGNOSIS — R68.89 COLD INTOLERANCE: ICD-10-CM

## 2023-04-28 LAB
ALT SERPL W/O P-5'-P-CCNC: 35 U/L (ref 10–44)
ANION GAP SERPL CALC-SCNC: 7 MMOL/L (ref 8–16)
BUN SERPL-MCNC: 15 MG/DL (ref 8–23)
CALCIUM SERPL-MCNC: 9.4 MG/DL (ref 8.7–10.5)
CHLORIDE SERPL-SCNC: 106 MMOL/L (ref 95–110)
CHOLEST SERPL-MCNC: 137 MG/DL (ref 120–199)
CHOLEST/HDLC SERPL: 3.3 {RATIO} (ref 2–5)
CO2 SERPL-SCNC: 27 MMOL/L (ref 23–29)
CREAT SERPL-MCNC: 0.6 MG/DL (ref 0.5–1.4)
EST. GFR  (NO RACE VARIABLE): >60 ML/MIN/1.73 M^2
GLUCOSE SERPL-MCNC: 87 MG/DL (ref 70–110)
HDLC SERPL-MCNC: 42 MG/DL (ref 40–75)
HDLC SERPL: 30.7 % (ref 20–50)
LDLC SERPL CALC-MCNC: 80.4 MG/DL (ref 63–159)
NONHDLC SERPL-MCNC: 95 MG/DL
POTASSIUM SERPL-SCNC: 4.1 MMOL/L (ref 3.5–5.1)
SODIUM SERPL-SCNC: 140 MMOL/L (ref 136–145)
T4 FREE SERPL-MCNC: 0.66 NG/DL (ref 0.71–1.51)
TRIGL SERPL-MCNC: 73 MG/DL (ref 30–150)
TSH SERPL DL<=0.005 MIU/L-ACNC: 8.14 UIU/ML (ref 0.34–5.6)

## 2023-04-28 PROCEDURE — 36415 COLL VENOUS BLD VENIPUNCTURE: CPT | Performed by: INTERNAL MEDICINE

## 2023-04-28 PROCEDURE — 80048 BASIC METABOLIC PNL TOTAL CA: CPT | Performed by: INTERNAL MEDICINE

## 2023-04-28 PROCEDURE — 84443 ASSAY THYROID STIM HORMONE: CPT | Performed by: INTERNAL MEDICINE

## 2023-04-28 PROCEDURE — 84439 ASSAY OF FREE THYROXINE: CPT | Performed by: INTERNAL MEDICINE

## 2023-04-28 PROCEDURE — 80061 LIPID PANEL: CPT | Performed by: INTERNAL MEDICINE

## 2023-04-28 PROCEDURE — 84460 ALANINE AMINO (ALT) (SGPT): CPT | Performed by: INTERNAL MEDICINE

## 2023-04-30 NOTE — PROGRESS NOTES
Please review your labs.  Your thyroid tests free T4 is low and TSH is elevated indicating probably beginnings of thyroid deficiency.  Other labs are okay.  Will discuss at follow-up.

## 2023-05-02 ENCOUNTER — OFFICE VISIT (OUTPATIENT)
Dept: FAMILY MEDICINE | Facility: CLINIC | Age: 78
End: 2023-05-02
Payer: MEDICARE

## 2023-05-02 VITALS
SYSTOLIC BLOOD PRESSURE: 132 MMHG | HEART RATE: 77 BPM | DIASTOLIC BLOOD PRESSURE: 75 MMHG | HEIGHT: 66 IN | BODY MASS INDEX: 22.5 KG/M2 | WEIGHT: 140 LBS

## 2023-05-02 DIAGNOSIS — E78.2 MIXED HYPERLIPIDEMIA: Primary | ICD-10-CM

## 2023-05-02 DIAGNOSIS — K21.9 GASTROESOPHAGEAL REFLUX DISEASE WITHOUT ESOPHAGITIS: ICD-10-CM

## 2023-05-02 DIAGNOSIS — N89.8 VAGINAL DRYNESS: ICD-10-CM

## 2023-05-02 DIAGNOSIS — R68.89 COLD INTOLERANCE: ICD-10-CM

## 2023-05-02 DIAGNOSIS — R79.89 ABNORMAL THYROID BLOOD TEST: ICD-10-CM

## 2023-05-02 PROCEDURE — 1126F AMNT PAIN NOTED NONE PRSNT: CPT | Mod: CPTII,S$GLB,, | Performed by: INTERNAL MEDICINE

## 2023-05-02 PROCEDURE — 1160F PR REVIEW ALL MEDS BY PRESCRIBER/CLIN PHARMACIST DOCUMENTED: ICD-10-PCS | Mod: CPTII,S$GLB,, | Performed by: INTERNAL MEDICINE

## 2023-05-02 PROCEDURE — 1126F PR PAIN SEVERITY QUANTIFIED, NO PAIN PRESENT: ICD-10-PCS | Mod: CPTII,S$GLB,, | Performed by: INTERNAL MEDICINE

## 2023-05-02 PROCEDURE — 1159F PR MEDICATION LIST DOCUMENTED IN MEDICAL RECORD: ICD-10-PCS | Mod: CPTII,S$GLB,, | Performed by: INTERNAL MEDICINE

## 2023-05-02 PROCEDURE — 3078F PR MOST RECENT DIASTOLIC BLOOD PRESSURE < 80 MM HG: ICD-10-PCS | Mod: CPTII,S$GLB,, | Performed by: INTERNAL MEDICINE

## 2023-05-02 PROCEDURE — 1159F MED LIST DOCD IN RCRD: CPT | Mod: CPTII,S$GLB,, | Performed by: INTERNAL MEDICINE

## 2023-05-02 PROCEDURE — 3075F PR MOST RECENT SYSTOLIC BLOOD PRESS GE 130-139MM HG: ICD-10-PCS | Mod: CPTII,S$GLB,, | Performed by: INTERNAL MEDICINE

## 2023-05-02 PROCEDURE — 3288F PR FALLS RISK ASSESSMENT DOCUMENTED: ICD-10-PCS | Mod: CPTII,S$GLB,, | Performed by: INTERNAL MEDICINE

## 2023-05-02 PROCEDURE — 99213 OFFICE O/P EST LOW 20 MIN: CPT | Mod: S$GLB,,, | Performed by: INTERNAL MEDICINE

## 2023-05-02 PROCEDURE — 3078F DIAST BP <80 MM HG: CPT | Mod: CPTII,S$GLB,, | Performed by: INTERNAL MEDICINE

## 2023-05-02 PROCEDURE — 1101F PT FALLS ASSESS-DOCD LE1/YR: CPT | Mod: CPTII,S$GLB,, | Performed by: INTERNAL MEDICINE

## 2023-05-02 PROCEDURE — 3288F FALL RISK ASSESSMENT DOCD: CPT | Mod: CPTII,S$GLB,, | Performed by: INTERNAL MEDICINE

## 2023-05-02 PROCEDURE — 3075F SYST BP GE 130 - 139MM HG: CPT | Mod: CPTII,S$GLB,, | Performed by: INTERNAL MEDICINE

## 2023-05-02 PROCEDURE — 1101F PR PT FALLS ASSESS DOC 0-1 FALLS W/OUT INJ PAST YR: ICD-10-PCS | Mod: CPTII,S$GLB,, | Performed by: INTERNAL MEDICINE

## 2023-05-02 PROCEDURE — 99213 PR OFFICE/OUTPT VISIT, EST, LEVL III, 20-29 MIN: ICD-10-PCS | Mod: S$GLB,,, | Performed by: INTERNAL MEDICINE

## 2023-05-02 PROCEDURE — 1160F RVW MEDS BY RX/DR IN RCRD: CPT | Mod: CPTII,S$GLB,, | Performed by: INTERNAL MEDICINE

## 2023-05-02 NOTE — PROGRESS NOTES
Subjective:       Patient ID: Farrah Boston is a 78 y.o. female.    Chief Complaint: Hypertension, Hyperlipidemia, Gastroesophageal Reflux, and Vaginal Atrophy    Patient is a 78-year-old pleasant  female who comes for follow-up.  Underlying medical issues are as below:-    1.-hyperlipidemia  2.-hypertension  3.-gastroesophageal reflux.  4.-vaginal atrophy and dryness leading to pain.  Currently on Premarin.    Her cholesterol numbers show improvement.  She has been watching her diet and cut down on the carbohydrates.      Her  will be going through some surgeries in future and she will be busy with that.      She is concerned about using Premarin vaginal cream.  She has read a label on the top of medicine that it was a dangerous medication.  I have advised the patient that every medication has pros and cons and Premarin being no different.  With her being a nonsmoker and no pre-existing heart condition at no cancers, the restaurant below.  At any rate she will be using it perhaps twice a week only.        Hypertension  This is a chronic problem. The current episode started more than 1 year ago. The problem is controlled. Pertinent negatives include no chest pain, malaise/fatigue, palpitations or peripheral edema. Risk factors for coronary artery disease include dyslipidemia. Past treatments include beta blockers. The current treatment provides moderate improvement. There is no history of renovascular disease or sleep apnea.   Hyperlipidemia  This is a chronic problem. The current episode started more than 1 year ago. The problem is controlled. Pertinent negatives include no chest pain or myalgias. Current antihyperlipidemic treatment includes statins. The current treatment provides moderate improvement of lipids. Risk factors for coronary artery disease include homocysteinemia, hypertension and dyslipidemia.   Gastroesophageal Reflux  She complains of heartburn. She reports no chest pain or no  nausea. This is a chronic problem. The current episode started more than 1 year ago. The problem occurs occasionally. Pertinent negatives include no fatigue. She has tried nothing for the symptoms. The treatment provided moderate relief.     Past Medical History:   Diagnosis Date    Allergy     Anxiety     Depression     GERD (gastroesophageal reflux disease)     H/O colonoscopy 10/6/2022    Need for hepatitis C screening test 2/28/2018    Negative    Osteopenia     Reflux esophagitis     Seasonal allergies      Social History     Socioeconomic History    Marital status:      Spouse name: Mike Boston    Number of children: 3   Occupational History    Occupation: Journal Contributor/Writer     Comment: [employer]   Tobacco Use    Smoking status: Never    Smokeless tobacco: Never   Substance and Sexual Activity    Alcohol use: Yes     Comment: rarely    Drug use: No    Sexual activity: Yes     Partners: Male     Birth control/protection: None     Social Determinants of Health     Financial Resource Strain: Low Risk     Difficulty of Paying Living Expenses: Not very hard   Food Insecurity: No Food Insecurity    Worried About Running Out of Food in the Last Year: Never true    Ran Out of Food in the Last Year: Never true   Transportation Needs: No Transportation Needs    Lack of Transportation (Medical): No    Lack of Transportation (Non-Medical): No   Physical Activity: Inactive    Days of Exercise per Week: 0 days    Minutes of Exercise per Session: 0 min   Stress: No Stress Concern Present    Feeling of Stress : Only a little   Social Connections: Socially Integrated    Frequency of Communication with Friends and Family: More than three times a week    Frequency of Social Gatherings with Friends and Family: More than three times a week    Attends Buddhist Services: 1 to 4 times per year    Active Member of Clubs or Organizations: Yes    Attends Club or Organization Meetings: 1 to 4 times per year     "Marital Status:    Housing Stability: Low Risk     Unable to Pay for Housing in the Last Year: No    Number of Places Lived in the Last Year: 1    Unstable Housing in the Last Year: No     Past Surgical History:   Procedure Laterality Date    ABDOMINAL ADHESION SURGERY      ANTERIOR VAGINAL REPAIR      BREAST BIOPSY      benign    CHOLECYSTECTOMY      HERNIA REPAIR      HYSTERECTOMY       Family History   Problem Relation Age of Onset    Hypertension Mother     Cirrhosis Father     Breast cancer Maternal Aunt 70    Breast cancer Maternal Aunt 80    Breast cancer Paternal Grandmother 35    Ovarian cancer Neg Hx        Review of Systems   Constitutional:  Negative for activity change, fatigue, malaise/fatigue and unexpected weight change.   Eyes:  Negative for discharge, redness and visual disturbance.   Cardiovascular:  Negative for chest pain and palpitations.        Hyperlipidemia.  Better controlled.   Gastrointestinal:  Positive for heartburn. Negative for abdominal distention, blood in stool and nausea.   Endocrine: Negative for cold intolerance, polydipsia and polyuria.   Genitourinary:  Negative for difficulty urinating, flank pain and urgency.   Musculoskeletal:  Negative for arthralgias, gait problem and myalgias.   Skin:  Negative for color change and pallor.       Objective:      Blood pressure 132/75, pulse 77, height 5' 6" (1.676 m), weight 63.5 kg (140 lb). Body mass index is 22.6 kg/m².  Physical Exam  Constitutional:       General: She is not in acute distress.     Appearance: She is well-developed. She is not ill-appearing or diaphoretic.      Comments: BMI is 22.60   Eyes:      General: No scleral icterus.        Right eye: No discharge.         Left eye: No discharge.   Neck:      Thyroid: No thyromegaly.      Trachea: No tracheal deviation.   Cardiovascular:      Rate and Rhythm: Normal rate and regular rhythm.   Pulmonary:      Effort: Pulmonary effort is normal.      Breath sounds: Normal " breath sounds.   Abdominal:      General: There is no distension.      Palpations: Abdomen is soft.      Tenderness: There is no abdominal tenderness.   Musculoskeletal:         General: No deformity.      Lumbar back: No swelling, edema, deformity, tenderness or bony tenderness. Normal range of motion.      Right lower leg: No edema.      Left lower leg: No edema.   Skin:     Coloration: Skin is not pale.      Findings: No erythema or rash.   Neurological:      Mental Status: She is alert. Mental status is at baseline.      Motor: No tremor or atrophy.   Psychiatric:         Mood and Affect: Mood is elated.         Behavior: Behavior normal.         Thought Content: Thought content normal.      Comments: Slightly anxious.         Assessment:               Mixed hyperlipidemia  Comments:  Her lipid panel is better.  She is on Lipitor 10 mg.    Cold intolerance  Comments:  Possibly thin body status and hypothyroidism.  Orders:  -     T4, free; Future; Expected date: 07/24/2023  -     TSH; Future; Expected date: 07/24/2023    Abnormal thyroid blood test  Comments:  Free T4 is slightly low at 0.66  and TSH is elevated.  Probably early hypothyroidism and will recheck again in 4 months time.  Family history of hypothyroidism.  Orders:  -     T4, free; Future; Expected date: 07/24/2023  -     TSH; Future; Expected date: 07/24/2023    Gastroesophageal reflux disease without esophagitis  Comments:  Stable as she watches her diet.    Vaginal dryness  Comments:  I have reassured her that use of Premarin cream will not be putting her at a great risk and every medication has a potential small risk.       Lab Visit on 04/28/2023   Component Date Value Ref Range Status    Cholesterol 04/28/2023 137  120 - 199 mg/dL Final    Triglycerides 04/28/2023 73  30 - 150 mg/dL Final    HDL 04/28/2023 42  40 - 75 mg/dL Final    LDL Cholesterol 04/28/2023 80.4  63.0 - 159.0 mg/dL Final    HDL/Cholesterol Ratio 04/28/2023 30.7  20.0 - 50.0 %  Final    Total Cholesterol/HDL Ratio 04/28/2023 3.3  2.0 - 5.0 Final    Non-HDL Cholesterol 04/28/2023 95  mg/dL Final    Sodium 04/28/2023 140  136 - 145 mmol/L Final    Potassium 04/28/2023 4.1  3.5 - 5.1 mmol/L Final    Chloride 04/28/2023 106  95 - 110 mmol/L Final    CO2 04/28/2023 27  23 - 29 mmol/L Final    Glucose 04/28/2023 87  70 - 110 mg/dL Final    BUN 04/28/2023 15  8 - 23 mg/dL Final    Creatinine 04/28/2023 0.6  0.5 - 1.4 mg/dL Final    Calcium 04/28/2023 9.4  8.7 - 10.5 mg/dL Final    Anion Gap 04/28/2023 7 (L)  8 - 16 mmol/L Final    eGFR 04/28/2023 >60.0  >60 mL/min/1.73 m^2 Final    ALT 04/28/2023 35  10 - 44 U/L Final    TSH 04/28/2023 8.140 (H)  0.340 - 5.600 uIU/mL Final    Free T4 04/28/2023 0.66 (L)  0.71 - 1.51 ng/dL Final     Mixed hyperlipidemia  Comments:  Her lipid panel is better.  She is on Lipitor 10 mg.    Cold intolerance  Comments:  Possibly thin body status and hypothyroidism.  Orders:  -     T4, free; Future; Expected date: 07/24/2023  -     TSH; Future; Expected date: 07/24/2023    Abnormal thyroid blood test  Comments:  Free T4 is slightly low at 0.66  and TSH is elevated.  Probably early hypothyroidism and will recheck again in 4 months time.  Family history of hypothyroidism.  Orders:  -     T4, free; Future; Expected date: 07/24/2023  -     TSH; Future; Expected date: 07/24/2023    Gastroesophageal reflux disease without esophagitis  Comments:  Stable as she watches her diet.    Vaginal dryness  Comments:  I have reassured her that use of Premarin cream will not be putting her at a great risk and every medication has a potential small risk.         Plan:           Mixed hyperlipidemia  Comments:  Her lipid panel is better.  She is on Lipitor 10 mg.    Cold intolerance  Comments:  Possibly thin body status and hypothyroidism.  Orders:  -     T4, free; Future; Expected date: 07/24/2023  -     TSH; Future; Expected date: 07/24/2023    Abnormal thyroid blood  test  Comments:  Free T4 is slightly low at 0.66  and TSH is elevated.  Probably early hypothyroidism and will recheck again in 4 months time.  Family history of hypothyroidism.  Orders:  -     T4, free; Future; Expected date: 07/24/2023  -     TSH; Future; Expected date: 07/24/2023    Gastroesophageal reflux disease without esophagitis  Comments:  Stable as she watches her diet.    Vaginal dryness  Comments:  I have reassured her that use of Premarin cream will not be putting her at a great risk and every medication has a potential small risk.      Today we noted that her TSH is somewhat elevated and free T4 is low.  She does have strong history of hypothyroidism in family.  Her mother does take levothyroxine.  Thus far her thyroid levels were more or less okay except for 1 occasion.  At this point patient would like to wait and watch and will repeat the test again in 4 months time and see how she does.      She does have some vaginal dryness which is irritating and bothersome.  She read the side effects of estrogens as dangerous medication and she stopped using it.  I have encouraged her to continue using it and it is not dangerous.  It is dangerous only for people who have underlying potential heart disease, breast cancers or who are smokers.  Certainly there is no medication in the world which is without dangers and side effects.  Premarin is 1 of them.    As far as diet is concerned, she is particularly watching her diet in his cut down on the sugars.  Her cholesterol numbers have further come down.  She is taking atorvastatin 10 mg without any problems.      Her  will be going through surgery in future and she will be busy taking care of him in the next few months.      I would like to see her back in 4 months time.    Spent brittany 23 minutes with patient which involved review of pts medical conditions, labs, medications and with 50% of time face-to-face discussion about medical problems, management and  any applicable changes.        Current Outpatient Medications:     atorvastatin (LIPITOR) 10 MG tablet, TAKE 1 TABLET EVERY DAY, Disp: 90 tablet, Rfl: 3    calcium-vitamin D3 (OS-LUIS 500 + D3) 500 mg(1,250mg) -200 unit per tablet, Take 1 tablet by mouth 2 (two) times daily with meals., Disp: , Rfl:     conjugated estrogens (PREMARIN) vaginal cream, Place 1 g vaginally twice a week., Disp: 45 g, Rfl: 1    coQ10, ubiquinol, 200 mg Cap, Take 1 tablet by mouth Daily., Disp: , Rfl:     magnesium 200 mg Tab, Take 400 mg by mouth once., Disp: , Rfl:     metoprolol succinate (TOPROL-XL) 25 MG 24 hr tablet, TAKE 1 TABLET (25 MG TOTAL) BY MOUTH ONCE DAILY., Disp: 90 tablet, Rfl: 3    multivit-min/iron/folic/lutein (CENTRUM SILVER WOMEN ORAL), Take 1 tablet by mouth Daily., Disp: , Rfl:     polyethylene glycol 3350 (MIRALAX ORAL), Take by mouth. Take 3/4 capful by mouth nightly as needed, Disp: , Rfl:

## 2023-05-15 ENCOUNTER — OFFICE VISIT (OUTPATIENT)
Dept: URGENT CARE | Facility: CLINIC | Age: 78
End: 2023-05-15
Payer: MEDICARE

## 2023-05-15 VITALS
DIASTOLIC BLOOD PRESSURE: 76 MMHG | BODY MASS INDEX: 23.82 KG/M2 | RESPIRATION RATE: 16 BRPM | HEART RATE: 79 BPM | OXYGEN SATURATION: 100 % | WEIGHT: 143 LBS | HEIGHT: 65 IN | SYSTOLIC BLOOD PRESSURE: 128 MMHG | TEMPERATURE: 99 F

## 2023-05-15 DIAGNOSIS — S99.922A INJURY OF LEFT FOOT, INITIAL ENCOUNTER: Primary | ICD-10-CM

## 2023-05-15 DIAGNOSIS — S93.602A FOOT SPRAIN, LEFT, INITIAL ENCOUNTER: ICD-10-CM

## 2023-05-15 PROCEDURE — 73630 XR FOOT COMPLETE 3 VIEW LEFT: ICD-10-PCS | Mod: LT,S$GLB,, | Performed by: RADIOLOGY

## 2023-05-15 PROCEDURE — 99213 PR OFFICE/OUTPT VISIT, EST, LEVL III, 20-29 MIN: ICD-10-PCS | Mod: S$GLB,,, | Performed by: EMERGENCY MEDICINE

## 2023-05-15 PROCEDURE — 99213 OFFICE O/P EST LOW 20 MIN: CPT | Mod: S$GLB,,, | Performed by: EMERGENCY MEDICINE

## 2023-05-15 PROCEDURE — 73630 X-RAY EXAM OF FOOT: CPT | Mod: LT,S$GLB,, | Performed by: RADIOLOGY

## 2023-05-15 RX ORDER — NAPROXEN 500 MG/1
500 TABLET ORAL 2 TIMES DAILY WITH MEALS
Qty: 30 TABLET | Refills: 0 | Status: SHIPPED | OUTPATIENT
Start: 2023-05-15 | End: 2023-05-29

## 2023-05-15 NOTE — PROGRESS NOTES
"Subjective:      Patient ID: Farrah Boston is a 78 y.o. female.    Vitals:  height is 5' 5" (1.651 m) and weight is 64.9 kg (143 lb). Her temperature is 98.8 °F (37.1 °C). Her blood pressure is 128/76 and her pulse is 79. Her respiration is 16 and oxygen saturation is 100%.     Chief Complaint: Foot Injury    78-year-old female fell yesterday and injured her left foot and has pain on the lateral aspect of the left foot.  Patient states she is walking on her heel as it hurts.  Denies any other injuries or complaints.    Foot Injury   Incident onset: x 1 day. The pain is present in the left foot.     Constitution: Negative.   HENT: Negative.     Neck: neck negative.   Cardiovascular: Negative.    Eyes: Negative.    Respiratory: Negative.     Gastrointestinal: Negative.    Genitourinary: Negative.    Musculoskeletal: Negative.  Positive for trauma.   Skin: Negative.    Allergic/Immunologic: Negative.    Neurological: Negative.  Negative for headaches.   Hematologic/Lymphatic: Negative.     Objective:     Physical Exam   Constitutional: She is oriented to person, place, and time. She appears well-developed. She is cooperative.   HENT:   Head: Normocephalic and atraumatic.   Ears:   Right Ear: Hearing, tympanic membrane, external ear and ear canal normal.   Left Ear: Hearing, tympanic membrane, external ear and ear canal normal.   Nose: Nose normal. No mucosal edema or nasal deformity. No epistaxis. Right sinus exhibits no maxillary sinus tenderness and no frontal sinus tenderness. Left sinus exhibits no maxillary sinus tenderness and no frontal sinus tenderness.   Mouth/Throat: Uvula is midline, oropharynx is clear and moist and mucous membranes are normal. No trismus in the jaw. Normal dentition. No uvula swelling.   Eyes: Conjunctivae and lids are normal.   Neck: Trachea normal and phonation normal. Neck supple.   Cardiovascular: Normal rate, regular rhythm, normal heart sounds and normal pulses.   Pulmonary/Chest: " Effort normal and breath sounds normal.   Abdominal: Normal appearance and bowel sounds are normal. Soft.   Musculoskeletal: Normal range of motion.         General: Tenderness present. Normal range of motion.      Comments: Left foot injury and tenderness on the lateral aspect of the left foot in the area of the cuboid bone.  Extremities neurovascularly intact.   Neurological: She is alert and oriented to person, place, and time. She exhibits normal muscle tone.   Skin: Skin is warm, dry and intact.   Psychiatric: Her speech is normal and behavior is normal. Judgment and thought content normal.   Nursing note and vitals reviewed.    Assessment:     1. Injury of left foot, initial encounter    2. Foot sprain, left, initial encounter        Plan:       Injury of left foot, initial encounter  -     X-Ray Foot Complete 3 view Left; Future; Expected date: 05/15/2023    Foot sprain, left, initial encounter    Other orders  -     naproxen (NAPROSYN) 500 MG tablet; Take 1 tablet (500 mg total) by mouth 2 (two) times daily with meals.  Dispense: 30 tablet; Refill: 0      X-ray negative for any acute fracture.  Ace wrap applied.  Fracture suspected however no obvious fracture noted.  Return precautions given and patient to have repeat x-ray if pain does not resolve in 1 week.    Medical Decision Making:   Differential Diagnosis:   Patient with left foot injury and will x-ray the left foot to look for fracture.  Extremity otherwise neurologically intact and vascularity is intact.  Clinical Tests:   Radiological Study: Reviewed and Ordered     Ace wrap applied and extremities neurovascularly intact after Ace wrap application

## 2023-05-20 ENCOUNTER — PATIENT MESSAGE (OUTPATIENT)
Dept: FAMILY MEDICINE | Facility: CLINIC | Age: 78
End: 2023-05-20

## 2023-05-23 ENCOUNTER — OFFICE VISIT (OUTPATIENT)
Dept: URGENT CARE | Facility: CLINIC | Age: 78
End: 2023-05-23
Payer: MEDICARE

## 2023-05-23 VITALS
BODY MASS INDEX: 23.82 KG/M2 | DIASTOLIC BLOOD PRESSURE: 65 MMHG | TEMPERATURE: 98 F | SYSTOLIC BLOOD PRESSURE: 122 MMHG | HEIGHT: 65 IN | OXYGEN SATURATION: 99 % | WEIGHT: 143 LBS | RESPIRATION RATE: 16 BRPM | HEART RATE: 71 BPM

## 2023-05-23 DIAGNOSIS — R10.2 SUPRAPUBIC ABDOMINAL PAIN: Primary | ICD-10-CM

## 2023-05-23 LAB
BILIRUB UR QL STRIP: NEGATIVE
GLUCOSE UR QL STRIP: NEGATIVE
KETONES UR QL STRIP: NEGATIVE
LEUKOCYTE ESTERASE UR QL STRIP: NEGATIVE
PH, POC UA: 6
POC BLOOD, URINE: NEGATIVE
POC NITRATES, URINE: NEGATIVE
PROT UR QL STRIP: NEGATIVE
SP GR UR STRIP: 1 (ref 1–1.03)
UROBILINOGEN UR STRIP-ACNC: NORMAL (ref 0.1–1.1)

## 2023-05-23 PROCEDURE — 99214 PR OFFICE/OUTPT VISIT, EST, LEVL IV, 30-39 MIN: ICD-10-PCS | Mod: S$GLB,,, | Performed by: NURSE PRACTITIONER

## 2023-05-23 PROCEDURE — 99214 OFFICE O/P EST MOD 30 MIN: CPT | Mod: S$GLB,,, | Performed by: NURSE PRACTITIONER

## 2023-05-23 RX ORDER — CELECOXIB 200 MG/1
200 CAPSULE ORAL 2 TIMES DAILY PRN
Qty: 14 CAPSULE | Refills: 0 | Status: SHIPPED | OUTPATIENT
Start: 2023-05-23 | End: 2023-05-29

## 2023-05-23 RX ORDER — PHENAZOPYRIDINE HYDROCHLORIDE 100 MG/1
100 TABLET, FILM COATED ORAL 3 TIMES DAILY PRN
Qty: 6 TABLET | Refills: 0 | Status: SHIPPED | OUTPATIENT
Start: 2023-05-23 | End: 2023-05-25

## 2023-05-23 NOTE — PATIENT INSTRUCTIONS
Start pyridium as needed. Increase clear fluid intake  May take celebrex as needed as well  Follow up with PCP and Urology referral  Go directly to the ER for development of fever, weakness, chest pain, shortness of breath, or other emergent concern  Return to this clinic for any future concern.

## 2023-05-23 NOTE — PROGRESS NOTES
"Subjective:      Patient ID: Farrah Boston is a 78 y.o. female.    Vitals:  height is 5' 5" (1.651 m) and weight is 64.9 kg (143 lb). Her temperature is 98.2 °F (36.8 °C). Her blood pressure is 122/65 and her pulse is 71. Her respiration is 16 and oxygen saturation is 99%.     Chief Complaint: Back Pain    Pt states she thinks she has a UTI, Seen by another UC 3 weeks ago  & was dx w/ UTI was prescribed Cipro finished it but no relief. Symptoms include suprapubic pain, urinary frequency and urgency.pain sometimes makes her nauseated. Symptoms wax and wane but never resolve. Pt. Unsure of exacerbating foods or liquids. Denies change in pain with full or empty bladder.      Constitution: Negative for chills and fever.   HENT:  Negative for congestion.    Cardiovascular:  Negative for chest pain, palpitations and sob on exertion.   Respiratory:  Negative for cough and shortness of breath.    Gastrointestinal:  Positive for abdominal pain and nausea. Negative for vomiting, constipation, diarrhea and rectal bleeding.   Genitourinary:  Positive for frequency and urgency. Negative for dysuria, flank pain, vaginal discharge and vaginal bleeding.   Musculoskeletal:  Positive for back pain (intermittent).   Skin:  Negative for rash.   Neurological:  Negative for dizziness, light-headedness, passing out, disorientation and altered mental status.   Psychiatric/Behavioral:  Negative for altered mental status, disorientation and confusion.     Objective:     Physical Exam   Constitutional: She is oriented to person, place, and time. She appears well-developed. She is cooperative.  Non-toxic appearance. She does not appear ill. No distress.   HENT:   Head: Normocephalic and atraumatic.   Ears:   Right Ear: External ear normal.   Left Ear: External ear normal.   Nose: Nose normal.   Mouth/Throat: Oropharynx is clear and moist and mucous membranes are normal. Mucous membranes are moist. Oropharynx is clear.   Eyes: Conjunctivae and " lids are normal. No scleral icterus.   Neck: Trachea normal and phonation normal. Neck supple.   Cardiovascular: Normal rate, regular rhythm, normal heart sounds and normal pulses.   Pulmonary/Chest: Effort normal and breath sounds normal. No accessory muscle usage or stridor. No respiratory distress.   Abdominal: Normal appearance and bowel sounds are normal. She exhibits no distension, no abdominal bruit, no pulsatile midline mass and no mass. Soft. flat abdomen There is no splenomegaly or hepatomegaly. There is abdominal tenderness in the suprapubic area. There is no rebound, no guarding, no tenderness at McBurney's point, negative Avendaño's sign, no left CVA tenderness, negative Rovsing's sign, no right CVA tenderness and negative obturator sign. No hernia.     Musculoskeletal:         General: No deformity.   Neurological: no focal deficit. She is alert and oriented to person, place, and time. She has normal strength and normal reflexes. No sensory deficit.   Skin: Skin is warm, dry, intact and not diaphoretic. Capillary refill takes 2 to 3 seconds.   Psychiatric: Her speech is normal and behavior is normal. Judgment and thought content normal.   Nursing note and vitals reviewed.    Assessment:     1. Suprapubic abdominal pain        Plan:       Suprapubic abdominal pain  -     POCT Urinalysis, Dipstick, Automated, W/O Scope  -     phenazopyridine (PYRIDIUM) 100 MG tablet; Take 1 tablet (100 mg total) by mouth 3 (three) times daily as needed for Pain.  Dispense: 6 tablet; Refill: 0  -     celecoxib (CELEBREX) 200 MG capsule; Take 1 capsule (200 mg total) by mouth 2 (two) times daily as needed for Pain.  Dispense: 14 capsule; Refill: 0  -     Ambulatory referral/consult to Urology  -     CULTURE, URINE    Urinalysis-unremarkable    I have discussed the test results and physical exam findings with the patient. Possible new onset IC. I am giving pyridium and celebrex for symptom control with urology follow up and  urine culture. We discussed symptom monitoring, conservative care methods, medication use, and follow up orders. She verbalized understanding and agreement with the plan of care.

## 2023-05-27 ENCOUNTER — PATIENT MESSAGE (OUTPATIENT)
Dept: FAMILY MEDICINE | Facility: CLINIC | Age: 78
End: 2023-05-27

## 2023-05-29 ENCOUNTER — OFFICE VISIT (OUTPATIENT)
Dept: FAMILY MEDICINE | Facility: CLINIC | Age: 78
End: 2023-05-29
Payer: MEDICARE

## 2023-05-29 ENCOUNTER — LAB VISIT (OUTPATIENT)
Dept: LAB | Facility: HOSPITAL | Age: 78
End: 2023-05-29
Attending: INTERNAL MEDICINE
Payer: MEDICARE

## 2023-05-29 VITALS
HEIGHT: 65 IN | HEART RATE: 86 BPM | BODY MASS INDEX: 23.66 KG/M2 | SYSTOLIC BLOOD PRESSURE: 118 MMHG | DIASTOLIC BLOOD PRESSURE: 70 MMHG | WEIGHT: 142 LBS

## 2023-05-29 DIAGNOSIS — R10.30 LOWER ABDOMINAL PAIN: Primary | ICD-10-CM

## 2023-05-29 DIAGNOSIS — R68.89 FEELING UNWELL: ICD-10-CM

## 2023-05-29 DIAGNOSIS — R10.30 LOWER ABDOMINAL PAIN: ICD-10-CM

## 2023-05-29 DIAGNOSIS — R10.31 RIGHT LOWER QUADRANT PAIN: ICD-10-CM

## 2023-05-29 LAB
ALBUMIN SERPL BCP-MCNC: 4.4 G/DL (ref 3.5–5.2)
ALP SERPL-CCNC: 91 U/L (ref 55–135)
ALT SERPL W/O P-5'-P-CCNC: 31 U/L (ref 10–44)
ANION GAP SERPL CALC-SCNC: 6 MMOL/L (ref 8–16)
AST SERPL-CCNC: 24 U/L (ref 10–40)
BASOPHILS # BLD AUTO: 0.04 K/UL (ref 0–0.2)
BASOPHILS NFR BLD: 0.7 % (ref 0–1.9)
BILIRUB SERPL-MCNC: 0.6 MG/DL (ref 0.1–1)
BILIRUB UR QL STRIP: NEGATIVE
BUN SERPL-MCNC: 19 MG/DL (ref 8–23)
CALCIUM SERPL-MCNC: 9.4 MG/DL (ref 8.7–10.5)
CHLORIDE SERPL-SCNC: 104 MMOL/L (ref 95–110)
CLARITY UR: CLEAR
CO2 SERPL-SCNC: 28 MMOL/L (ref 23–29)
COLOR UR: YELLOW
CREAT SERPL-MCNC: 0.8 MG/DL (ref 0.5–1.4)
DIFFERENTIAL METHOD: ABNORMAL
EOSINOPHIL # BLD AUTO: 0.1 K/UL (ref 0–0.5)
EOSINOPHIL NFR BLD: 1.3 % (ref 0–8)
ERYTHROCYTE [DISTWIDTH] IN BLOOD BY AUTOMATED COUNT: 12.5 % (ref 11.5–14.5)
EST. GFR  (NO RACE VARIABLE): >60 ML/MIN/1.73 M^2
GLUCOSE SERPL-MCNC: 107 MG/DL (ref 70–110)
GLUCOSE UR QL STRIP: NEGATIVE
HCT VFR BLD AUTO: 42.6 % (ref 37–48.5)
HGB BLD-MCNC: 13.9 G/DL (ref 12–16)
HGB UR QL STRIP: NEGATIVE
IMM GRANULOCYTES # BLD AUTO: 0.01 K/UL (ref 0–0.04)
IMM GRANULOCYTES NFR BLD AUTO: 0.2 % (ref 0–0.5)
KETONES UR QL STRIP: NEGATIVE
LEUKOCYTE ESTERASE UR QL STRIP: NEGATIVE
LYMPHOCYTES # BLD AUTO: 2.1 K/UL (ref 1–4.8)
LYMPHOCYTES NFR BLD: 34.3 % (ref 18–48)
MCH RBC QN AUTO: 31.2 PG (ref 27–31)
MCHC RBC AUTO-ENTMCNC: 32.6 G/DL (ref 32–36)
MCV RBC AUTO: 96 FL (ref 82–98)
MONOCYTES # BLD AUTO: 0.7 K/UL (ref 0.3–1)
MONOCYTES NFR BLD: 10.8 % (ref 4–15)
NEUTROPHILS # BLD AUTO: 3.2 K/UL (ref 1.8–7.7)
NEUTROPHILS NFR BLD: 52.7 % (ref 38–73)
NITRITE UR QL STRIP: NEGATIVE
NRBC BLD-RTO: 0 /100 WBC
PH UR STRIP: 6 [PH] (ref 5–8)
PLATELET # BLD AUTO: 277 K/UL (ref 150–450)
PMV BLD AUTO: 9.4 FL (ref 9.2–12.9)
POTASSIUM SERPL-SCNC: 4 MMOL/L (ref 3.5–5.1)
PROT SERPL-MCNC: 7.1 G/DL (ref 6–8.4)
PROT UR QL STRIP: NEGATIVE
RBC # BLD AUTO: 4.46 M/UL (ref 4–5.4)
SODIUM SERPL-SCNC: 138 MMOL/L (ref 136–145)
SP GR UR STRIP: 1.01 (ref 1–1.03)
URN SPEC COLLECT METH UR: NORMAL
UROBILINOGEN UR STRIP-ACNC: NEGATIVE EU/DL
WBC # BLD AUTO: 6.1 K/UL (ref 3.9–12.7)

## 2023-05-29 PROCEDURE — 99214 OFFICE O/P EST MOD 30 MIN: CPT | Mod: S$GLB,,, | Performed by: INTERNAL MEDICINE

## 2023-05-29 PROCEDURE — 81003 URINALYSIS AUTO W/O SCOPE: CPT | Performed by: INTERNAL MEDICINE

## 2023-05-29 PROCEDURE — 85025 COMPLETE CBC W/AUTO DIFF WBC: CPT | Performed by: INTERNAL MEDICINE

## 2023-05-29 PROCEDURE — 99214 PR OFFICE/OUTPT VISIT, EST, LEVL IV, 30-39 MIN: ICD-10-PCS | Mod: S$GLB,,, | Performed by: INTERNAL MEDICINE

## 2023-05-29 PROCEDURE — 3074F PR MOST RECENT SYSTOLIC BLOOD PRESSURE < 130 MM HG: ICD-10-PCS | Mod: CPTII,S$GLB,, | Performed by: INTERNAL MEDICINE

## 2023-05-29 PROCEDURE — 3074F SYST BP LT 130 MM HG: CPT | Mod: CPTII,S$GLB,, | Performed by: INTERNAL MEDICINE

## 2023-05-29 PROCEDURE — 1101F PR PT FALLS ASSESS DOC 0-1 FALLS W/OUT INJ PAST YR: ICD-10-PCS | Mod: CPTII,S$GLB,, | Performed by: INTERNAL MEDICINE

## 2023-05-29 PROCEDURE — 1160F RVW MEDS BY RX/DR IN RCRD: CPT | Mod: CPTII,S$GLB,, | Performed by: INTERNAL MEDICINE

## 2023-05-29 PROCEDURE — 1101F PT FALLS ASSESS-DOCD LE1/YR: CPT | Mod: CPTII,S$GLB,, | Performed by: INTERNAL MEDICINE

## 2023-05-29 PROCEDURE — 3288F FALL RISK ASSESSMENT DOCD: CPT | Mod: CPTII,S$GLB,, | Performed by: INTERNAL MEDICINE

## 2023-05-29 PROCEDURE — 3078F PR MOST RECENT DIASTOLIC BLOOD PRESSURE < 80 MM HG: ICD-10-PCS | Mod: CPTII,S$GLB,, | Performed by: INTERNAL MEDICINE

## 2023-05-29 PROCEDURE — 1159F MED LIST DOCD IN RCRD: CPT | Mod: CPTII,S$GLB,, | Performed by: INTERNAL MEDICINE

## 2023-05-29 PROCEDURE — 3078F DIAST BP <80 MM HG: CPT | Mod: CPTII,S$GLB,, | Performed by: INTERNAL MEDICINE

## 2023-05-29 PROCEDURE — 1125F PR PAIN SEVERITY QUANTIFIED, PAIN PRESENT: ICD-10-PCS | Mod: CPTII,S$GLB,, | Performed by: INTERNAL MEDICINE

## 2023-05-29 PROCEDURE — 36415 COLL VENOUS BLD VENIPUNCTURE: CPT | Performed by: INTERNAL MEDICINE

## 2023-05-29 PROCEDURE — 1125F AMNT PAIN NOTED PAIN PRSNT: CPT | Mod: CPTII,S$GLB,, | Performed by: INTERNAL MEDICINE

## 2023-05-29 PROCEDURE — 1159F PR MEDICATION LIST DOCUMENTED IN MEDICAL RECORD: ICD-10-PCS | Mod: CPTII,S$GLB,, | Performed by: INTERNAL MEDICINE

## 2023-05-29 PROCEDURE — 3288F PR FALLS RISK ASSESSMENT DOCUMENTED: ICD-10-PCS | Mod: CPTII,S$GLB,, | Performed by: INTERNAL MEDICINE

## 2023-05-29 PROCEDURE — 1160F PR REVIEW ALL MEDS BY PRESCRIBER/CLIN PHARMACIST DOCUMENTED: ICD-10-PCS | Mod: CPTII,S$GLB,, | Performed by: INTERNAL MEDICINE

## 2023-05-29 PROCEDURE — 80053 COMPREHEN METABOLIC PANEL: CPT | Performed by: INTERNAL MEDICINE

## 2023-05-29 NOTE — PROGRESS NOTES
Subjective:       Patient ID: Farrah Boston is a 78 y.o. female.    Chief Complaint: Back Pain, Pelvic Pain, and Abdominal Pain    Ms. Farrah Boston is a 78-year-old  female who comes for an early appointment.      Generally she has not been feeling well and feels somewhat run down.  Can not specify.  The most important issue seems to be an anterior abdominal discomfort or cramp or a pressure-like sensation for the last month or so.  It has started insidiously and she can not pinpoint any event or association with any other symptoms.  She also has a week low back pain which has been going on for perhaps months to years.  No     No diarrhea, nausea, vomiting or constipation.  Just feeling unwell.    Initially I got the impression that she had an intact uterus but later on upon clarification she did have a partial hysterectomy.  Status of last gyn examination is uncertain and it she can not get an appointment with a gynecologist within the next 2 weeks.      He does state that her bowel movements are regular.      She has not been thus far diagnosed with kidney stones and she has an intact appendix.      The pain seems to be in the lower quadrants both right and left and also to some extent suprapubic region.  Thus far she has not been diagnosed with hernias.  It is unclear if she had any bowel adhesions in past.  In the epic system I do not have any CT        Back Pain  This is a new problem. The current episode started more than 1 month ago. The problem occurs intermittently. The problem has been waxing and waning since onset. The pain is present in the gluteal. The quality of the pain is described as aching. Radiates to: Unknown if it is affiliated with anterior abdominal lower abdominal pain. The pain is at a severity of 3/10. The pain is mild. The pain is The same all the time. Associated symptoms include abdominal pain. Pertinent negatives include no bowel incontinence, chest pain, dysuria, fever,  headaches, leg pain, tingling or weight loss. Risk factors include lack of exercise and sedentary lifestyle. She has tried bed rest for the symptoms. The treatment provided mild relief.   Abdominal Pain  This is a new problem. The current episode started more than 1 month ago. The onset quality is undetermined. The problem occurs constantly. The problem has been waxing and waning. The pain is located in the LLQ and RLQ (Lower abdomen). The pain is at a severity of 3/10. The pain is mild. The abdominal pain does not radiate. Pertinent negatives include no anorexia, arthralgias, belching, constipation, diarrhea, dysuria, fever, flatus, frequency, headaches, hematochezia, hematuria, melena, myalgias, nausea, vomiting or weight loss. Associated symptoms comments: Simply feeling unwell..     Past Medical History:   Diagnosis Date    Allergy     Anxiety     Depression     GERD (gastroesophageal reflux disease)     H/O colonoscopy 10/6/2022    Need for hepatitis C screening test 2/28/2018    Negative    Osteopenia     Reflux esophagitis     Seasonal allergies      Social History     Socioeconomic History    Marital status:      Spouse name: Mike Boston    Number of children: 3   Occupational History    Occupation: Journal Contributor/Writer     Comment: [employer]   Tobacco Use    Smoking status: Never    Smokeless tobacco: Never   Substance and Sexual Activity    Alcohol use: Yes     Comment: rarely    Drug use: No    Sexual activity: Yes     Partners: Male     Birth control/protection: None     Social Determinants of Health     Financial Resource Strain: Low Risk     Difficulty of Paying Living Expenses: Not very hard   Food Insecurity: No Food Insecurity    Worried About Running Out of Food in the Last Year: Never true    Ran Out of Food in the Last Year: Never true   Transportation Needs: No Transportation Needs    Lack of Transportation (Medical): No    Lack of Transportation (Non-Medical): No    Physical Activity: Inactive    Days of Exercise per Week: 0 days    Minutes of Exercise per Session: 0 min   Stress: No Stress Concern Present    Feeling of Stress : Only a little   Social Connections: Socially Integrated    Frequency of Communication with Friends and Family: More than three times a week    Frequency of Social Gatherings with Friends and Family: More than three times a week    Attends [employer] Services: 1 to 4 times per year    Active Member of Clubs or Organizations: Yes    Attends Club or Organization Meetings: 1 to 4 times per year    Marital Status:    Housing Stability: Low Risk     Unable to Pay for Housing in the Last Year: No    Number of Places Lived in the Last Year: 1    Unstable Housing in the Last Year: No     Past Surgical History:   Procedure Laterality Date    ABDOMINAL ADHESION SURGERY      ANTERIOR VAGINAL REPAIR      BREAST BIOPSY      benign    CHOLECYSTECTOMY      HERNIA REPAIR      HYSTERECTOMY       Family History   Problem Relation Age of Onset    Hypertension Mother     Cirrhosis Father     Breast cancer Maternal Aunt 70    Breast cancer Maternal Aunt 80    Breast cancer Paternal Grandmother 35    Ovarian cancer Neg Hx        Review of Systems   Constitutional:  Positive for activity change and fatigue. Negative for fever, unexpected weight change and weight loss.        Feeling unwell.  Undefined fatigue.   HENT:  Negative for congestion, nosebleeds and sore throat.    Eyes:  Negative for discharge, redness and visual disturbance.   Respiratory:  Negative for apnea, cough, choking, chest tightness and shortness of breath.    Cardiovascular:  Negative for chest pain, palpitations and leg swelling.        Hyperlipidemia.  Better controlled.   Gastrointestinal:  Positive for abdominal pain. Negative for abdominal distention, anorexia, blood in stool, bowel incontinence, constipation, diarrhea, flatus, hematochezia, melena, nausea and vomiting.        Lower abdominal  "discomfort and suprapubic, right and left lower quadrants.   Endocrine: Negative for cold intolerance, polydipsia and polyuria.   Genitourinary:  Negative for dysuria, frequency and hematuria.   Musculoskeletal:  Negative for arthralgias, gait problem, joint swelling and myalgias.   Skin:  Positive for pallor. Negative for color change and rash.   Neurological:  Negative for tingling and headaches.   Hematological:  Negative for adenopathy. Does not bruise/bleed easily.   Psychiatric/Behavioral:  Negative for agitation, confusion and decreased concentration. The patient is nervous/anxious.        Objective:      Blood pressure 118/70, pulse 86, height 5' 5" (1.651 m), weight 64.4 kg (142 lb). Body mass index is 23.63 kg/m².  Physical Exam  Constitutional:       General: She is not in acute distress.     Appearance: She is well-developed. She is not ill-appearing or diaphoretic.      Comments: BMI is 22.60   Eyes:      General: No scleral icterus.        Right eye: No discharge.         Left eye: No discharge.   Neck:      Thyroid: No thyromegaly.      Trachea: No tracheal deviation.   Cardiovascular:      Rate and Rhythm: Normal rate and regular rhythm.   Pulmonary:      Effort: Pulmonary effort is normal.      Breath sounds: Normal breath sounds.   Abdominal:      General: There is no distension.      Palpations: Abdomen is soft.      Tenderness: There is no abdominal tenderness.   Musculoskeletal:         General: No deformity.      Lumbar back: No swelling, edema, deformity, tenderness or bony tenderness. Normal range of motion.      Right lower leg: No edema.      Left lower leg: No edema.   Skin:     Coloration: Skin is not pale.      Findings: No erythema or rash.   Neurological:      Mental Status: She is alert. Mental status is at baseline.      Motor: No tremor or atrophy.   Psychiatric:         Mood and Affect: Mood is elated.         Behavior: Behavior normal.         Thought Content: Thought content " normal.      Comments: Slightly anxious.         Assessment:               Lower abdominal pain  -     CBC Auto Differential; Future; Expected date: 05/29/2023  -     CT Abdomen Pelvis With Contrast; Future; Expected date: 06/05/2023  -     Urinalysis; Future; Expected date: 05/29/2023    Feeling unwell  -     CBC Auto Differential; Future; Expected date: 05/29/2023  -     Comprehensive Metabolic Panel; Future; Expected date: 05/29/2023  -     CT Abdomen Pelvis With Contrast; Future; Expected date: 06/05/2023    Right lower quadrant pain  -     CBC Auto Differential; Future; Expected date: 05/29/2023  -     CT Abdomen Pelvis With Contrast; Future; Expected date: 06/05/2023  -     Urinalysis; Future; Expected date: 05/29/2023       Office Visit on 05/23/2023   Component Date Value Ref Range Status    POC Blood, Urine 05/23/2023 Negative  Negative Final    POC Bilirubin, Urine 05/23/2023 Negative  Negative Final    POC Urobilinogen, Urine 05/23/2023 normal  0.1 - 1.1 Final    POC Ketones, Urine 05/23/2023 Negative  Negative Final    POC Protein, Urine 05/23/2023 Negative  Negative Final    POC Nitrates, Urine 05/23/2023 Negative  Negative Final    POC Glucose, Urine 05/23/2023 Negative  Negative Final    pH, UA 05/23/2023 6.0   Final    POC Specific Gravity, Urine 05/23/2023 1.005  1.003 - 1.029 Final    POC Leukocytes, Urine 05/23/2023 Negative  Negative Final   Lab Visit on 04/28/2023   Component Date Value Ref Range Status    Cholesterol 04/28/2023 137  120 - 199 mg/dL Final    Triglycerides 04/28/2023 73  30 - 150 mg/dL Final    HDL 04/28/2023 42  40 - 75 mg/dL Final    LDL Cholesterol 04/28/2023 80.4  63.0 - 159.0 mg/dL Final    HDL/Cholesterol Ratio 04/28/2023 30.7  20.0 - 50.0 % Final    Total Cholesterol/HDL Ratio 04/28/2023 3.3  2.0 - 5.0 Final    Non-HDL Cholesterol 04/28/2023 95  mg/dL Final    Sodium 04/28/2023 140  136 - 145 mmol/L Final    Potassium 04/28/2023 4.1  3.5 - 5.1 mmol/L Final    Chloride  04/28/2023 106  95 - 110 mmol/L Final    CO2 04/28/2023 27  23 - 29 mmol/L Final    Glucose 04/28/2023 87  70 - 110 mg/dL Final    BUN 04/28/2023 15  8 - 23 mg/dL Final    Creatinine 04/28/2023 0.6  0.5 - 1.4 mg/dL Final    Calcium 04/28/2023 9.4  8.7 - 10.5 mg/dL Final    Anion Gap 04/28/2023 7 (L)  8 - 16 mmol/L Final    eGFR 04/28/2023 >60.0  >60 mL/min/1.73 m^2 Final    ALT 04/28/2023 35  10 - 44 U/L Final    TSH 04/28/2023 8.140 (H)  0.340 - 5.600 uIU/mL Final    Free T4 04/28/2023 0.66 (L)  0.71 - 1.51 ng/dL Final         Plan:           Lower abdominal pain  -     CBC Auto Differential; Future; Expected date: 05/29/2023  -     CT Abdomen Pelvis With Contrast; Future; Expected date: 06/05/2023  -     Urinalysis; Future; Expected date: 05/29/2023    Feeling unwell  -     CBC Auto Differential; Future; Expected date: 05/29/2023  -     Comprehensive Metabolic Panel; Future; Expected date: 05/29/2023  -     CT Abdomen Pelvis With Contrast; Future; Expected date: 06/05/2023    Right lower quadrant pain  -     CBC Auto Differential; Future; Expected date: 05/29/2023  -     CT Abdomen Pelvis With Contrast; Future; Expected date: 06/05/2023  -     Urinalysis; Future; Expected date: 05/29/2023      Patient presents with a unusual combination of lower abdominal discomfort, low back pain.  No other significant clues.      History of partial hysterectomy.      She also feels unwell and looks a little bit pale.      The next best course of action is to order a CT scan of abdomen and pelvis with contrast.          1.-surgical adhesions-hysterectomy in past.    2.-diverticulosis or diverticulitis/colitis  3.-appendicitis though less likely with the chronic setting  4.-unlikely to be ovarian mass with bilateral pelvic pain.  5.-simply probably constipation.  6.-less likely kidney stones and at the same time bilateral.    Advised her to increase some fiber in diet.      She will be notified about lab results and  urinalysis.    Follow-up based upon results of initial investigations at her situation.  If the pain gets worse in the next 24-48 hours she should go to the emergency room.  She did feel somewhat queasy after my examination.    Lab tests have been reviewed and the blood counts are in good range.  She looked as little bit pale.  General chemistry is also good with good renal function.  Waiting for the urine test.  Message sent to the patient.      Current Outpatient Medications:     atorvastatin (LIPITOR) 10 MG tablet, TAKE 1 TABLET EVERY DAY, Disp: 90 tablet, Rfl: 3    calcium-vitamin D3 (OS-LUIS 500 + D3) 500 mg(1,250mg) -200 unit per tablet, Take 1 tablet by mouth 2 (two) times daily with meals., Disp: , Rfl:     conjugated estrogens (PREMARIN) vaginal cream, Place 1 g vaginally twice a week., Disp: 45 g, Rfl: 1    coQ10, ubiquinol, 200 mg Cap, Take 1 tablet by mouth Daily., Disp: , Rfl:     magnesium 200 mg Tab, Take 400 mg by mouth once., Disp: , Rfl:     metoprolol succinate (TOPROL-XL) 25 MG 24 hr tablet, TAKE 1 TABLET (25 MG TOTAL) BY MOUTH ONCE DAILY., Disp: 90 tablet, Rfl: 3    multivit-min/iron/folic/lutein (CENTRUM SILVER WOMEN ORAL), Take 1 tablet by mouth Daily., Disp: , Rfl:     polyethylene glycol 3350 (MIRALAX ORAL), Take by mouth. Take 3/4 capful by mouth nightly as needed, Disp: , Rfl:

## 2023-05-31 LAB
BACTERIA UR CULT: NORMAL
BACTERIA UR CULT: NORMAL

## 2023-06-01 ENCOUNTER — HOSPITAL ENCOUNTER (OUTPATIENT)
Dept: RADIOLOGY | Facility: HOSPITAL | Age: 78
Discharge: HOME | End: 2023-06-01
Attending: INTERNAL MEDICINE
Payer: MEDICARE

## 2023-06-01 ENCOUNTER — PATIENT MESSAGE (OUTPATIENT)
Dept: OBSTETRICS AND GYNECOLOGY | Facility: CLINIC | Age: 78
End: 2023-06-01
Payer: MEDICARE

## 2023-06-01 DIAGNOSIS — R10.30 LOWER ABDOMINAL PAIN: ICD-10-CM

## 2023-06-01 DIAGNOSIS — R68.89 FEELING UNWELL: ICD-10-CM

## 2023-06-01 DIAGNOSIS — R10.31 RIGHT LOWER QUADRANT PAIN: ICD-10-CM

## 2023-06-01 PROCEDURE — 74177 CT ABD & PELVIS W/CONTRAST: CPT | Mod: TC

## 2023-06-01 PROCEDURE — 25500020 PHARM REV CODE 255: Performed by: INTERNAL MEDICINE

## 2023-06-01 RX ADMIN — IOHEXOL 100 ML: 350 INJECTION, SOLUTION INTRAVENOUS at 08:06

## 2023-06-06 ENCOUNTER — PATIENT MESSAGE (OUTPATIENT)
Dept: CARDIOLOGY | Facility: CLINIC | Age: 78
End: 2023-06-06
Payer: MEDICARE

## 2023-06-21 ENCOUNTER — PATIENT MESSAGE (OUTPATIENT)
Dept: FAMILY MEDICINE | Facility: CLINIC | Age: 78
End: 2023-06-21

## 2023-06-21 DIAGNOSIS — Z13.820 ENCOUNTER FOR OSTEOPOROSIS SCREENING IN ASYMPTOMATIC POSTMENOPAUSAL PATIENT: Primary | ICD-10-CM

## 2023-06-21 DIAGNOSIS — Z78.0 ENCOUNTER FOR OSTEOPOROSIS SCREENING IN ASYMPTOMATIC POSTMENOPAUSAL PATIENT: Primary | ICD-10-CM

## 2023-06-22 ENCOUNTER — TELEPHONE (OUTPATIENT)
Dept: URGENT CARE | Facility: CLINIC | Age: 78
End: 2023-06-22
Payer: MEDICARE

## 2023-06-22 NOTE — TELEPHONE ENCOUNTER
----- Message from Kinjal Lawler NP sent at 6/3/2023  4:58 PM CDT -----  Please inform urine culture grew bacteria normally found in the female genital area.  This is most likely due to a contaminated clean catch urine specimen collection.  If symptoms persist, please return for further evaluation or f/u with PCP.

## 2023-06-22 NOTE — TELEPHONE ENCOUNTER
The order for bone density has been sent.  [facility] will call you typically within a month for an appointment.    Best regards     Dr. Yani MABRY       ===View-only below this line===      ----- Message -----       From:Farrah Boston       Sent:6/21/2023  2:32 PM CDT         To:Jun Lomeli MD    Subject:Bone density test and pelvic pain update    Hi Dr. Lomeli,  I think I am past due for a bone density scan. If I am due for the scan, could you please order it for me?  Thanks.   Re: pelvic pain update:. Over the past several weeks,  the discomfort has largely subsided.   Thank you for your thoroughness in caring for this problem. You are the best!!  God bless you!  Farrah

## 2023-06-27 ENCOUNTER — HOSPITAL ENCOUNTER (OUTPATIENT)
Dept: RADIOLOGY | Facility: HOSPITAL | Age: 78
Discharge: HOME | End: 2023-06-27
Attending: INTERNAL MEDICINE
Payer: MEDICARE

## 2023-06-27 DIAGNOSIS — Z78.0 ENCOUNTER FOR OSTEOPOROSIS SCREENING IN ASYMPTOMATIC POSTMENOPAUSAL PATIENT: ICD-10-CM

## 2023-06-27 DIAGNOSIS — Z13.820 ENCOUNTER FOR OSTEOPOROSIS SCREENING IN ASYMPTOMATIC POSTMENOPAUSAL PATIENT: ICD-10-CM

## 2023-06-27 PROCEDURE — 77080 DXA BONE DENSITY AXIAL: CPT | Mod: TC,PO

## 2023-07-01 ENCOUNTER — PATIENT MESSAGE (OUTPATIENT)
Dept: CARDIOLOGY | Facility: CLINIC | Age: 78
End: 2023-07-01
Payer: MEDICARE

## 2023-08-09 ENCOUNTER — OFFICE VISIT (OUTPATIENT)
Dept: CARDIOLOGY | Facility: CLINIC | Age: 78
End: 2023-08-09
Payer: MEDICARE

## 2023-08-09 VITALS
OXYGEN SATURATION: 97 % | RESPIRATION RATE: 16 BRPM | SYSTOLIC BLOOD PRESSURE: 134 MMHG | HEIGHT: 65 IN | WEIGHT: 143 LBS | HEART RATE: 83 BPM | BODY MASS INDEX: 23.82 KG/M2 | DIASTOLIC BLOOD PRESSURE: 72 MMHG

## 2023-08-09 DIAGNOSIS — F41.8 MIXED ANXIETY DEPRESSIVE DISORDER: ICD-10-CM

## 2023-08-09 DIAGNOSIS — I10 ESSENTIAL HYPERTENSION: ICD-10-CM

## 2023-08-09 DIAGNOSIS — E78.5 DYSLIPIDEMIA: ICD-10-CM

## 2023-08-09 DIAGNOSIS — K21.9 GASTROESOPHAGEAL REFLUX DISEASE WITHOUT ESOPHAGITIS: ICD-10-CM

## 2023-08-09 PROCEDURE — 3288F PR FALLS RISK ASSESSMENT DOCUMENTED: ICD-10-PCS | Mod: CPTII,S$GLB,, | Performed by: INTERNAL MEDICINE

## 2023-08-09 PROCEDURE — 1126F PR PAIN SEVERITY QUANTIFIED, NO PAIN PRESENT: ICD-10-PCS | Mod: CPTII,S$GLB,, | Performed by: INTERNAL MEDICINE

## 2023-08-09 PROCEDURE — 1160F RVW MEDS BY RX/DR IN RCRD: CPT | Mod: CPTII,S$GLB,, | Performed by: INTERNAL MEDICINE

## 2023-08-09 PROCEDURE — 3075F SYST BP GE 130 - 139MM HG: CPT | Mod: CPTII,S$GLB,, | Performed by: INTERNAL MEDICINE

## 2023-08-09 PROCEDURE — 1160F PR REVIEW ALL MEDS BY PRESCRIBER/CLIN PHARMACIST DOCUMENTED: ICD-10-PCS | Mod: CPTII,S$GLB,, | Performed by: INTERNAL MEDICINE

## 2023-08-09 PROCEDURE — 1159F PR MEDICATION LIST DOCUMENTED IN MEDICAL RECORD: ICD-10-PCS | Mod: CPTII,S$GLB,, | Performed by: INTERNAL MEDICINE

## 2023-08-09 PROCEDURE — 1159F MED LIST DOCD IN RCRD: CPT | Mod: CPTII,S$GLB,, | Performed by: INTERNAL MEDICINE

## 2023-08-09 PROCEDURE — 3078F DIAST BP <80 MM HG: CPT | Mod: CPTII,S$GLB,, | Performed by: INTERNAL MEDICINE

## 2023-08-09 PROCEDURE — 1101F PT FALLS ASSESS-DOCD LE1/YR: CPT | Mod: CPTII,S$GLB,, | Performed by: INTERNAL MEDICINE

## 2023-08-09 PROCEDURE — 1126F AMNT PAIN NOTED NONE PRSNT: CPT | Mod: CPTII,S$GLB,, | Performed by: INTERNAL MEDICINE

## 2023-08-09 PROCEDURE — 3288F FALL RISK ASSESSMENT DOCD: CPT | Mod: CPTII,S$GLB,, | Performed by: INTERNAL MEDICINE

## 2023-08-09 PROCEDURE — 3078F PR MOST RECENT DIASTOLIC BLOOD PRESSURE < 80 MM HG: ICD-10-PCS | Mod: CPTII,S$GLB,, | Performed by: INTERNAL MEDICINE

## 2023-08-09 PROCEDURE — 99213 OFFICE O/P EST LOW 20 MIN: CPT | Mod: S$GLB,,, | Performed by: INTERNAL MEDICINE

## 2023-08-09 PROCEDURE — 3075F PR MOST RECENT SYSTOLIC BLOOD PRESS GE 130-139MM HG: ICD-10-PCS | Mod: CPTII,S$GLB,, | Performed by: INTERNAL MEDICINE

## 2023-08-09 PROCEDURE — 1101F PR PT FALLS ASSESS DOC 0-1 FALLS W/OUT INJ PAST YR: ICD-10-PCS | Mod: CPTII,S$GLB,, | Performed by: INTERNAL MEDICINE

## 2023-08-09 PROCEDURE — 99213 PR OFFICE/OUTPT VISIT, EST, LEVL III, 20-29 MIN: ICD-10-PCS | Mod: S$GLB,,, | Performed by: INTERNAL MEDICINE

## 2023-08-09 NOTE — ASSESSMENT & PLAN NOTE
Blood pressure is stable at 130 4/72 continue on metoprolol succinate 25 mg once a day as well as maintaining on low-salt diet.

## 2023-08-09 NOTE — PROGRESS NOTES
" Subjective:    Patient ID:  Farrah Boston is a 78 y.o. female patient here for evaluation Follow-up      History of Present Illness:     Ms. Frazier is here for follow-up evaluation and she is noted to have transient episodes of dizziness lasts only few seconds and subsided spontaneously.  This occurs happened 1 occasion while she was driving she does have intermittent episodes of palpitations.  Denies having any chest discomfort arm neck or jaw pain and no sustained palpitations no swelling in the lower extremities no cough or congestion no fevers chills        Review of patient's allergies indicates:   Allergen Reactions    Morphine Shortness Of Breath     Patient states her" breathing stops"    Codeine Nausea And Vomiting       Past Medical History:   Diagnosis Date    Allergy     Anxiety     Depression     GERD (gastroesophageal reflux disease)     H/O colonoscopy 10/6/2022    Need for hepatitis C screening test 2/28/2018    Negative    Osteopenia     Reflux esophagitis     Seasonal allergies      Past Surgical History:   Procedure Laterality Date    ABDOMINAL ADHESION SURGERY      ANTERIOR VAGINAL REPAIR      BREAST BIOPSY      benign    CHOLECYSTECTOMY      HERNIA REPAIR      HYSTERECTOMY       Social History     Tobacco Use    Smoking status: Never    Smokeless tobacco: Never   Substance Use Topics    Alcohol use: Yes     Comment: rarely    Drug use: No        Review of Systems:    As noted in HPI in addition      REVIEW OF SYSTEMS  CARDIOVASCULAR: No recent chest pain, palpitations, arm, neck, or jaw pain  RESPIRATORY: No recent fever, cough chills, SOB or congestion  : No blood in the urine  GI: No Nausea, vomiting, constipation, diarrhea, blood, or reflux.  MUSCULOSKELETAL: No myalgias  NEURO: No lightheadedness transient episodes of dizziness described above   EYES: No Double vision, blurry, vision or headache              Objective        Vitals:    08/09/23 1426   BP: 134/72   Pulse: 83   Resp: 16 "       LIPIDS - LAST 2   Lab Results   Component Value Date    CHOL 137 04/28/2023    CHOL 149 04/25/2022    HDL 42 04/28/2023    HDL 43 04/25/2022    LDLCALC 80.4 04/28/2023    LDLCALC 84.4 04/25/2022    TRIG 73 04/28/2023    TRIG 108 04/25/2022    CHOLHDL 30.7 04/28/2023    CHOLHDL 28.9 04/25/2022       CBC - LAST 2  Lab Results   Component Value Date    WBC 6.10 05/29/2023    WBC 5.95 01/11/2022    RBC 4.46 05/29/2023    RBC 5.04 01/11/2022    HGB 13.9 05/29/2023    HGB 15.6 01/11/2022    HCT 42.6 05/29/2023    HCT 46.8 01/11/2022    MCV 96 05/29/2023    MCV 93 01/11/2022    MCH 31.2 (H) 05/29/2023    MCH 31.0 01/11/2022    MCHC 32.6 05/29/2023    MCHC 33.3 01/11/2022    RDW 12.5 05/29/2023    RDW 12.4 01/11/2022     05/29/2023     01/11/2022    MPV 9.4 05/29/2023    MPV 9.7 01/11/2022    GRAN 3.2 05/29/2023    GRAN 52.7 05/29/2023    LYMPH 2.1 05/29/2023    LYMPH 34.3 05/29/2023    MONO 0.7 05/29/2023    MONO 10.8 05/29/2023    BASO 0.04 05/29/2023    BASO 0.03 01/11/2022    NRBC 0 05/29/2023    NRBC 0 01/11/2022       CHEMISTRY & LIVER FUNCTION - LAST 2  Lab Results   Component Value Date     05/29/2023     04/28/2023    K 4.0 05/29/2023    K 4.1 04/28/2023     05/29/2023     04/28/2023    CO2 28 05/29/2023    CO2 27 04/28/2023    ANIONGAP 6 (L) 05/29/2023    ANIONGAP 7 (L) 04/28/2023    BUN 19 05/29/2023    BUN 15 04/28/2023    CREATININE 0.8 05/29/2023    CREATININE 0.6 04/28/2023     05/29/2023    GLU 87 04/28/2023    CALCIUM 9.4 05/29/2023    CALCIUM 9.4 04/28/2023    MG 2.5 (H) 03/01/2005    ALBUMIN 4.4 05/29/2023    ALBUMIN 4.3 04/25/2022    PROT 7.1 05/29/2023    PROT 7.1 04/25/2022    ALKPHOS 91 05/29/2023    ALKPHOS 86 04/25/2022    ALT 31 05/29/2023    ALT 35 04/28/2023    AST 24 05/29/2023    AST 23 04/25/2022    BILITOT 0.6 05/29/2023    BILITOT 0.7 04/25/2022        CARDIAC PROFILE - LAST 2  Lab Results   Component Value Date    BNP 20 01/11/2022     TROPONINI <0.030 01/11/2022    TROPONINI <0.030 01/11/2022        COAGULATION - LAST 2  Lab Results   Component Value Date    LABPT 12.5 01/11/2022    INR 1.0 01/11/2022    INR 1.0 06/19/2009    APTT 27.4 06/19/2009       ENDOCRINE & PSA - LAST 2  Lab Results   Component Value Date    HGBA1C 5.5 01/11/2022    TSH 8.140 (H) 04/28/2023    TSH 5.360 06/14/2021        ECHOCARDIOGRAM RESULTS  No results found for this or any previous visit.      CURRENT/PREVIOUS VISIT EKG  Results for orders placed or performed during the hospital encounter of 01/11/22   EKG 12-lead    Collection Time: 01/11/22 11:23 AM    Narrative    Test Reason : R07.9,    Vent. Rate : 079 BPM     Atrial Rate : 079 BPM     P-R Int : 152 ms          QRS Dur : 080 ms      QT Int : 402 ms       P-R-T Axes : 051 -15 055 degrees     QTc Int : 460 ms    Normal sinus rhythm  Normal ECG  When compared with ECG of 23-JUN-2009 14:36,  No significant change was found  Confirmed by Reece Bowen MD ([id number]) on 1/15/2022 5:55:37 PM    Referred By: [email or web address]   SELF           Confirmed By:Reece Bowen MD     No valid procedures specified.   Results for orders placed during the hospital encounter of 01/11/22    Nuclear Stress Test    Interpretation Summary    The EKG portion of this study is negative for ischemia.    The patient reported no chest pain during the stress test.    There were no arrhythmias during stress.    The nuclear portion of this study will be reported separately.    No valid procedures specified.    PHYSICAL EXAM  CONSTITUTIONAL: Well built, well nourished in no apparent distress  NECK: no carotid bruit, no JVD  LUNGS: CTA  CHEST WALL: no tenderness  HEART: regular rate and rhythm, S1, S2 normal, no murmur, click, rub or gallop   ABDOMEN: soft, non-tender; bowel sounds normal; no masses,  no organomegaly  EXTREMITIES: Extremities normal, no edema, no calf tenderness noted  NEURO: AAO X 3    I HAVE REVIEWED :    The vital signs, nurses notes, and  all the pertinent radiology and labs.        Current Outpatient Medications   Medication Instructions    atorvastatin (LIPITOR) 10 MG tablet TAKE 1 TABLET EVERY DAY    calcium-vitamin D3 (OS-LUIS 500 + D3) 500 mg(1,250mg) -200 unit per tablet 1 tablet, Oral, 2 times daily with meals    conjugated estrogens (PREMARIN) 1 g, Vaginal, Twice weekly    coQ10, ubiquinol, 200 mg Cap 1 tablet, Oral, Daily    magnesium 400 mg, Oral, Once    metoprolol succinate (TOPROL-XL) 25 mg, Oral, Daily    multivit-min/iron/folic/lutein (CENTRUM SILVER WOMEN ORAL) 1 tablet, Oral, Daily    polyethylene glycol 3350 (MIRALAX ORAL) Oral, Take 3/4 capful by mouth nightly as needed          Assessment & Plan     Essential hypertension  Blood pressure is stable at 130 4/72 continue on metoprolol succinate 25 mg once a day as well as maintaining on low-salt diet.    Dyslipidemia  Continue on Lipitor 10 mg daily maintain low-fat low-cholesterol diet    Gastroesophageal reflux disease without esophagitis  She is symptoms of GE reflux and this seemed to be also controlled reasonably well.    Mixed anxiety depressive disorder  Much better overall.  No intervention needed at this time          Follow up in about 6 months (around 2/9/2024).

## 2023-08-11 RX ORDER — ATORVASTATIN CALCIUM 10 MG/1
TABLET, FILM COATED ORAL
Qty: 90 TABLET | Refills: 3 | Status: SHIPPED | OUTPATIENT
Start: 2023-08-11

## 2023-08-13 ENCOUNTER — PATIENT MESSAGE (OUTPATIENT)
Dept: FAMILY MEDICINE | Facility: CLINIC | Age: 78
End: 2023-08-13

## 2023-08-13 ENCOUNTER — NURSE TRIAGE (OUTPATIENT)
Dept: ADMINISTRATIVE | Facility: CLINIC | Age: 78
End: 2023-08-13
Payer: MEDICARE

## 2023-08-13 DIAGNOSIS — U07.1 COVID-19: Primary | ICD-10-CM

## 2023-08-14 ENCOUNTER — PATIENT MESSAGE (OUTPATIENT)
Dept: FAMILY MEDICINE | Facility: CLINIC | Age: 78
End: 2023-08-14

## 2023-08-14 DIAGNOSIS — U07.1 COVID-19: Primary | ICD-10-CM

## 2023-08-14 NOTE — TELEPHONE ENCOUNTER
Reason for Disposition   [1] HIGH RISK patient (e.g., weak immune system, age > 64 years, obesity with BMI 30 or higher, pregnant, chronic lung disease or other chronic medical condition) AND [2] COVID symptoms (e.g., cough, fever)  (Exceptions: Already seen by PCP and no new or worsening symptoms.)    Additional Information   Negative: SEVERE difficulty breathing (e.g., struggling for each breath, speaks in single words)   Negative: Difficult to awaken or acting confused (e.g., disoriented, slurred speech)   Negative: Bluish (or gray) lips or face now   Negative: Shock suspected (e.g., cold/pale/clammy skin, too weak to stand, low BP, rapid pulse)   Negative: Sounds like a life-threatening emergency to the triager   Negative: [1] Diagnosed or suspected COVID-19 AND [2] symptoms lasting 3 or more weeks   Negative: [1] COVID-19 exposure AND [2] no symptoms   Negative: COVID-19 vaccine reaction suspected (e.g., fever, headache, muscle aches) occurring 1 to 3 days after getting vaccine   Negative: COVID-19 vaccine, questions about   Negative: [1] Lives with someone known to have influenza (flu test positive) AND [2] flu-like symptoms (e.g., cough, runny nose, sore throat, SOB; with or without fever)   Negative: [1] Possible COVID-19 symptoms AND [2] triager concerned about severity of symptoms or other causes   Negative: COVID-19 and breastfeeding, questions about   Negative: SEVERE or constant chest pain or pressure  (Exception: Mild central chest pain, present only when coughing.)   Negative: MODERATE difficulty breathing (e.g., speaks in phrases, SOB even at rest, pulse 100-120)   Negative: [1] Headache AND [2] stiff neck (can't touch chin to chest)   Negative: Oxygen level (e.g., pulse oximetry) 90 percent or lower   Negative: Chest pain or pressure  (Exception: MILD central chest pain, present only when coughing.)   Negative: [1] Drinking very little AND [2] dehydration suspected (e.g., no urine > 12 hours, very  dry mouth, very lightheaded)   Negative: Patient sounds very sick or weak to the triager   Negative: MILD difficulty breathing (e.g., minimal/no SOB at rest, SOB with walking, pulse <100)   Negative: Fever > 103 F (39.4 C)   Negative: [1] Fever > 101 F (38.3 C) AND [2] age > 60 years   Negative: [1] Fever > 100.0 F (37.8 C) AND [2] bedridden (e.g., CVA, chronic illness, recovering from surgery)   Negative: Oxygen level (e.g., pulse oximetry) 91 to 94 percent    Protocols used: Coronavirus (COVID-19) Diagnosed or [id number]-AT  Pt states she just tested pos for covid . spouse tested pos for covid thurs and is already on paxlovid. Pt states she just tested to make sure she was ok to go to [place] with her 100 yo mom. Pts mom neg. pt states her only sx is scratchy throat, afeb. no SOB. O2 sat 97%. Rec to speak with dr within 24 hours. Pt agrees. Pt asking for paxlovid from MD on call. Pt warm transferred to speak with PCP answering service.

## 2023-08-14 NOTE — PROGRESS NOTES
Ms. Boston is a 77 yo patient of Dr. Lomeli who called on call service on 8/13/2023 at 1940 due to positive Covid test. Her  was prescribed Paxlovid in the urgent care setting and she is requesting the same. Her kidney function according to her most recent labs was normal. She is currently on a Statin which causes DDI with Paxlovid. She had last dose today. Safer medication alternative is molnupurivir. I explained that it is just as effective but safer and I will send to [facility] [address]. 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 I will also prescribe due to her age and her risk for complications. I explained that she should call the clinic in the event she becomes symptomatic or report to ED.

## 2023-08-14 NOTE — TELEPHONE ENCOUNTER
Called and spoke with Mrs. Yan. She is starting not to feel as great, she has a scratchy throat. I spoke with Dr. Bowen, he does not want her to take the covid medication. She is wanting to know if she could hold her statin medication so she can take the plaxovid.

## 2023-08-14 NOTE — PROGRESS NOTES
Patient has COVID.  I did check that somebody has already called her Molnupiravir for COVID.    COVID-19  -     nirmatrelvir-ritonavir 300 mg (150 mg x 2)-100 mg copackaged tablets (EUA); Take 3 tablets by mouth 2 (two) times daily for 5 days. Each dose contains 2 nirmatrelvir (pink tablets) and 1 ritonavir (white tablet). Take all 3 tablets together.  Hold atorvastatin for the duration of treatment.  Dispense: 30 tablet; Refill: 0     Prescription has been sent for Paxlovid.  Please hold the atorvastatin for the duration of treatment.  There might be a drug reaction with atorvastatin.      Patient prefers Paxlovid instead of the previous medication.

## 2023-08-27 ENCOUNTER — HOSPITAL ENCOUNTER (EMERGENCY)
Facility: HOSPITAL | Age: 78
Discharge: HOME | End: 2023-08-28
Attending: STUDENT IN AN ORGANIZED HEALTH CARE EDUCATION/TRAINING PROGRAM
Payer: MEDICARE

## 2023-08-27 VITALS
OXYGEN SATURATION: 97 % | HEART RATE: 65 BPM | SYSTOLIC BLOOD PRESSURE: 134 MMHG | RESPIRATION RATE: 18 BRPM | BODY MASS INDEX: 22.5 KG/M2 | TEMPERATURE: 98 F | HEIGHT: 66 IN | DIASTOLIC BLOOD PRESSURE: 61 MMHG | WEIGHT: 140 LBS

## 2023-08-27 DIAGNOSIS — R07.9 CHEST PAIN: ICD-10-CM

## 2023-08-27 DIAGNOSIS — R07.9 CHEST PAIN, UNSPECIFIED TYPE: Primary | ICD-10-CM

## 2023-08-27 DIAGNOSIS — R07.89 ATYPICAL CHEST PAIN: ICD-10-CM

## 2023-08-27 LAB
ALBUMIN SERPL BCP-MCNC: 4.1 G/DL (ref 3.5–5.2)
ALP SERPL-CCNC: 105 U/L (ref 55–135)
ALT SERPL W/O P-5'-P-CCNC: 31 U/L (ref 10–44)
ANION GAP SERPL CALC-SCNC: 12 MMOL/L (ref 8–16)
AST SERPL-CCNC: 20 U/L (ref 10–40)
BASOPHILS # BLD AUTO: 0.03 K/UL (ref 0–0.2)
BASOPHILS NFR BLD: 0.6 % (ref 0–1.9)
BILIRUB SERPL-MCNC: 0.3 MG/DL (ref 0.1–1)
BNP SERPL-MCNC: 20 PG/ML (ref 0–99)
BUN SERPL-MCNC: 17 MG/DL (ref 8–23)
CALCIUM SERPL-MCNC: 9 MG/DL (ref 8.7–10.5)
CHLORIDE SERPL-SCNC: 105 MMOL/L (ref 95–110)
CO2 SERPL-SCNC: 22 MMOL/L (ref 23–29)
CREAT SERPL-MCNC: 0.9 MG/DL (ref 0.5–1.4)
D DIMER PPP IA.FEU-MCNC: 0.38 MG/L FEU
DIFFERENTIAL METHOD: NORMAL
EOSINOPHIL # BLD AUTO: 0.2 K/UL (ref 0–0.5)
EOSINOPHIL NFR BLD: 3.4 % (ref 0–8)
ERYTHROCYTE [DISTWIDTH] IN BLOOD BY AUTOMATED COUNT: 12.3 % (ref 11.5–14.5)
EST. GFR  (NO RACE VARIABLE): >60 ML/MIN/1.73 M^2
GLUCOSE SERPL-MCNC: 119 MG/DL (ref 70–110)
HCT VFR BLD AUTO: 41.1 % (ref 37–48.5)
HGB BLD-MCNC: 13.2 G/DL (ref 12–16)
IMM GRANULOCYTES # BLD AUTO: 0.01 K/UL (ref 0–0.04)
IMM GRANULOCYTES NFR BLD AUTO: 0.2 % (ref 0–0.5)
LYMPHOCYTES # BLD AUTO: 2.1 K/UL (ref 1–4.8)
LYMPHOCYTES NFR BLD: 39.4 % (ref 18–48)
MCH RBC QN AUTO: 29.5 PG (ref 27–31)
MCHC RBC AUTO-ENTMCNC: 32.1 G/DL (ref 32–36)
MCV RBC AUTO: 92 FL (ref 82–98)
MONOCYTES # BLD AUTO: 0.5 K/UL (ref 0.3–1)
MONOCYTES NFR BLD: 10.1 % (ref 4–15)
NEUTROPHILS # BLD AUTO: 2.5 K/UL (ref 1.8–7.7)
NEUTROPHILS NFR BLD: 46.3 % (ref 38–73)
NRBC BLD-RTO: 0 /100 WBC
PLATELET # BLD AUTO: 291 K/UL (ref 150–450)
PMV BLD AUTO: 9.3 FL (ref 9.2–12.9)
POTASSIUM SERPL-SCNC: 4.1 MMOL/L (ref 3.5–5.1)
PROT SERPL-MCNC: 6.9 G/DL (ref 6–8.4)
RBC # BLD AUTO: 4.47 M/UL (ref 4–5.4)
SODIUM SERPL-SCNC: 139 MMOL/L (ref 136–145)
TROPONIN I SERPL DL<=0.01 NG/ML-MCNC: <0.006 NG/ML (ref 0–0.03)
WBC # BLD AUTO: 5.36 K/UL (ref 3.9–12.7)

## 2023-08-27 PROCEDURE — 93010 EKG 12-LEAD: ICD-10-PCS | Mod: ,,, | Performed by: SPECIALIST

## 2023-08-27 PROCEDURE — 99285 EMERGENCY DEPT VISIT HI MDM: CPT | Mod: 25

## 2023-08-27 PROCEDURE — 80053 COMPREHEN METABOLIC PANEL: CPT | Performed by: EMERGENCY MEDICINE

## 2023-08-27 PROCEDURE — 85025 COMPLETE CBC W/AUTO DIFF WBC: CPT | Performed by: EMERGENCY MEDICINE

## 2023-08-27 PROCEDURE — 94761 N-INVAS EAR/PLS OXIMETRY MLT: CPT

## 2023-08-27 PROCEDURE — 85379 FIBRIN DEGRADATION QUANT: CPT | Performed by: EMERGENCY MEDICINE

## 2023-08-27 PROCEDURE — 83880 ASSAY OF NATRIURETIC PEPTIDE: CPT | Performed by: EMERGENCY MEDICINE

## 2023-08-27 PROCEDURE — 84484 ASSAY OF TROPONIN QUANT: CPT | Mod: 91 | Performed by: EMERGENCY MEDICINE

## 2023-08-27 PROCEDURE — 93005 ELECTROCARDIOGRAM TRACING: CPT

## 2023-08-27 PROCEDURE — 36415 COLL VENOUS BLD VENIPUNCTURE: CPT | Performed by: EMERGENCY MEDICINE

## 2023-08-27 PROCEDURE — 93010 ELECTROCARDIOGRAM REPORT: CPT | Mod: ,,, | Performed by: SPECIALIST

## 2023-08-28 ENCOUNTER — TELEPHONE (OUTPATIENT)
Dept: CARDIOLOGY | Facility: HOSPITAL | Age: 78
End: 2023-08-28

## 2023-08-28 LAB — TROPONIN I SERPL DL<=0.01 NG/ML-MCNC: 0.01 NG/ML (ref 0–0.03)

## 2023-08-28 NOTE — ED TRIAGE NOTES
Farrah KWAME Boston is here with episode of sharp chest pain to left chest when she tried to get up and now gone but has some numbness to left arm.

## 2023-08-28 NOTE — PROVIDER PROGRESS NOTES - EMERGENCY DEPT.
Encounter Date: 8/27/2023    ED Physician Progress Notes        Physician Note:   Patient signed out to me at shift change.  Patient with chest pain workup with normal D-dimer no further workup for PE protocol no significant metabolic or electrolyte pathology and all my evaluation with no respiratory distress of active chest pain.  Patient observed in ED on cardiac monitor with no acute dysrhythmia.  Repeat troponin within normal limits.  Patient remained asymptomatic.  Through shared decision-making with family, patient has heart score of 4 and would prefer to be discharged home with stress test this upcoming week and strict return precautions for worsening symptoms.  They have capacity to make their decision.  Stress test ordered and scheduling department notified via secure chat    Diagnosis  1) Chest Pain- Heart Score 4

## 2023-08-28 NOTE — ED PROVIDER NOTES
"Encounter Date: 8/27/2023       History     Chief Complaint   Patient presents with    Chest Pain     To left chest while getting up, lasted ~ 1 minute     HPI Mrs. Boston is a very pleasant 78-year-old woman who presents to the emergency department for evaluation of chest pain.  Patient reports having sudden onset severe left sided chest pain underneath her left ribcage with radiation to her left shoulder and arm lasting approximately 1 minute.  She did not have associated diaphoresis or shortness of breath but did hurt worse to breathe deeply.  No associated nausea.  No prior episodes.  She did have COVID this past month.  Denies any recent procedures, surgeries, travel or immobility.  No history of blood clots.  No unexplained weight loss.  She does have history of hypertension and cholesterol/triglyceride abnormalities.  No family history of MIs.  Review of patient's allergies indicates:   Allergen Reactions    Morphine Shortness Of Breath     Patient states her" breathing stops"    Codeine Nausea And Vomiting     Past Medical History:   Diagnosis Date    Allergy     Anxiety     Depression     GERD (gastroesophageal reflux disease)     H/O colonoscopy 10/6/2022    Need for hepatitis C screening test 2/28/2018    Negative    Osteopenia     Reflux esophagitis     Seasonal allergies      Past Surgical History:   Procedure Laterality Date    ABDOMINAL ADHESION SURGERY      ANTERIOR VAGINAL REPAIR      BREAST BIOPSY      benign    CHOLECYSTECTOMY      HERNIA REPAIR      HYSTERECTOMY       Family History   Problem Relation Age of Onset    Hypertension Mother     Cirrhosis Father     Breast cancer Maternal Aunt 70    Breast cancer Maternal Aunt 80    Breast cancer Paternal Grandmother 35    Ovarian cancer Neg Hx      Social History     Tobacco Use    Smoking status: Never    Smokeless tobacco: Never   Substance Use Topics    Alcohol use: Yes     Comment: rarely    Drug use: No     Review of Systems   Constitutional:  " Negative for fever.   HENT:  Negative for sore throat.    Respiratory:  Negative for shortness of breath.    Cardiovascular:  Positive for chest pain.   Gastrointestinal:  Negative for nausea.   Genitourinary:  Negative for dysuria.   Musculoskeletal:  Negative for back pain.   Skin:  Negative for rash.   Neurological:  Negative for weakness.   Hematological:  Does not bruise/bleed easily.       Physical Exam     Initial Vitals [08/27/23 2045]   BP Pulse Resp Temp SpO2   (!) 143/69 80 18 98.1 °F (36.7 °C) 96 %      MAP       --         Physical Exam    Constitutional: Vital signs are normal. She appears well-developed and well-nourished.  Non-toxic appearance. No distress.   HENT:   Head: Normocephalic and atraumatic.   Eyes: EOM are normal. Pupils are equal, round, and reactive to light.   Neck: Neck supple. No JVD present.   Normal range of motion.  Cardiovascular:  Normal rate, regular rhythm, normal heart sounds and intact distal pulses.     Exam reveals no gallop and no friction rub.       No murmur heard.  Pulmonary/Chest: Breath sounds normal. She has no wheezes. She has no rhonchi. She has no rales.   Abdominal: Abdomen is soft. Bowel sounds are normal. There is no abdominal tenderness. There is no rebound and no guarding.   Musculoskeletal:         General: Normal range of motion.      Cervical back: Normal range of motion and neck supple. No rigidity.     Neurological: She is alert and oriented to person, place, and time. She has normal strength and normal reflexes. No cranial nerve deficit or sensory deficit. She exhibits normal muscle tone. Coordination normal. GCS score is 15. GCS eye subscore is 4. GCS verbal subscore is 5. GCS motor subscore is 6.   Skin: Skin is warm and dry.   Psychiatric: She has a normal mood and affect. Her speech is normal and behavior is normal. She is not actively hallucinating.         ED Course   Procedures  Labs Reviewed   COMPREHENSIVE METABOLIC PANEL - Abnormal; Notable  for the following components:       Result Value    CO2 22 (*)     Glucose 119 (*)     All other components within normal limits   CBC W/ AUTO DIFFERENTIAL   TROPONIN I   B-TYPE NATRIURETIC PEPTIDE   D DIMER, QUANTITATIVE   TROPONIN I     EKG Readings: (Independently Interpreted)   Initial Reading: No STEMI.   Normal sinus rhythm, 78 beats per minute.  I do not appreciate any Q-waves or anterior infarct.  This is a normal EKG.  No significant change compared to previous EKG on January 11, 2022.       Imaging Results              X-Ray Chest AP Portable (Final result)  Result time 08/27/23 21:34:21      Final result by Hipolito Lee DO (08/27/23 21:34:21)                   Impression:      No acute abnormality.      Electronically signed by: Hipolito Lee  Date:    08/27/2023  Time:    21:34               Narrative:    EXAMINATION:  XR CHEST AP PORTABLE    CLINICAL HISTORY:  Other chest pain    TECHNIQUE:  Single frontal view of the chest was performed.    COMPARISON:  01/11/2022.    FINDINGS:  The lungs are well expanded and clear. No focal opacities are seen. The pleural spaces are clear. The cardiac silhouette is unremarkable.  There are calcifications of the aortic arch.  The visualized osseous structures are unremarkable.                                       Medications - No data to display  Medical Decision Making  78-year-old woman with chest pain with some radiation to her left shoulder and arm that lasted approximately 1 minute and has subsided.  Cardiac risk factors include age, hypertension, hyper cholesterolemia.  EKG nonischemic with negative troponin initially.  Second troponin pending.  Heart score 4.  Care will be turned over to oncoming physician pending repeat troponin.  If negative likely outpatient stress test and cardiology follow-up.  Low suspicion for ACS.  D-dimer negative, 0.38 with low risk for pulmonary embolism via [place] excludes pulmonary embolism.  BNP is 20, no evidence of  congestive heart failure.    Amount and/or Complexity of Data Reviewed  Labs: ordered. Decision-making details documented in ED Course.  Radiology: ordered.      Additional MDM:   Heart Score:    History:          Moderately suspicious.  ECG:             Normal  Age:               >65 years  Risk factors: 1-2 risk factors  Troponin:       Less than or equal to normal limit  Heart Score = 4                ED Course as of 08/28/23 1428   Mon Aug 28, 2023   0016 Troponin I: 0.007 [KB]   0016 Repeat troponin within normal limits.  Patient remained asymptomatic.  Through shared decision-making with family, patient has heart score of 4 and would prefer to be discharged home with stress test this upcoming week and strict return precautions for worsening symptoms.  They have capacity to make their decision.  Stress test ordered and scheduling department notified via secure chat [KB]      ED Course User Index  [KB] Killian Chan Jr., DO                    Clinical Impression:   Final diagnoses:  [R07.9] Chest pain  [R07.89] Atypical chest pain  [R07.9] Chest pain, unspecified type (Primary)        ED Disposition Condition    Discharge Stable          ED Prescriptions    None       Follow-up Information    None          Ross Feliz MD  08/28/23 1426

## 2023-08-29 ENCOUNTER — HOSPITAL ENCOUNTER (OUTPATIENT)
Dept: RADIOLOGY | Facility: HOSPITAL | Age: 78
Discharge: HOME | End: 2023-08-29
Attending: STUDENT IN AN ORGANIZED HEALTH CARE EDUCATION/TRAINING PROGRAM
Payer: MEDICARE

## 2023-08-29 ENCOUNTER — CLINICAL SUPPORT (OUTPATIENT)
Dept: CARDIOLOGY | Facility: HOSPITAL | Age: 78
End: 2023-08-29
Attending: STUDENT IN AN ORGANIZED HEALTH CARE EDUCATION/TRAINING PROGRAM
Payer: MEDICARE

## 2023-08-29 DIAGNOSIS — R07.9 CHEST PAIN, UNSPECIFIED TYPE: ICD-10-CM

## 2023-08-29 LAB
CV STRESS BASE HR: 62 BPM
DIASTOLIC BLOOD PRESSURE: 80 MMHG
OHS CV CPX 1 MINUTE RECOVERY HEART RATE: 128 BPM
OHS CV CPX 85 PERCENT MAX PREDICTED HEART RATE MALE: 118
OHS CV CPX ESTIMATED METS: 10.3
OHS CV CPX MAX PREDICTED HEART RATE: 139
OHS CV CPX PATIENT IS FEMALE: 1
OHS CV CPX PATIENT IS MALE: 0
OHS CV CPX PEAK DIASTOLIC BLOOD PRESSURE: 92 MMHG
OHS CV CPX PEAK HEAR RATE: 147 BPM
OHS CV CPX PEAK RATE PRESSURE PRODUCT: NORMAL
OHS CV CPX PEAK SYSTOLIC BLOOD PRESSURE: 201 MMHG
OHS CV CPX PERCENT MAX PREDICTED HEART RATE ACHIEVED: 106
OHS CV CPX RATE PRESSURE PRODUCT PRESENTING: 8246
STRESS ECHO POST EXERCISE DUR MIN: 8 MINUTES
STRESS ECHO POST EXERCISE DUR SEC: 30 SECONDS
SYSTOLIC BLOOD PRESSURE: 133 MMHG

## 2023-08-29 PROCEDURE — 78452 HT MUSCLE IMAGE SPECT MULT: CPT | Mod: TC

## 2023-08-29 PROCEDURE — 93018 CV STRESS TEST I&R ONLY: CPT | Mod: ,,, | Performed by: GENERAL PRACTICE

## 2023-08-29 PROCEDURE — 93016 NUCLEAR STRESS TEST (CUPID ONLY): ICD-10-PCS | Mod: ,,, | Performed by: GENERAL PRACTICE

## 2023-08-29 PROCEDURE — A9502 TC99M TETROFOSMIN: HCPCS

## 2023-08-29 PROCEDURE — 93016 CV STRESS TEST SUPVJ ONLY: CPT | Mod: ,,, | Performed by: GENERAL PRACTICE

## 2023-08-29 PROCEDURE — 93018 NUCLEAR STRESS TEST (CUPID ONLY): ICD-10-PCS | Mod: ,,, | Performed by: GENERAL PRACTICE

## 2023-08-29 PROCEDURE — 93017 CV STRESS TEST TRACING ONLY: CPT

## 2023-09-06 ENCOUNTER — PATIENT MESSAGE (OUTPATIENT)
Dept: OBSTETRICS AND GYNECOLOGY | Facility: CLINIC | Age: 78
End: 2023-09-06
Payer: MEDICARE

## 2023-09-06 DIAGNOSIS — Z12.31 VISIT FOR SCREENING MAMMOGRAM: Primary | ICD-10-CM

## 2023-09-12 ENCOUNTER — PATIENT MESSAGE (OUTPATIENT)
Dept: FAMILY MEDICINE | Facility: CLINIC | Age: 78
End: 2023-09-12

## 2023-09-19 RX ORDER — METOPROLOL SUCCINATE 25 MG/1
25 TABLET, EXTENDED RELEASE ORAL DAILY
Qty: 90 TABLET | Refills: 3 | Status: SHIPPED | OUTPATIENT
Start: 2023-09-19

## 2023-09-27 ENCOUNTER — HOSPITAL ENCOUNTER (OUTPATIENT)
Dept: RADIOLOGY | Facility: HOSPITAL | Age: 78
Discharge: HOME | End: 2023-09-27
Attending: OBSTETRICS & GYNECOLOGY
Payer: MEDICARE

## 2023-09-27 DIAGNOSIS — Z12.31 VISIT FOR SCREENING MAMMOGRAM: ICD-10-CM

## 2023-09-27 PROCEDURE — 77067 SCR MAMMO BI INCL CAD: CPT | Mod: TC,PO

## 2023-10-02 ENCOUNTER — PATIENT MESSAGE (OUTPATIENT)
Dept: OBSTETRICS AND GYNECOLOGY | Facility: CLINIC | Age: 78
End: 2023-10-02
Payer: MEDICARE

## 2023-10-06 ENCOUNTER — LAB VISIT (OUTPATIENT)
Dept: LAB | Facility: HOSPITAL | Age: 78
End: 2023-10-06
Attending: INTERNAL MEDICINE
Payer: MEDICARE

## 2023-10-06 DIAGNOSIS — R68.89 COLD INTOLERANCE: ICD-10-CM

## 2023-10-06 DIAGNOSIS — R79.89 ABNORMAL THYROID BLOOD TEST: ICD-10-CM

## 2023-10-06 LAB
T4 FREE SERPL-MCNC: 0.85 NG/DL (ref 0.71–1.51)
TSH SERPL DL<=0.005 MIU/L-ACNC: 6.4 UIU/ML (ref 0.34–5.6)

## 2023-10-06 PROCEDURE — 84439 ASSAY OF FREE THYROXINE: CPT | Performed by: INTERNAL MEDICINE

## 2023-10-06 PROCEDURE — 36415 COLL VENOUS BLD VENIPUNCTURE: CPT | Performed by: INTERNAL MEDICINE

## 2023-10-06 PROCEDURE — 84443 ASSAY THYROID STIM HORMONE: CPT | Performed by: INTERNAL MEDICINE

## 2023-10-09 ENCOUNTER — OFFICE VISIT (OUTPATIENT)
Dept: FAMILY MEDICINE | Facility: CLINIC | Age: 78
End: 2023-10-09
Payer: MEDICARE

## 2023-10-09 VITALS
SYSTOLIC BLOOD PRESSURE: 115 MMHG | HEIGHT: 66 IN | DIASTOLIC BLOOD PRESSURE: 70 MMHG | HEART RATE: 70 BPM | WEIGHT: 143 LBS | BODY MASS INDEX: 22.98 KG/M2

## 2023-10-09 DIAGNOSIS — R68.89 COLD INTOLERANCE: ICD-10-CM

## 2023-10-09 DIAGNOSIS — Z23 NEED FOR SHINGLES VACCINE: ICD-10-CM

## 2023-10-09 DIAGNOSIS — R79.89 ABNORMAL THYROID BLOOD TEST: ICD-10-CM

## 2023-10-09 DIAGNOSIS — K21.9 GASTROESOPHAGEAL REFLUX DISEASE WITHOUT ESOPHAGITIS: ICD-10-CM

## 2023-10-09 DIAGNOSIS — Z23 NEEDS FLU SHOT: ICD-10-CM

## 2023-10-09 DIAGNOSIS — E78.2 MIXED HYPERLIPIDEMIA: Primary | ICD-10-CM

## 2023-10-09 DIAGNOSIS — R53.83 CAREGIVER WITH FATIGUE: ICD-10-CM

## 2023-10-09 PROCEDURE — 90662 FLU VACCINE - QUADRIVALENT - HIGH DOSE (65+) PRESERVATIVE FREE IM: ICD-10-PCS | Mod: S$GLB,,, | Performed by: INTERNAL MEDICINE

## 2023-10-09 PROCEDURE — 99213 OFFICE O/P EST LOW 20 MIN: CPT | Mod: S$GLB,,, | Performed by: INTERNAL MEDICINE

## 2023-10-09 PROCEDURE — 3078F DIAST BP <80 MM HG: CPT | Mod: CPTII,S$GLB,, | Performed by: INTERNAL MEDICINE

## 2023-10-09 PROCEDURE — 3288F PR FALLS RISK ASSESSMENT DOCUMENTED: ICD-10-PCS | Mod: CPTII,S$GLB,, | Performed by: INTERNAL MEDICINE

## 2023-10-09 PROCEDURE — G0008 ADMIN INFLUENZA VIRUS VAC: HCPCS | Mod: S$GLB,,, | Performed by: INTERNAL MEDICINE

## 2023-10-09 PROCEDURE — 1160F RVW MEDS BY RX/DR IN RCRD: CPT | Mod: CPTII,S$GLB,, | Performed by: INTERNAL MEDICINE

## 2023-10-09 PROCEDURE — 3078F PR MOST RECENT DIASTOLIC BLOOD PRESSURE < 80 MM HG: ICD-10-PCS | Mod: CPTII,S$GLB,, | Performed by: INTERNAL MEDICINE

## 2023-10-09 PROCEDURE — 3074F PR MOST RECENT SYSTOLIC BLOOD PRESSURE < 130 MM HG: ICD-10-PCS | Mod: CPTII,S$GLB,, | Performed by: INTERNAL MEDICINE

## 2023-10-09 PROCEDURE — G0008 FLU VACCINE - QUADRIVALENT - HIGH DOSE (65+) PRESERVATIVE FREE IM: ICD-10-PCS | Mod: S$GLB,,, | Performed by: INTERNAL MEDICINE

## 2023-10-09 PROCEDURE — 1101F PR PT FALLS ASSESS DOC 0-1 FALLS W/OUT INJ PAST YR: ICD-10-PCS | Mod: CPTII,S$GLB,, | Performed by: INTERNAL MEDICINE

## 2023-10-09 PROCEDURE — 1126F PR PAIN SEVERITY QUANTIFIED, NO PAIN PRESENT: ICD-10-PCS | Mod: CPTII,S$GLB,, | Performed by: INTERNAL MEDICINE

## 2023-10-09 PROCEDURE — 1159F PR MEDICATION LIST DOCUMENTED IN MEDICAL RECORD: ICD-10-PCS | Mod: CPTII,S$GLB,, | Performed by: INTERNAL MEDICINE

## 2023-10-09 PROCEDURE — 99213 PR OFFICE/OUTPT VISIT, EST, LEVL III, 20-29 MIN: ICD-10-PCS | Mod: S$GLB,,, | Performed by: INTERNAL MEDICINE

## 2023-10-09 PROCEDURE — 1126F AMNT PAIN NOTED NONE PRSNT: CPT | Mod: CPTII,S$GLB,, | Performed by: INTERNAL MEDICINE

## 2023-10-09 PROCEDURE — 90662 IIV NO PRSV INCREASED AG IM: CPT | Mod: S$GLB,,, | Performed by: INTERNAL MEDICINE

## 2023-10-09 PROCEDURE — 1160F PR REVIEW ALL MEDS BY PRESCRIBER/CLIN PHARMACIST DOCUMENTED: ICD-10-PCS | Mod: CPTII,S$GLB,, | Performed by: INTERNAL MEDICINE

## 2023-10-09 PROCEDURE — 3288F FALL RISK ASSESSMENT DOCD: CPT | Mod: CPTII,S$GLB,, | Performed by: INTERNAL MEDICINE

## 2023-10-09 PROCEDURE — 1159F MED LIST DOCD IN RCRD: CPT | Mod: CPTII,S$GLB,, | Performed by: INTERNAL MEDICINE

## 2023-10-09 PROCEDURE — 1101F PT FALLS ASSESS-DOCD LE1/YR: CPT | Mod: CPTII,S$GLB,, | Performed by: INTERNAL MEDICINE

## 2023-10-09 PROCEDURE — 3074F SYST BP LT 130 MM HG: CPT | Mod: CPTII,S$GLB,, | Performed by: INTERNAL MEDICINE

## 2023-10-09 RX ORDER — ZOSTER VACCINE RECOMBINANT, ADJUVANTED 50 MCG/0.5
0.5 KIT INTRAMUSCULAR
Qty: 1 EACH | Refills: 1 | Status: SHIPPED | OUTPATIENT
Start: 2023-10-09 | End: 2024-04-01

## 2023-10-09 NOTE — PROGRESS NOTES
Subjective:       Patient ID: Farrah Boston is a 78 y.o. female.    Chief Complaint: Thyroid Problem, Follow-up, Palpitations, and Social issues (Has to take care of mother who is 100 and demanding)    Ms. Farrah Boston is a 78-year-old  female who comes for fup       Generally she has not been feeling well and feels somewhat run down.  Can not specify.  The most important issue seems to be an anterior abdominal discomfort or cramp or a pressure-like sensation for the last month or so.  It has started insidiously and she can not pinpoint any event or association with any other symptoms.  She also has a week low back pain which has been going on for perhaps months to years.  No     No diarrhea, nausea, vomiting or constipation.  Just feeling unwell.    Initially I got the impression that she had an intact uterus but later on upon clarification she did have a partial hysterectomy.  Status of last gyn examination is uncertain and it she can not get an appointment with a gynecologist within the next 2 weeks.      He does state that her bowel movements are regular.      She has not been thus far diagnosed with kidney stones and she has an intact appendix.      The pain seems to be in the lower quadrants both right and left and also to some extent suprapubic region.  Thus far she has not been diagnosed with hernias.  It is unclear if she had any bowel adhesions in past.  In the epic system I do not have any CT        Thyroid Problem  Patient reports no constipation, diarrhea or weight loss.   Follow-up  Associated symptoms include abdominal pain. Pertinent negatives include no anorexia, arthralgias, chest pain, fever, headaches, myalgias, nausea or vomiting.   Back Pain  This is a new problem. The current episode started more than 1 month ago. The problem occurs intermittently. The problem has been waxing and waning since onset. The pain is present in the gluteal. The quality of the pain is described as aching.  Radiates to: Unknown if it is affiliated with anterior abdominal lower abdominal pain. The pain is at a severity of 3/10. The pain is mild. The pain is The same all the time. Associated symptoms include abdominal pain. Pertinent negatives include no bowel incontinence, chest pain, dysuria, fever, headaches, leg pain, tingling or weight loss. Risk factors include lack of exercise and sedentary lifestyle. She has tried bed rest for the symptoms. The treatment provided mild relief.   Abdominal Pain  This is a new problem. The current episode started more than 1 month ago. The onset quality is undetermined. The problem occurs constantly. The problem has been waxing and waning. The pain is located in the LLQ and RLQ (Lower abdomen). The pain is at a severity of 3/10. The pain is mild. The abdominal pain does not radiate. Pertinent negatives include no anorexia, arthralgias, belching, constipation, diarrhea, dysuria, fever, flatus, frequency, headaches, hematochezia, hematuria, melena, myalgias, nausea, vomiting or weight loss. Associated symptoms comments: Simply feeling unwell..       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     Comment: [employer]   Tobacco Use    Smoking status: Never    Smokeless tobacco: Never   Substance and Sexual Activity    Alcohol use: Yes     Comment: rarely    Drug use: No    Sexual activity: Yes     Partners: Male     Birth control/protection: None     Social Determinants of Health     Financial Resource Strain: Low Risk  (5/2/2023)    Overall Financial Resource Strain  (CARDIA)     Difficulty of Paying Living Expenses: Not very hard   Food Insecurity: No Food Insecurity (5/2/2023)    Hunger Vital Sign     Worried About Running Out of Food in the Last Year: Never true     Ran Out of Food in the Last Year: Never true   Transportation Needs: No Transportation Needs (5/2/2023)    PRAPARE - Transportation     Lack of Transportation (Medical): No     Lack of Transportation (Non-Medical): No   Physical Activity: Inactive (5/2/2023)    Exercise Vital Sign     Days of Exercise per Week: 0 days     Minutes of Exercise per Session: 0 min   Stress: No Stress Concern Present (5/2/2023)    [employer] - Occupational Stress Questionnaire     Feeling of Stress : Only a little   Social Connections: Socially Integrated (5/2/2023)    Social Connection and Isolation Panel [NHANES]     Frequency of Communication with Friends and Family: More than three times a week     Frequency of Social Gatherings with Friends and Family: More than three times a week     Attends Evangelical Services: 1 to 4 times per year     Active Member of Clubs or Organizations: Yes     Attends Club or Organization Meetings: 1 to 4 times per year     Marital Status:    Housing Stability: Low Risk  (5/2/2023)    Housing Stability Vital Sign     Unable to Pay for Housing in the Last Year: No     Number of Places Lived in the Last Year: 1     Unstable Housing in the Last Year: No     Past Surgical History:   Procedure Laterality Date    ABDOMINAL ADHESION SURGERY      ANTERIOR VAGINAL REPAIR      BREAST BIOPSY      benign    CHOLECYSTECTOMY      HERNIA REPAIR      HYSTERECTOMY       Family History   Problem Relation Age of Onset    Hypertension Mother     Cirrhosis Father     Breast cancer Maternal Aunt 70    Breast cancer Maternal Aunt 80    Breast cancer Paternal Grandmother 35    Ovarian cancer Neg Hx        Review of Systems   Constitutional:  Negative for fever and weight loss.  "  Cardiovascular:  Negative for chest pain.   Gastrointestinal:  Positive for abdominal pain. Negative for anorexia, bowel incontinence, constipation, diarrhea, flatus, hematochezia, melena, nausea and vomiting.   Genitourinary:  Negative for dysuria, frequency and hematuria.   Musculoskeletal:  Positive for back pain. Negative for arthralgias and myalgias.   Neurological:  Negative for tingling and headaches.         Objective:      Blood pressure 115/70, pulse 70, height 5' 6" (1.676 m), weight 64.9 kg (143 lb). Body mass index is 23.08 kg/m².  Physical Exam  Constitutional:       General: She is not in acute distress.     Appearance: She is well-developed. She is not ill-appearing or diaphoretic.      Comments: BMI is 23.08   Eyes:      General: No scleral icterus.        Right eye: No discharge.         Left eye: No discharge.   Neck:      Thyroid: No thyromegaly.      Trachea: No tracheal deviation.   Cardiovascular:      Rate and Rhythm: Normal rate and regular rhythm.   Pulmonary:      Effort: Pulmonary effort is normal. No respiratory distress.      Breath sounds: Normal breath sounds. No stridor.   Abdominal:      General: There is no distension.      Palpations: Abdomen is soft.      Tenderness: There is no abdominal tenderness.   Musculoskeletal:         General: No deformity.      Cervical back: No tenderness.      Lumbar back: No swelling, edema, deformity, tenderness or bony tenderness. Normal range of motion.      Right lower leg: No edema.      Left lower leg: No edema.   Skin:     Coloration: Skin is not pale.      Findings: No erythema or rash.   Neurological:      Mental Status: She is alert. Mental status is at baseline.      Motor: No tremor or atrophy.   Psychiatric:         Mood and Affect: Mood is elated.         Behavior: Behavior normal.         Thought Content: Thought content normal.      Comments: Slightly anxious.           Assessment:               Mixed hyperlipidemia    Abnormal " thyroid blood test  -     TSH; Future; Expected date: 04/09/2024    Gastroesophageal reflux disease without esophagitis    Caregiver with fatigue    Cold intolerance  -     TSH; Future; Expected date: 04/09/2024    Needs flu shot  -     Influenza - Quadrivalent - High Dose (65+) (PF) (IM)    Need for shingles vaccine  -     varicella-zoster gE-AS01B, PF, (SHINGRIX, PF,) 50 mcg/0.5 mL injection; Inject 0.5 mLs into the muscle every 6 (six) months. for 180 doses  Dispense: 1 each; Refill: 1       Lab Visit on 10/06/2023   Component Date Value Ref Range Status    Free T4 10/06/2023 0.85  0.71 - 1.51 ng/dL Final    TSH 10/06/2023 6.401 (H)  0.340 - 5.600 uIU/mL Final   Clinical Support on 08/29/2023   Component Date Value Ref Range Status    85% Max Predicted HR 08/29/2023 118   Final    Max Predicted HR 08/29/2023 139   Final    [facility] CV CPX PATIENT IS MALE 08/29/2023 0.0   Final    [facility] CV CPX PATIENT IS FEMALE 08/29/2023 1.0   Final    Systolic blood pressure 08/29/2023 133  mmHg Final    Diastolic blood pressure 08/29/2023 80  mmHg Final    HR at rest 08/29/2023 62  bpm Final    Exercise duration (min) 08/29/2023 8  minutes Final    Exercise duration (sec) 08/29/2023 30  seconds Final    Peak Systolic BP 08/29/2023 201  mmHg Final    Peak Diatolic BP 08/29/2023 92  mmHg Final    Peak HR 08/29/2023 147  bpm Final    Estimated METs 08/29/2023 10.3   Final    % Max HR Achieved 08/29/2023 106   Final    1 Minute Recovery HR 08/29/2023 128  bpm Final    RPP 08/29/2023 8,246   Final    Peak RPP 08/29/2023 29,547   Final   Admission on 08/27/2023, Discharged on 08/28/2023   Component Date Value Ref Range Status    WBC 08/27/2023 5.36  3.90 - 12.70 K/uL Final    RBC 08/27/2023 4.47  4.00 - 5.40 M/uL Final    Hemoglobin 08/27/2023 13.2  12.0 - 16.0 g/dL Final    Hematocrit 08/27/2023 41.1  37.0 - 48.5 % Final    MCV 08/27/2023 92  82 - 98 fL Final    MCH 08/27/2023 29.5  27.0 - 31.0 pg Final    MCHC 08/27/2023 32.1  32.0 - 36.0  g/dL Final    RDW 08/27/2023 12.3  11.5 - 14.5 % Final    Platelets 08/27/2023 291  150 - 450 K/uL Final    MPV 08/27/2023 9.3  9.2 - 12.9 fL Final    Immature Granulocytes 08/27/2023 0.2  0.0 - 0.5 % Final    Gran # (ANC) 08/27/2023 2.5  1.8 - 7.7 K/uL Final    Immature Grans (Abs) 08/27/2023 0.01  0.00 - 0.04 K/uL Final    Lymph # 08/27/2023 2.1  1.0 - 4.8 K/uL Final    Mono # 08/27/2023 0.5  0.3 - 1.0 K/uL Final    Eos # 08/27/2023 0.2  0.0 - 0.5 K/uL Final    Baso # 08/27/2023 0.03  0.00 - 0.20 K/uL Final    nRBC 08/27/2023 0  0 /100 WBC Final    Gran % 08/27/2023 46.3  38.0 - 73.0 % Final    Lymph % 08/27/2023 39.4  18.0 - 48.0 % Final    Mono % 08/27/2023 10.1  4.0 - 15.0 % Final    Eosinophil % 08/27/2023 3.4  0.0 - 8.0 % Final    Basophil % 08/27/2023 0.6  0.0 - 1.9 % Final    Differential Method 08/27/2023 Automated   Final    Sodium 08/27/2023 139  136 - 145 mmol/L Final    Potassium 08/27/2023 4.1  3.5 - 5.1 mmol/L Final    Chloride 08/27/2023 105  95 - 110 mmol/L Final    CO2 08/27/2023 22 (L)  23 - 29 mmol/L Final    Glucose 08/27/2023 119 (H)  70 - 110 mg/dL Final    BUN 08/27/2023 17  8 - 23 mg/dL Final    Creatinine 08/27/2023 0.9  0.5 - 1.4 mg/dL Final    Calcium 08/27/2023 9.0  8.7 - 10.5 mg/dL Final    Total Protein 08/27/2023 6.9  6.0 - 8.4 g/dL Final    Albumin 08/27/2023 4.1  3.5 - 5.2 g/dL Final    Total Bilirubin 08/27/2023 0.3  0.1 - 1.0 mg/dL Final    Alkaline Phosphatase 08/27/2023 105  55 - 135 U/L Final    AST 08/27/2023 20  10 - 40 U/L Final    ALT 08/27/2023 31  10 - 44 U/L Final    eGFR 08/27/2023 >60  >60 mL/min/1.73 m^2 Final    Anion Gap 08/27/2023 12  8 - 16 mmol/L Final    Troponin I 08/27/2023 <0.006  0.000 - 0.026 ng/mL Final    BNP 08/27/2023 20  0 - 99 pg/mL Final    D-Dimer 08/27/2023 0.38  <0.50 mg/L FEU Final    Troponin I 08/27/2023 0.007  0.000 - 0.026 ng/mL Final         Plan:           Mixed hyperlipidemia    Abnormal thyroid blood test  -     TSH; Future; Expected  date: 04/09/2024    Gastroesophageal reflux disease without esophagitis    Caregiver with fatigue    Cold intolerance  -     TSH; Future; Expected date: 04/09/2024    Needs flu shot  -     Influenza - Quadrivalent - High Dose (65+) (PF) (IM)    Need for shingles vaccine  -     varicella-zoster gE-AS01B, PF, (SHINGRIX, PF,) 50 mcg/0.5 mL injection; Inject 0.5 mLs into the muscle every 6 (six) months. for 180 doses  Dispense: 1 each; Refill: 1    Patient is generally is doing okay.  Her TSH is coming down and T4 is within normal range.  She would like to wait another six-month before she decides to take any thyroid prescription on supplementation.  Will check her TSH again in 6 months and if it continues to be abnormal will consider a low-dose levothyroxine.      Today she got a flu shot.      Prescription has been again given for shingles vaccine which she would like to give.      On social front and personal friend, she has to take care of her 100 year old mother recently celebrated her centenary.    There seems to be some caregiver fatigue with no help from other sources and mother being somewhat more ornery and demanding.  I have given her some local resources and booklet form which he can check out.  She needs to take time out for herself for rejuvenation and self healing.  Burnout is possible.    Follow-up in 6 months and check labs at that point.  Continue with COVID precautions.  Get ophthalmology examination.      Current Outpatient Medications:     atorvastatin (LIPITOR) 10 MG tablet, TAKE 1 TABLET EVERY DAY, Disp: 90 tablet, Rfl: 3    calcium-vitamin D3 (OS-LUIS 500 + D3) 500 mg(1,250mg) -200 unit per tablet, Take 1 tablet by mouth 2 (two) times daily with meals., Disp: , Rfl:     coQ10, ubiquinol, 200 mg Cap, Take 1 tablet by mouth Daily., Disp: , Rfl:     magnesium 200 mg Tab, Take 400 mg by mouth once., Disp: , Rfl:     metoprolol succinate (TOPROL-XL) 25 MG 24 hr tablet, TAKE 1 TABLET (25 MG TOTAL) BY  MOUTH ONCE DAILY., Disp: 90 tablet, Rfl: 3    multivit-min/iron/folic/lutein (CENTRUM SILVER WOMEN ORAL), Take 1 tablet by mouth Daily., Disp: , Rfl:     polyethylene glycol 3350 (MIRALAX ORAL), Take by mouth. Take 3/4 capful by mouth nightly as needed, Disp: , Rfl:     varicella-zoster gE-AS01B, PF, (SHINGRIX, PF,) 50 mcg/0.5 mL injection, Inject 0.5 mLs into the muscle every 6 (six) months. for 180 doses, Disp: 1 each, Rfl: 1

## 2024-02-27 DIAGNOSIS — Z00.00 ENCOUNTER FOR MEDICARE ANNUAL WELLNESS EXAM: ICD-10-CM

## 2024-03-26 ENCOUNTER — LAB VISIT (OUTPATIENT)
Dept: LAB | Facility: HOSPITAL | Age: 79
End: 2024-03-26
Attending: INTERNAL MEDICINE
Payer: MEDICARE

## 2024-03-26 DIAGNOSIS — R79.89 ABNORMAL THYROID BLOOD TEST: ICD-10-CM

## 2024-03-26 DIAGNOSIS — R68.89 COLD INTOLERANCE: ICD-10-CM

## 2024-03-26 LAB — TSH SERPL DL<=0.005 MIU/L-ACNC: 4.53 UIU/ML (ref 0.34–5.6)

## 2024-03-26 PROCEDURE — 36415 COLL VENOUS BLD VENIPUNCTURE: CPT | Performed by: INTERNAL MEDICINE

## 2024-03-26 PROCEDURE — 84443 ASSAY THYROID STIM HORMONE: CPT | Performed by: INTERNAL MEDICINE

## 2024-03-30 PROBLEM — I70.0 AORTIC ATHEROSCLEROSIS: Status: ACTIVE | Noted: 2024-03-30

## 2024-03-30 NOTE — PROGRESS NOTES
Subjective:       Patient ID: Farrah Boston is a 79 y.o. female.    Chief Complaint: Thyroid Problem and Follow-up    Ms. Farrah Boston is a 79-year-old  female who comes for fup .      1. Hyperlipidemia currently she is taking atorvastatin 10 mg and last year her cholesterol panel was good.    2.-her thyroid tests have been elevated.  Repeat thyroid test recently is within range good range.  But slightly on the higher side of normal.  She used to take biotin supplements in past which could have interfered with the thyroid as his.    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        Generally she has not been feeling well and feels somewhat run down.  Can not specify.  The most important issue seems to be an anterior abdominal discomfort or cramp or a pressure-like sensation for the last month or so.  It has started insidiously and she can not pinpoint any event or association with any other symptoms.  She also has a week low back pain which has been going on for perhaps months to years.  No     No diarrhea, nausea, vomiting or constipation.  Just feeling unwell.    Initially I got the impression that she had an intact uterus but later on upon clarification she did have a partial hysterectomy.  Status of last gyn examination is uncertain and it she can not get an appointment with a gynecologist within the next 2 weeks.      He does state that her bowel movements are regular.      She has not been thus far diagnosed with kidney stones and she has an intact appendix.      The pain seems to be in the lower quadrants both right and left and also to some extent suprapubic region.  Thus far she has not been diagnosed with hernias.  It is unclear if she had any bowel adhesions in past.  In the epic system I do not have any CT        Thyroid Problem  Patient reports no cold intolerance, constipation, diaphoresis, diarrhea or weight loss.   Follow-up  Pertinent negatives  include no abdominal pain, anorexia, arthralgias, chest pain, congestion, coughing, diaphoresis, fever, headaches, myalgias, nausea or vomiting.   Back Pain  This is a new problem. The current episode started more than 1 month ago. The problem occurs intermittently. The problem has been waxing and waning since onset. The pain is present in the gluteal. The quality of the pain is described as aching. Radiates to: Unknown if it is affiliated with anterior abdominal lower abdominal pain. The pain is at a severity of 3/10. The pain is mild. The pain is The same all the time. Pertinent negatives include no abdominal pain, bowel incontinence, chest pain, dysuria, fever, headaches, leg pain, tingling or weight loss. Risk factors include lack of exercise and sedentary lifestyle. She has tried bed rest for the symptoms. The treatment provided mild relief.   Abdominal Pain  This is a new problem. The current episode started more than 1 month ago. The onset quality is undetermined. The problem occurs constantly. The problem has been waxing and waning. The pain is located in the LLQ and RLQ (Lower abdomen). The pain is at a severity of 3/10. The pain is mild. The abdominal pain does not radiate. Pertinent negatives include no anorexia, arthralgias, belching, constipation, diarrhea, dysuria, fever, flatus, frequency, headaches, hematochezia, hematuria, melena, myalgias, nausea, vomiting or weight loss. Associated symptoms comments: Simply feeling unwell..       Past Medical History:   Diagnosis Date    Allergy     Anxiety     Depression     GERD (gastroesophageal reflux disease)     H/O colonoscopy 10/6/2022    Need for hepatitis C screening test 2/28/2018    Negative    Osteopenia     Reflux esophagitis     Seasonal allergies      Social History     Socioeconomic History    Marital status:      Spouse name: Mike Boston    Number of children: 3   Occupational History    Occupation: Journal Contributor/Writer      Comment: [employer]   Tobacco Use    Smoking status: Never    Smokeless tobacco: Never   Substance and Sexual Activity    Alcohol use: Yes     Comment: rarely    Drug use: No    Sexual activity: Yes     Partners: Male     Birth control/protection: None     Social Determinants of Health     Financial Resource Strain: Low Risk  (5/2/2023)    Overall Financial Resource Strain (CARDIA)     Difficulty of Paying Living Expenses: Not very hard   Food Insecurity: No Food Insecurity (5/2/2023)    Hunger Vital Sign     Worried About Running Out of Food in the Last Year: Never true     Ran Out of Food in the Last Year: Never true   Transportation Needs: No Transportation Needs (5/2/2023)    PRAPARE - Transportation     Lack of Transportation (Medical): No     Lack of Transportation (Non-Medical): No   Physical Activity: Inactive (5/2/2023)    Exercise Vital Sign     Days of Exercise per Week: 0 days     Minutes of Exercise per Session: 0 min   Stress: No Stress Concern Present (5/2/2023)    [place] [employer] - Occupational Stress Questionnaire     Feeling of Stress : Only a little   Social Connections: Socially Integrated (5/2/2023)    Social Connection and Isolation Panel [NHANES]     Frequency of Communication with Friends and Family: More than three times a week     Frequency of Social Gatherings with Friends and Family: More than three times a week     Attends Denominational Services: 1 to 4 times per year     Active Member of Clubs or Organizations: Yes     Attends Club or Organization Meetings: 1 to 4 times per year     Marital Status:    Housing Stability: Low Risk  (5/2/2023)    Housing Stability Vital Sign     Unable to Pay for Housing in the Last Year: No     Number of Places Lived in the Last Year: 1     Unstable Housing in the Last Year: No     Past Surgical History:   Procedure Laterality Date    ABDOMINAL ADHESION SURGERY      ANTERIOR VAGINAL REPAIR      BREAST BIOPSY      benign  "   CHOLECYSTECTOMY      HERNIA REPAIR      HYSTERECTOMY       Family History   Problem Relation Age of Onset    Hypertension Mother     Cirrhosis Father     Breast cancer Maternal Aunt 70    Breast cancer Maternal Aunt 80    Breast cancer Paternal Grandmother 35    Ovarian cancer Neg Hx        Review of Systems   Constitutional:  Negative for activity change, diaphoresis, fever, unexpected weight change and weight loss.   HENT:  Negative for congestion, facial swelling and sinus pressure.    Eyes:  Negative for pain, discharge and visual disturbance.   Respiratory:  Negative for apnea, cough and wheezing.    Cardiovascular:  Negative for chest pain.   Gastrointestinal:  Negative for abdominal pain, anorexia, blood in stool, bowel incontinence, constipation, diarrhea, flatus, hematochezia, melena, nausea and vomiting.   Endocrine: Negative for cold intolerance, polydipsia and polyuria.        TSH level was somewhat elevated.   Genitourinary:  Negative for dysuria, frequency and hematuria.   Musculoskeletal:  Negative for arthralgias, back pain (doing good) and myalgias.   Neurological:  Negative for tingling and headaches.         Objective:      Blood pressure 131/73, pulse 73, height 5' 6" (1.676 m), weight 65.8 kg (145 lb). Body mass index is 23.4 kg/m².  Physical Exam  Constitutional:       General: She is not in acute distress.     Appearance: She is well-developed. She is not ill-appearing or diaphoretic.      Comments: BMI is 23.08   Eyes:      General: No scleral icterus.        Right eye: No discharge.         Left eye: No discharge.   Neck:      Thyroid: No thyromegaly.      Trachea: No tracheal deviation.   Cardiovascular:      Rate and Rhythm: Normal rate and regular rhythm.   Pulmonary:      Effort: Pulmonary effort is normal. No respiratory distress.      Breath sounds: Normal breath sounds. No stridor.   Abdominal:      General: There is no distension.      Palpations: Abdomen is soft.      Tenderness: " There is no abdominal tenderness.   Musculoskeletal:         General: No deformity.      Cervical back: No tenderness.      Lumbar back: No swelling, edema, deformity, tenderness or bony tenderness. Normal range of motion.      Right lower leg: No edema.      Left lower leg: No edema.   Skin:     Coloration: Skin is not pale.      Findings: No erythema or rash.   Neurological:      Mental Status: She is alert. Mental status is at baseline.      Motor: No tremor or atrophy.   Psychiatric:         Mood and Affect: Mood is elated.         Behavior: Behavior normal.         Thought Content: Thought content normal.      Comments: Slightly anxious.           Assessment:       Lab Visit on 03/26/2024   Component Date Value Ref Range Status    TSH 03/26/2024 4.533  0.340 - 5.600 uIU/mL Final       1. Mixed hyperlipidemia  Comments:  Miss Yan is compliant to her cholesterol medications.  Next visit will check her lipid panel again.  Tolerating it well.  Orders:  -     Lipid Panel; Future; Expected date: 08/26/2024  -     Basic Metabolic Panel; Future; Expected date: 08/26/2024  -     ALT (SGPT); Future; Expected date: 08/26/2024    2. Gastroesophageal reflux disease without esophagitis  Comments:  Reflux symptomss almost non-existent.She has not on any medications. Please continue to watch diet and avoid too much caffeine/carbonated beverages/greasy food  Overview:  03/24/2015:-Patient takes omeprazole usually once a day. Sometimes in the reflux gets worse she takes 2 a day.    10/5/17 esophagram normal. done due to dysphagia symptoms          3. Aortic atherosclerosis  Comments:  Continue cholesterol-lowering measures.    4. Electrolyte abnormality  -     Magnesium; Future; Expected date: 08/26/2024    5. Abnormal thyroid blood test    6. Mixed anxiety depressive disorder  Overview:  Patient has some family issues in confidence from which she feels anxious and stressed. There are some unresolved marital  "issues.    Orders:  -     TSH; Future; Expected date: 08/26/2024           Component Ref Range & Units 6 d ago 5 mo ago 11 mo ago 2 yr ago 14 yr ago 15 yr ago 16 yr ago   TSH 0.340 - 5.600 uIU/mL 4.533 6.401 High  8.140 High  5.360 3.53 R 2.9 R 4.0 R       Plan:   Mixed hyperlipidemia  Comments:  Miss Yan is compliant to her cholesterol medications.  Next visit will check her lipid panel again.  Tolerating it well.  Orders:  -     Lipid Panel; Future; Expected date: 08/26/2024  -     Basic Metabolic Panel; Future; Expected date: 08/26/2024  -     ALT (SGPT); Future; Expected date: 08/26/2024    Gastroesophageal reflux disease without esophagitis  Comments:  Reflux symptomss almost non-existent.She has not on any medications. Please continue to watch diet and avoid too much caffeine/carbonated beverages/greasy food    Aortic atherosclerosis  Comments:  Continue cholesterol-lowering measures.    Electrolyte abnormality  -     Magnesium; Future; Expected date: 08/26/2024    Abnormal thyroid blood test    Mixed anxiety depressive disorder  -     TSH; Future; Expected date: 08/26/2024      Overall miss Yan is doing good.  She is keeping herself busy help an interesting gentleman with his autobiography      Her cholesterol numbers till last year were good and will check again in 6 months time.  Her TSH levels are coming back in normal range.  They were somewhat elevated in past which could have been due to biotin supplement interference.  I will check a TSH again in 6 months to be sure that we are on the right side.    Her back is doing okay and our colleague Dr. Harpreet Pepper M.D.  Explained her the "back care" and she is doing wonderful after following that advised.      Heartburn and reflux symptoms are also doing good and she will continue to watch her diet.    Palpitations? ?  are better at this point and her blood pressures are also good and she is taking magnesium supplements.  Not sure if she had palpitations " and if so, why she is taking magnesium  Follow up in about 6 months (around 10/1/2024) for GERD/lipids.      Current Outpatient Medications:     atorvastatin (LIPITOR) 10 MG tablet, TAKE 1 TABLET EVERY DAY, Disp: 90 tablet, Rfl: 3    calcium-vitamin D3 (OS-LUIS 500 + D3) 500 mg(1,250mg) -200 unit per tablet, Take 1 tablet by mouth 2 (two) times daily with meals., Disp: , Rfl:     coQ10, ubiquinol, 200 mg Cap, Take 1 tablet by mouth Daily., Disp: , Rfl:     magnesium 200 mg Tab, Take 400 mg by mouth once., Disp: , Rfl:     metoprolol succinate (TOPROL-XL) 25 MG 24 hr tablet, TAKE 1 TABLET (25 MG TOTAL) BY MOUTH ONCE DAILY., Disp: 90 tablet, Rfl: 3    multivit-min/iron/folic/lutein (CENTRUM SILVER WOMEN ORAL), Take 1 tablet by mouth Daily., Disp: , Rfl:     polyethylene glycol 3350 (MIRALAX ORAL), Take by mouth. Take 3/4 capful by mouth nightly as needed, Disp: , Rfl:     Jun Lomeli

## 2024-04-01 ENCOUNTER — OFFICE VISIT (OUTPATIENT)
Dept: FAMILY MEDICINE | Facility: CLINIC | Age: 79
End: 2024-04-01
Payer: MEDICARE

## 2024-04-01 VITALS
HEIGHT: 66 IN | WEIGHT: 145 LBS | HEART RATE: 73 BPM | SYSTOLIC BLOOD PRESSURE: 131 MMHG | BODY MASS INDEX: 23.3 KG/M2 | DIASTOLIC BLOOD PRESSURE: 73 MMHG

## 2024-04-01 DIAGNOSIS — I70.0 AORTIC ATHEROSCLEROSIS: ICD-10-CM

## 2024-04-01 DIAGNOSIS — F41.8 MIXED ANXIETY DEPRESSIVE DISORDER: ICD-10-CM

## 2024-04-01 DIAGNOSIS — K21.9 GASTROESOPHAGEAL REFLUX DISEASE WITHOUT ESOPHAGITIS: Chronic | ICD-10-CM

## 2024-04-01 DIAGNOSIS — E78.2 MIXED HYPERLIPIDEMIA: Primary | Chronic | ICD-10-CM

## 2024-04-01 DIAGNOSIS — E87.8 ELECTROLYTE ABNORMALITY: ICD-10-CM

## 2024-04-01 DIAGNOSIS — R79.89 ABNORMAL THYROID BLOOD TEST: ICD-10-CM

## 2024-04-01 PROCEDURE — 1126F AMNT PAIN NOTED NONE PRSNT: CPT | Mod: HCNC,CPTII,S$GLB, | Performed by: INTERNAL MEDICINE

## 2024-04-01 PROCEDURE — 1160F RVW MEDS BY RX/DR IN RCRD: CPT | Mod: HCNC,CPTII,S$GLB, | Performed by: INTERNAL MEDICINE

## 2024-04-01 PROCEDURE — 99999 PR PBB SHADOW E&M-EST. PATIENT-LVL III: CPT | Mod: PBBFAC,HCNC,, | Performed by: INTERNAL MEDICINE

## 2024-04-01 PROCEDURE — 3075F SYST BP GE 130 - 139MM HG: CPT | Mod: HCNC,CPTII,S$GLB, | Performed by: INTERNAL MEDICINE

## 2024-04-01 PROCEDURE — 3078F DIAST BP <80 MM HG: CPT | Mod: HCNC,CPTII,S$GLB, | Performed by: INTERNAL MEDICINE

## 2024-04-01 PROCEDURE — 1159F MED LIST DOCD IN RCRD: CPT | Mod: HCNC,CPTII,S$GLB, | Performed by: INTERNAL MEDICINE

## 2024-04-01 PROCEDURE — 99213 OFFICE O/P EST LOW 20 MIN: CPT | Mod: HCNC,S$GLB,, | Performed by: INTERNAL MEDICINE

## 2024-04-01 PROCEDURE — 1101F PT FALLS ASSESS-DOCD LE1/YR: CPT | Mod: HCNC,CPTII,S$GLB, | Performed by: INTERNAL MEDICINE

## 2024-04-01 PROCEDURE — 3288F FALL RISK ASSESSMENT DOCD: CPT | Mod: HCNC,CPTII,S$GLB, | Performed by: INTERNAL MEDICINE

## 2024-07-22 ENCOUNTER — HOSPITAL ENCOUNTER (OUTPATIENT)
Dept: RADIOLOGY | Facility: HOSPITAL | Age: 79
Discharge: HOME | End: 2024-07-22
Attending: FAMILY MEDICINE
Payer: MEDICARE

## 2024-07-22 ENCOUNTER — OFFICE VISIT (OUTPATIENT)
Dept: FAMILY MEDICINE | Facility: CLINIC | Age: 79
End: 2024-07-22
Payer: MEDICARE

## 2024-07-22 VITALS
OXYGEN SATURATION: 99 % | SYSTOLIC BLOOD PRESSURE: 118 MMHG | RESPIRATION RATE: 18 BRPM | WEIGHT: 145 LBS | DIASTOLIC BLOOD PRESSURE: 80 MMHG | HEIGHT: 66 IN | TEMPERATURE: 99 F | HEART RATE: 76 BPM | BODY MASS INDEX: 23.3 KG/M2

## 2024-07-22 DIAGNOSIS — G89.29 CHRONIC PAIN OF LEFT THUMB: Primary | ICD-10-CM

## 2024-07-22 DIAGNOSIS — M79.645 CHRONIC PAIN OF LEFT THUMB: Primary | ICD-10-CM

## 2024-07-22 DIAGNOSIS — M79.645 CHRONIC PAIN OF LEFT THUMB: ICD-10-CM

## 2024-07-22 DIAGNOSIS — G89.29 CHRONIC PAIN OF LEFT THUMB: ICD-10-CM

## 2024-07-22 PROCEDURE — 3079F DIAST BP 80-89 MM HG: CPT | Mod: HCNC,CPTII,S$GLB, | Performed by: FAMILY MEDICINE

## 2024-07-22 PROCEDURE — 99213 OFFICE O/P EST LOW 20 MIN: CPT | Mod: HCNC,S$GLB,, | Performed by: FAMILY MEDICINE

## 2024-07-22 PROCEDURE — 1125F AMNT PAIN NOTED PAIN PRSNT: CPT | Mod: HCNC,CPTII,S$GLB, | Performed by: FAMILY MEDICINE

## 2024-07-22 PROCEDURE — 99999 PR PBB SHADOW E&M-EST. PATIENT-LVL V: CPT | Mod: PBBFAC,HCNC,, | Performed by: FAMILY MEDICINE

## 2024-07-22 PROCEDURE — 3074F SYST BP LT 130 MM HG: CPT | Mod: HCNC,CPTII,S$GLB, | Performed by: FAMILY MEDICINE

## 2024-07-22 PROCEDURE — 1101F PT FALLS ASSESS-DOCD LE1/YR: CPT | Mod: HCNC,CPTII,S$GLB, | Performed by: FAMILY MEDICINE

## 2024-07-22 PROCEDURE — 1159F MED LIST DOCD IN RCRD: CPT | Mod: HCNC,CPTII,S$GLB, | Performed by: FAMILY MEDICINE

## 2024-07-22 PROCEDURE — 73130 X-RAY EXAM OF HAND: CPT | Mod: 26,LT,, | Performed by: RADIOLOGY

## 2024-07-22 PROCEDURE — 73130 X-RAY EXAM OF HAND: CPT | Mod: TC,LT

## 2024-07-22 PROCEDURE — 3288F FALL RISK ASSESSMENT DOCD: CPT | Mod: HCNC,CPTII,S$GLB, | Performed by: FAMILY MEDICINE

## 2024-07-22 RX ORDER — RSVPREF3 ANTIGEN 2 OF 2 120 MCG
1 VIAL (EA) INTRAMUSCULAR ONCE
Qty: 1 EACH | Refills: 0 | Status: SHIPPED | OUTPATIENT
Start: 2024-07-22 | End: 2024-07-22

## 2024-07-22 RX ORDER — ZOSTER VACCINE RECOMBINANT, ADJUVANTED 50 MCG/0.5
0.5 KIT INTRAMUSCULAR ONCE
Qty: 1 EACH | Refills: 0 | Status: SHIPPED | OUTPATIENT
Start: 2024-07-22 | End: 2024-07-22

## 2024-07-22 NOTE — PROGRESS NOTES
"Subjective:       Patient ID: Farrah Boston is a 79 y.o. female.    Chief Complaint: thumb pain      Patient is here with problems with her left thumb times 6-7 days she does not recall a precipitating incident can not recall any trauma.  She is currently taking care of her 101-year-old mother and doing a lot of lifting pulling and feels it may be contributing.  Since the thumb started hurting in his progressed over to the other fingers on left hand        Allergies and Medications:   Review of patient's allergies indicates:   Allergen Reactions    Morphine Shortness Of Breath     Patient states her" breathing stops"    Codeine Nausea And Vomiting     Current Outpatient Medications   Medication Sig Dispense Refill    atorvastatin (LIPITOR) 10 MG tablet TAKE 1 TABLET EVERY DAY 90 tablet 3    calcium-vitamin D3 (OS-LUIS 500 + D3) 500 mg(1,250mg) -200 unit per tablet Take 1 tablet by mouth 2 (two) times daily with meals.      coQ10, ubiquinol, 200 mg Cap Take 1 tablet by mouth Daily.      magnesium 200 mg Tab Take 400 mg by mouth once.      metoprolol succinate (TOPROL-XL) 25 MG 24 hr tablet TAKE 1 TABLET (25 MG TOTAL) BY MOUTH ONCE DAILY. 90 tablet 3    multivit-min/iron/folic/lutein (CENTRUM SILVER WOMEN ORAL) Take 1 tablet by mouth Daily.      polyethylene glycol 3350 (MIRALAX ORAL) Take by mouth. Take 3/4 capful by mouth nightly as needed      RSVPreF3 antigen 2 of 2 (AREXVY ANTIGEN COMPONENT) 120 mcg SusR Inject 1 Dose into the muscle once. for 1 dose 1 each 0    varicella-zoster gE-AS01B, PF, (SHINGRIX, PF,) 50 mcg/0.5 mL injection Inject 0.5 mLs into the muscle once. for 1 dose 1 each 0     No current facility-administered medications for this visit.       Family History:   Family History   Problem Relation Name Age of Onset    Hypertension Mother      Cirrhosis Father      Breast cancer Maternal Aunt  70    Breast cancer Maternal Aunt  80    Breast cancer Paternal Grandmother  35    Ovarian cancer Neg Hx   "       Social History:   Social History     Socioeconomic History    Marital status:      Spouse name: Mike Boston    Number of children: 3   Occupational History    Occupation: Journal Contributor/Writer     Comment: [employer]   Tobacco Use    Smoking status: Never    Smokeless tobacco: Never   Substance and Sexual Activity    Alcohol use: Yes     Comment: rarely    Drug use: No    Sexual activity: Yes     Partners: Male     Birth control/protection: None     Social Determinants of Health     Financial Resource Strain: Low Risk  (5/2/2023)    Overall Financial Resource Strain (CARDIA)     Difficulty of Paying Living Expenses: Not very hard   Food Insecurity: No Food Insecurity (5/2/2023)    Hunger Vital Sign     Worried About Running Out of Food in the Last Year: Never true     Ran Out of Food in the Last Year: Never true   Transportation Needs: No Transportation Needs (5/2/2023)    PRAPARE - Transportation     Lack of Transportation (Medical): No     Lack of Transportation (Non-Medical): No   Physical Activity: Inactive (5/2/2023)    Exercise Vital Sign     Days of Exercise per Week: 0 days     Minutes of Exercise per Session: 0 min   Stress: No Stress Concern Present (5/2/2023)    [employer] [place] [employer] - Occupational Stress Questionnaire     Feeling of Stress : Only a little   Housing Stability: Low Risk  (5/2/2023)    Housing Stability Vital Sign     Unable to Pay for Housing in the Last Year: No     Number of Places Lived in the Last Year: 1     Unstable Housing in the Last Year: No       Review of Systems    Objective:     Vitals:    07/22/24 1059   BP: 118/80   Pulse: 76   Resp: 18   Temp: 98.7 °F (37.1 °C)        Physical Exam  Musculoskeletal:        Hands:          Assessment:       1. Chronic pain of left thumb        Plan:       Farrah was seen today for thumb pain.    Diagnoses and all orders for this visit:    Chronic pain of left thumb  -     X-Ray Hand 2 View Left;  Future  -     Ambulatory referral/consult to Orthopedics; Future  -     varicella-zoster gE-AS01B, PF, (SHINGRIX, PF,) 50 mcg/0.5 mL injection; Inject 0.5 mLs into the muscle once. for 1 dose  -     RSVPreF3 antigen 2 of 2 (AREXVY ANTIGEN COMPONENT) 120 mcg SusR; Inject 1 Dose into the muscle once. for 1 dose         Follow up if symptoms worsen or fail to improve Dr. Lomeli..

## 2024-07-26 ENCOUNTER — OFFICE VISIT (OUTPATIENT)
Dept: ORTHOPEDICS | Facility: CLINIC | Age: 79
End: 2024-07-26
Payer: MEDICARE

## 2024-07-26 ENCOUNTER — TELEPHONE (OUTPATIENT)
Dept: ORTHOPEDICS | Facility: CLINIC | Age: 79
End: 2024-07-26
Payer: MEDICARE

## 2024-07-26 VITALS — BODY MASS INDEX: 23.31 KG/M2 | WEIGHT: 145.06 LBS | HEIGHT: 66 IN

## 2024-07-26 DIAGNOSIS — G89.29 CHRONIC PAIN OF LEFT THUMB: ICD-10-CM

## 2024-07-26 DIAGNOSIS — M79.645 CHRONIC PAIN OF LEFT THUMB: ICD-10-CM

## 2024-07-26 PROCEDURE — 99999 PR PBB SHADOW E&M-EST. PATIENT-LVL III: CPT | Mod: PBBFAC,HCNC,, | Performed by: ORTHOPAEDIC SURGERY

## 2024-07-26 RX ORDER — MELOXICAM 15 MG/1
15 TABLET ORAL DAILY
Qty: 30 TABLET | Refills: 1 | Status: SHIPPED | OUTPATIENT
Start: 2024-07-26

## 2024-07-26 NOTE — TELEPHONE ENCOUNTER
Called pt to come in sooner as Dr. Lopez has to go into surgery this afternoon.   Pt agreed to come in sooner

## 2024-07-26 NOTE — PROGRESS NOTES
Subjective:      Patient ID: Farrah Boston is a 79 y.o. female.    Chief Complaint: No chief complaint on file.    HPI  79-year-old female with a 7-10 day history of left wrist and thumb pain.  Denies any specific trauma.  She does assist in the care for her  and her mother and frequently does heavy lifting.  She is wearing an off-the-shelf brace which may help a bit.  She has had no other treatment for this.  ROS      Objective:    Ortho Exam     Constitutional:   Patient is alert  and oriented in no acute distress  HEENT:  normocephalic atraumatic; PERRL EOMI  Neck:  Supple without adenopathy  Cardiovascular:  Normal rate and rhythm  Pulmonary:  Normal respiratory effort normal chest wall expansion  Abdominal:  Nonprotuberant nondistended  Musculoskeletal:  Patient has diffuse tenderness of the radial aspect of her wrist and thumb  She has a mildly positive Finkelstein's minimally positive CMC grind  Intact skin, sensation, and brisk capillary refill of the digits  Neurological:  No focal defect; cranial nerves 2-12 grossly intact  Psychiatric/behavioral:  Mood and behavior normal      X-Ray Hand 3 View Left  Narrative: EXAMINATION:  XR HAND COMPLETE 3 VIEW LEFT    CLINICAL HISTORY:  Pain in left finger(s)    COMPARISON:  None    FINDINGS:  No acute fracture or malalignment of the left hand.  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  Impression: No acute osseous abnormality.    Mild thumb CMC osteoarthrosis.    Electronically signed by: Mumtaz Montoya  Date:    07/22/2024  Time:    12:06       My Radiographs Findings:    I have personally reviewed radiographs and concur with above findings    Assessment:       Encounter Diagnosis   Name Primary?    Chronic pain of left thumb          Plan:       I have discussed medical condition treatment options with her at length.  I have discussed continued use of her thumb splint generalized activity  "restrictions and some rehab exercises that we have provided for her.  I think her pain is likely multifactorial from some early CMC osteoarthritis and some wrist tendinitis.  We have discussed use of meloxicam GI cardiac and renal precautions were discussed.  Follow up in 3-4 weeks if symptoms fail to improve sooner if any questions or problems.        Past Medical History:   Diagnosis Date    Allergy     Anxiety     Depression     GERD (gastroesophageal reflux disease)     H/O colonoscopy 10/6/2022    Need for hepatitis C screening test 2/28/2018    Negative    Osteopenia     Reflux esophagitis     Seasonal allergies      Past Surgical History:   Procedure Laterality Date    ABDOMINAL ADHESION SURGERY      ANTERIOR VAGINAL REPAIR      BREAST BIOPSY      benign    CHOLECYSTECTOMY      HERNIA REPAIR      HYSTERECTOMY           Current Outpatient Medications:     atorvastatin (LIPITOR) 10 MG tablet, TAKE 1 TABLET EVERY DAY, Disp: 90 tablet, Rfl: 3    calcium-vitamin D3 (OS-LUIS 500 + D3) 500 mg(1,250mg) -200 unit per tablet, Take 1 tablet by mouth 2 (two) times daily with meals., Disp: , Rfl:     coQ10, ubiquinol, 200 mg Cap, Take 1 tablet by mouth Daily., Disp: , Rfl:     magnesium 200 mg Tab, Take 400 mg by mouth once., Disp: , Rfl:     metoprolol succinate (TOPROL-XL) 25 MG 24 hr tablet, TAKE 1 TABLET (25 MG TOTAL) BY MOUTH ONCE DAILY., Disp: 90 tablet, Rfl: 3    multivit-min/iron/folic/lutein (CENTRUM SILVER WOMEN ORAL), Take 1 tablet by mouth Daily., Disp: , Rfl:     polyethylene glycol 3350 (MIRALAX ORAL), Take by mouth. Take 3/4 capful by mouth nightly as needed, Disp: , Rfl:     Review of patient's allergies indicates:   Allergen Reactions    Morphine Shortness Of Breath     Patient states her" breathing stops"    Codeine Nausea And Vomiting       Family History   Problem Relation Name Age of Onset    Hypertension Mother      Cirrhosis Father      Breast cancer Maternal Aunt  70    Breast cancer Maternal Aunt  " 80    Breast cancer Paternal Grandmother  35    Ovarian cancer Neg Hx       Social History     Occupational History    Occupation: Journal Contributor/Writer     Comment: [employer]   Tobacco Use    Smoking status: Never    Smokeless tobacco: Never   Substance and Sexual Activity    Alcohol use: Yes     Comment: rarely    Drug use: No    Sexual activity: Yes     Partners: Male     Birth control/protection: None

## 2024-09-11 ENCOUNTER — LAB VISIT (OUTPATIENT)
Dept: LAB | Facility: HOSPITAL | Age: 79
End: 2024-09-11
Attending: INTERNAL MEDICINE
Payer: MEDICARE

## 2024-09-11 DIAGNOSIS — F41.8 MIXED ANXIETY DEPRESSIVE DISORDER: ICD-10-CM

## 2024-09-11 DIAGNOSIS — E87.8 ELECTROLYTE ABNORMALITY: ICD-10-CM

## 2024-09-11 DIAGNOSIS — E78.2 MIXED HYPERLIPIDEMIA: Chronic | ICD-10-CM

## 2024-09-11 LAB
ALT SERPL W/O P-5'-P-CCNC: 34 U/L (ref 10–44)
ANION GAP SERPL CALC-SCNC: 7 MMOL/L (ref 8–16)
BUN SERPL-MCNC: 17 MG/DL (ref 8–23)
CALCIUM SERPL-MCNC: 9.7 MG/DL (ref 8.7–10.5)
CHLORIDE SERPL-SCNC: 103 MMOL/L (ref 95–110)
CHOLEST SERPL-MCNC: 149 MG/DL (ref 120–199)
CHOLEST/HDLC SERPL: 3.2 {RATIO} (ref 2–5)
CO2 SERPL-SCNC: 30 MMOL/L (ref 23–29)
CREAT SERPL-MCNC: 0.7 MG/DL (ref 0.5–1.4)
EST. GFR  (NO RACE VARIABLE): >60 ML/MIN/1.73 M^2
GLUCOSE SERPL-MCNC: 88 MG/DL (ref 70–110)
HDLC SERPL-MCNC: 46 MG/DL (ref 40–75)
HDLC SERPL: 30.9 % (ref 20–50)
LDLC SERPL CALC-MCNC: 78.6 MG/DL (ref 63–159)
MAGNESIUM SERPL-MCNC: 2.2 MG/DL (ref 1.6–2.6)
NONHDLC SERPL-MCNC: 103 MG/DL
POTASSIUM SERPL-SCNC: 4.2 MMOL/L (ref 3.5–5.1)
SODIUM SERPL-SCNC: 140 MMOL/L (ref 136–145)
T4 FREE SERPL-MCNC: 0.7 NG/DL (ref 0.71–1.51)
TRIGL SERPL-MCNC: 122 MG/DL (ref 30–150)
TSH SERPL DL<=0.005 MIU/L-ACNC: 7.54 UIU/ML (ref 0.34–5.6)

## 2024-09-11 PROCEDURE — 84460 ALANINE AMINO (ALT) (SGPT): CPT | Performed by: INTERNAL MEDICINE

## 2024-09-11 PROCEDURE — 80061 LIPID PANEL: CPT | Performed by: INTERNAL MEDICINE

## 2024-09-11 PROCEDURE — 83735 ASSAY OF MAGNESIUM: CPT | Performed by: INTERNAL MEDICINE

## 2024-09-11 PROCEDURE — 84443 ASSAY THYROID STIM HORMONE: CPT | Performed by: INTERNAL MEDICINE

## 2024-09-11 PROCEDURE — 80048 BASIC METABOLIC PNL TOTAL CA: CPT | Performed by: INTERNAL MEDICINE

## 2024-09-11 PROCEDURE — 36415 COLL VENOUS BLD VENIPUNCTURE: CPT | Performed by: INTERNAL MEDICINE

## 2024-09-11 PROCEDURE — 84439 ASSAY OF FREE THYROXINE: CPT | Performed by: INTERNAL MEDICINE

## 2024-09-16 NOTE — PROGRESS NOTES
aSubjective:       Patient ID: Farrah Boston is a 79 y.o. female.    Chief Complaint: Thyroid Problem, Hyperlipidemia, Follow-up, and Dizziness     MAIN ISSUE IS THE THYROID -TSH SOMEWHAT ELEVATED AND FREE T4 SLIGHTLY LOW.  OFF BIOTIN.  CONSIDER 25-50 MCG OF LEVOTHYROXINE.  CONFIRMED FAMILY HISTORY SAW DR. HAMEED LAST TIME      Ms. Farrah Boston is a 79-year-old  female who comes for fup initial follow seems to have been set up in the month for tumor but she was back in September.  Recently she had seen our partner Dr. Christensen for for thumb pain and an x-ray was ordered.  Mild thumb CMC  Joint arthrosis. She was also recommended shingles and RSV vaccine. .      1. Hyperlipidemia:- currently on atorvastatin 10 mg and last year her cholesterol panel was good.  2.-her thyroid tests have been elevated.  Repeat thyroid test recently is within range good range.  But slightly on the higher side of normal.  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      Labs have been reviewed.  Basic chemistry showed a glucose of 88, BUN of 17 and creatinine of 0.7.  GFR is greater than 60.  Total cholesterol 149, triglycerides 122, HDL 46 and LDL 78.  TSH is slightly elevated at 7.53, ALT 34 and free T4 is slightly low at 0.70.    Probably she might have mild hypothyroidism and currently she has been off biotin supplements.    She does complain of some feeling of lightheadedness and feeling of mild dizziness whenever she was in a happy and emotional discussion with the friends.  Not particularly related to exertion or exercise.    She does admit that she might have some hearing loss and she has this lifelong tinnitus.  This has never been formally explored or evaluated.    She did  have a ?  Complete hysterectomy in past.  She did try to get a gynecological checkup but she states that her gynecologist has informed her that she has been aged out.          Thyroid Problem  Presents for initial visit. Patient reports no cold intolerance, constipation, diaphoresis, diarrhea or weight loss. The symptoms have been stable. There is no history of atrial fibrillation, diabetes or obesity.   Follow-up  Pertinent negatives include no abdominal pain, anorexia, arthralgias, chest pain, congestion, coughing, diaphoresis, fever, headaches, myalgias, nausea or vomiting.   Back Pain  This is a new problem. The current episode started more than 1 month ago. The problem occurs intermittently. The problem has been waxing and waning since onset. The pain is present in the gluteal. The quality of the pain is described as aching. Radiates to: Unknown if it is affiliated with anterior abdominal lower abdominal pain. The pain is at a severity of 3/10. The pain is mild. The pain is The same all the time. Pertinent negatives include no abdominal pain, bowel incontinence, chest pain, dysuria, fever, headaches, leg pain, tingling or weight loss. Risk factors include lack of exercise and sedentary lifestyle. She has tried bed rest for the symptoms. The treatment provided mild relief.   Abdominal Pain  This is a new problem. The current episode started more than 1 month ago. The onset quality is undetermined. The problem occurs constantly. The problem has been waxing and waning. The pain is located in the LLQ and RLQ (Lower abdomen). The pain is at a severity of 3/10. The pain is mild. The abdominal pain does not radiate. Pertinent negatives include no anorexia, arthralgias, belching, constipation, diarrhea, dysuria, fever, flatus, frequency, headaches, hematochezia, hematuria, melena, myalgias, nausea, vomiting or weight loss. Associated symptoms comments: Simply feeling unwell..   Dizziness:   Chronicity:   Recurrent  Onset:  More than 1 month ago  Progression since onset:  Waxing and waning  Frequency:  Every few weeks  Severity:  Mild  Duration:  1 minute  Dizziness characteristics:  Sensation of movement and off-balance   Associated symptoms: hearing loss and tinnitus.no fever, no headaches, no nausea, no vomiting, no diaphoresis and no chest pain.      Past Medical History:   Diagnosis Date    Allergy     Anxiety     Depression     GERD (gastroesophageal reflux disease)     H/O colonoscopy 10/6/2022    Need for hepatitis C screening test 2/28/2018    Negative    Osteopenia     Reflux esophagitis     Seasonal allergies      Social History     Socioeconomic History    Marital status:      Spouse name: Mike Boston    Number of children: 3   Occupational History    Occupation: Journal Contributor/Writer     Comment: [employer]   Tobacco Use    Smoking status: Never    Smokeless tobacco: Never   Substance and Sexual Activity    Alcohol use: Yes     Comment: rarely    Drug use: No    Sexual activity: Yes     Partners: Male     Birth control/protection: None     Social Determinants of Health     Financial Resource Strain: Low Risk  (5/2/2023)    Overall Financial Resource Strain (CARDIA)     Difficulty of Paying Living Expenses: Not very hard   Food Insecurity: No Food Insecurity (5/2/2023)    Hunger Vital Sign     Worried About Running Out of Food in the Last Year: Never true     Ran Out of Food in the Last Year: Never true   Transportation Needs: No Transportation Needs (5/2/2023)    PRAPARE - Transportation     Lack of Transportation (Medical): No     Lack of Transportation (Non-Medical): No   Physical Activity: Inactive (5/2/2023)    Exercise Vital Sign     Days of Exercise per Week: 0 days     Minutes of Exercise per Session: 0 min   Stress: No Stress Concern Present (5/2/2023)    [place] [employer] - Occupational Stress Questionnaire     Feeling of Stress : Only a little  "  Housing Stability: Low Risk  (5/2/2023)    Housing Stability Vital Sign     Unable to Pay for Housing in the Last Year: No     Number of Places Lived in the Last Year: 1     Unstable Housing in the Last Year: No     Past Surgical History:   Procedure Laterality Date    ABDOMINAL ADHESION SURGERY      ANTERIOR VAGINAL REPAIR      BREAST BIOPSY      benign    CHOLECYSTECTOMY      HERNIA REPAIR      HYSTERECTOMY       Family History   Problem Relation Name Age of Onset    Hypertension Mother      Cirrhosis Father      Breast cancer Maternal Aunt  70    Breast cancer Maternal Aunt  80    Breast cancer Paternal Grandmother  35    Ovarian cancer Neg Hx         Review of Systems   Constitutional:  Negative for activity change, diaphoresis, fever, unexpected weight change and weight loss.   HENT:  Positive for hearing loss and tinnitus. Negative for congestion, facial swelling and sinus pressure.    Eyes:  Negative for pain, discharge and visual disturbance.   Respiratory:  Negative for apnea, cough and wheezing.    Cardiovascular:  Negative for chest pain.   Gastrointestinal:  Negative for abdominal pain, anorexia, blood in stool, bowel incontinence, constipation, diarrhea, flatus, hematochezia, melena, nausea and vomiting.   Endocrine: Negative for cold intolerance, polydipsia and polyuria.        TSH level was somewhat elevated.   Genitourinary:  Negative for dysuria, frequency and hematuria.   Musculoskeletal:  Negative for arthralgias, back pain (doing good) and myalgias.   Neurological:  Positive for dizziness. Negative for tingling and headaches.         Objective:      Blood pressure 137/75, pulse 75, resp. rate 18, height 5' 6" (1.676 m), weight 65.8 kg (145 lb). Body mass index is 23.4 kg/m².  Physical Exam  Constitutional:       General: She is not in acute distress.     Appearance: She is well-developed. She is not ill-appearing or diaphoretic.      Comments: BMI is 23.40   Eyes:      General: No scleral " icterus.        Right eye: No discharge.         Left eye: No discharge.   Neck:      Thyroid: No thyromegaly.      Trachea: No tracheal deviation.   Cardiovascular:      Rate and Rhythm: Normal rate and regular rhythm.   Pulmonary:      Effort: Pulmonary effort is normal. No respiratory distress.      Breath sounds: Normal breath sounds. No stridor.   Abdominal:      General: There is no distension.      Palpations: Abdomen is soft.      Tenderness: There is no abdominal tenderness.   Musculoskeletal:         General: No deformity.      Cervical back: No tenderness.      Lumbar back: No swelling, edema, deformity, tenderness or bony tenderness. Normal range of motion.      Right lower leg: No edema.      Left lower leg: No edema.   Skin:     Coloration: Skin is not pale.      Findings: No erythema or rash.   Neurological:      Mental Status: She is alert. Mental status is at baseline.      Motor: No tremor or atrophy.      Coordination: Romberg sign positive. Coordination normal. Rapid alternating movements normal.      Gait: Gait and tandem walk normal.      Comments:  Romberg's is slightly positive at closing her eyes.  Turning her around herself did not provoke any lightheadedness or dizziness.   Psychiatric:         Mood and Affect: Mood is elated.         Behavior: Behavior normal.         Thought Content: Thought content normal.      Comments: Slightly anxious.           Assessment:       Lab Visit on 09/11/2024   Component Date Value Ref Range Status    Magnesium 09/11/2024 2.2  1.6 - 2.6 mg/dL Final    TSH 09/11/2024 7.537 (H)  0.340 - 5.600 uIU/mL Final    Cholesterol 09/11/2024 149  120 - 199 mg/dL Final    Triglycerides 09/11/2024 122  30 - 150 mg/dL Final    HDL 09/11/2024 46  40 - 75 mg/dL Final    LDL Cholesterol 09/11/2024 78.6  63.0 - 159.0 mg/dL Final    HDL/Cholesterol Ratio 09/11/2024 30.9  20.0 - 50.0 % Final    Total Cholesterol/HDL Ratio 09/11/2024 3.2  2.0 - 5.0 Final    Non-HDL Cholesterol  09/11/2024 103  mg/dL Final    Sodium 09/11/2024 140  136 - 145 mmol/L Final    Potassium 09/11/2024 4.2  3.5 - 5.1 mmol/L Final    Chloride 09/11/2024 103  95 - 110 mmol/L Final    CO2 09/11/2024 30 (H)  23 - 29 mmol/L Final    Glucose 09/11/2024 88  70 - 110 mg/dL Final    BUN 09/11/2024 17  8 - 23 mg/dL Final    Creatinine 09/11/2024 0.7  0.5 - 1.4 mg/dL Final    Calcium 09/11/2024 9.7  8.7 - 10.5 mg/dL Final    Anion Gap 09/11/2024 7 (L)  8 - 16 mmol/L Final    eGFR 09/11/2024 >60.0  >60 mL/min/1.73 m^2 Final    ALT 09/11/2024 34  10 - 44 U/L Final    Free T4 09/11/2024 0.70 (L)  0.71 - 1.51 ng/dL Final       1. Dizziness  Comments:  Mild lightheadedness on closing eyes. likely vestibular and may benefit from ENT or vestibular rehab.    2. Mixed hyperlipidemia  Comments:  Continue atorvastatin 10 mg.    3. Gastroesophageal reflux disease without esophagitis  Comments:  Continue to watch diet.  At this point not on any medications.  Overview:  03/24/2015:-Patient takes omeprazole usually once a day. Sometimes in the reflux gets worse she takes 2 a day.    10/5/17 esophagram normal. done due to dysphagia symptoms          4. Mixed anxiety depressive disorder  Overview:  Patient has some family issues in confidence from which she feels anxious and stressed. There are some unresolved marital issues.      5. Abnormal thyroid blood test  Comments:  TSH   Still elevated.  She was currently on biotin and still elevated.  Check free T4 and TSH in 4 months.  Orders:  -     T4, FREE; Future; Expected date: 12/18/2024  -     TSH; Future; Expected date: 12/18/2024    6. Cold intolerance  Comments:  TSH level is slightly elevated.  Check free T4 and TSH in 3-4 months if elevated consider catalino  Orders:  -     TSH; Future; Expected date: 12/18/2024    7. Hypothyroidism, unspecified type  Comments:  likely subclinical hypothyroidism.  Check free T4 and TSH next visit.  Not on any medications at this point.  Orders:  -     T4,  FREE; Future; Expected date: 12/18/2024    8. Chronic midline low back pain without sciatica  Comments:  Back pain seems to be better.  Takes meloxicam as needed.  Stretch exercises recommended.    9. Need for influenza vaccination  -     influenza (adjuvanted) (Fluad) 45 mcg/0.5 mL IM vaccine (> or = 66 yo) 0.5 mL    10. Screening mammogram for breast cancer  -     Mammo Digital Screening Bilat w/ Braeden; Future; Expected date: 10/18/2024    11. Family history of breast cancer  -     Mammo Digital Screening Bilat w/ Braeden; Future; Expected date: 10/18/2024             Plan:   Dizziness  Comments:  Mild lightheadedness on closing eyes. likely vestibular and may benefit from ENT or vestibular rehab.    Mixed hyperlipidemia  Comments:  Continue atorvastatin 10 mg.    Gastroesophageal reflux disease without esophagitis  Comments:  Continue to watch diet.  At this point not on any medications.    Mixed anxiety depressive disorder    Abnormal thyroid blood test  Comments:  TSH   Still elevated.  She was currently on biotin and still elevated.  Check free T4 and TSH in 4 months.  Orders:  -     T4, FREE; Future; Expected date: 12/18/2024  -     TSH; Future; Expected date: 12/18/2024    Cold intolerance  Comments:  TSH level is slightly elevated.  Check free T4 and TSH in 3-4 months if elevated consider catalino  Orders:  -     TSH; Future; Expected date: 12/18/2024    Hypothyroidism, unspecified type  Comments:  likely subclinical hypothyroidism.  Check free T4 and TSH next visit.  Not on any medications at this point.  Orders:  -     T4, FREE; Future; Expected date: 12/18/2024    Chronic midline low back pain without sciatica  Comments:  Back pain seems to be better.  Takes meloxicam as needed.  Stretch exercises recommended.    Need for influenza vaccination  -     influenza (adjuvanted) (Fluad) 45 mcg/0.5 mL IM vaccine (> or = 66 yo) 0.5 mL    Screening mammogram for breast cancer  -     Mammo Digital Screening Bilat w/ Braeden;  Future; Expected date: 10/18/2024    Family history of breast cancer  -     Mammo Digital Screening Bilat w/ Braeden; Future; Expected date: 10/18/2024    Miss Yan is doing good.  She keeps herself busy with different chores and activities.    Her TSH is elevated and will check another level in 4 months time before we make a commitments to start her on levothyroxine or thyroid supplementation.  There is a family history of thyroid condition on her side.  She does not feel overtly fatigued or tired.    Her only symptom might be mild dizziness or lightheadedness whenever she gets in his emotional conversation with her friends and well wishes.  This is not a heated or angry argument.  Interestingly when she exercises or climb stairs she does not get short of breath or lightheaded or dizzy.    She does have a longstanding mild hearing loss and tinnitus for years.  Her lightheadedness and dizziness might be vestibular.  On closing her eyes, Romberg's sign is mildly positive.  She does not have any slurred speech or symptoms of stroke at this point.  She does have a cardiology follow-up in future.    She would like to continue with mammogram screening at this point because she was maintaining otherwise good health.  There is a family history of breast cancers on her maternal aunt side even at advanced age.      She had a last bone density in 2023 and the next 1 will be due in 2026.  She will be notified about the mammogram results.    Today she is updated on the flu vaccination.    Next time I ask her to consider taking the RSV vaccine and consider update on COVID vaccine also.  Otherwise she was happy go lianne and would like to stir redman some vegetables which she enjoys.  She has cut down on the greasy, spicy food and meats.    Follow up in about 4 months (around 1/18/2025), or if symptoms worsen or fail to improve, for Hypertension/lipids.      Current Outpatient Medications:     atorvastatin (LIPITOR) 10 MG tablet, TAKE  1 TABLET EVERY DAY, Disp: 90 tablet, Rfl: 3    calcium-vitamin D3 (OS-LUIS 500 + D3) 500 mg(1,250mg) -200 unit per tablet, Take 1 tablet by mouth 2 (two) times daily with meals., Disp: , Rfl:     coQ10, ubiquinol, 200 mg Cap, Take 1 tablet by mouth Daily., Disp: , Rfl:     magnesium 200 mg Tab, Take 400 mg by mouth once., Disp: , Rfl:     meloxicam (MOBIC) 15 MG tablet, Take 1 tablet (15 mg total) by mouth once daily., Disp: 30 tablet, Rfl: 1    metoprolol succinate (TOPROL-XL) 25 MG 24 hr tablet, TAKE 1 TABLET (25 MG TOTAL) BY MOUTH ONCE DAILY., Disp: 90 tablet, Rfl: 3    multivit-min/iron/folic/lutein (CENTRUM SILVER WOMEN ORAL), Take 1 tablet by mouth Daily., Disp: , Rfl:     polyethylene glycol 3350 (MIRALAX ORAL), Take by mouth. Take 3/4 capful by mouth nightly as needed, Disp: , Rfl:   No current facility-administered medications for this visit.    Jun Lomeli     **Instructions for Vaccines**  Dear Patient,  Here are some important vaccines you should consider:-  1. **Flu Vaccine**:-  1.    - **Why**: Protects you from the flu, which can be very serious.  2.    - **When**: Every year, before the flu season starts.  3.    - **Where**: Available in our office.   2. **Pneumonia Vaccine**:-   -        -**Why**: Protects you from pneumonia, which can cause severe lung infections.      - **When**: Usually given once, but sometimes a booster is needed.      - **Where**: Available in our office.  3. **Covid Vaccine Update**:      - **Why**: Keeps you protected against the latest Covid-19 variants.      - **When**: Follow the current guidelines for updates and boosters.      - **Where**: Available at your pharmacy under Medicare Part D or Advantage Plan. Check for any co-payments.  4. **RSV Vaccine**:-      - **Why**: Protects against respiratory syncytial virus, which can cause serious lung infections.      - **When**: Recommended for older adults.      - **Where**: Available at your pharmacy under Medicare Part D or  Advantage Plan. Check for any co-payments.  5. **Shingles Vaccine**:      - **Why**: Prevents shingles, a painful skin rash.      - **When**: Usually given in two doses.      - **Where**: Available at your pharmacy under Medicare Part D or Advantage Plan. Check for any co-payments.  6. **TdAp Vaccine**:-      - **Why**: Protects against tetanus, diphtheria, and whooping cough.      - **When**: Every 10 years.      - **Where**: Available at your pharmacy under Medicare Part D or Advantage Plan. Check for any co-payments.  Please call our office if you have any questions or to schedule your flu and pneumonia vaccines. For other vaccines, visit your pharmacy and check with your Medicare plan for coverage details.  Stay healthy!  Sincerely,   Dr. Jun Lomlei MD   Internal Medicine   [address] [place] [id number]   Phone: [phone]   Fax: [phone]

## 2024-09-18 ENCOUNTER — PATIENT MESSAGE (OUTPATIENT)
Dept: FAMILY MEDICINE | Facility: CLINIC | Age: 79
End: 2024-09-18

## 2024-09-18 ENCOUNTER — OFFICE VISIT (OUTPATIENT)
Dept: FAMILY MEDICINE | Facility: CLINIC | Age: 79
End: 2024-09-18
Payer: MEDICARE

## 2024-09-18 VITALS
BODY MASS INDEX: 23.3 KG/M2 | HEIGHT: 66 IN | RESPIRATION RATE: 18 BRPM | WEIGHT: 145 LBS | SYSTOLIC BLOOD PRESSURE: 137 MMHG | DIASTOLIC BLOOD PRESSURE: 75 MMHG | HEART RATE: 75 BPM

## 2024-09-18 DIAGNOSIS — E78.2 MIXED HYPERLIPIDEMIA: ICD-10-CM

## 2024-09-18 DIAGNOSIS — R42 DIZZINESS: Primary | ICD-10-CM

## 2024-09-18 DIAGNOSIS — F41.8 MIXED ANXIETY DEPRESSIVE DISORDER: ICD-10-CM

## 2024-09-18 DIAGNOSIS — G89.29 CHRONIC MIDLINE LOW BACK PAIN WITHOUT SCIATICA: ICD-10-CM

## 2024-09-18 DIAGNOSIS — N61.0 BREAST INFLAMMATION: ICD-10-CM

## 2024-09-18 DIAGNOSIS — R79.89 ABNORMAL THYROID BLOOD TEST: ICD-10-CM

## 2024-09-18 DIAGNOSIS — M54.50 CHRONIC MIDLINE LOW BACK PAIN WITHOUT SCIATICA: ICD-10-CM

## 2024-09-18 DIAGNOSIS — N63.0 MASS OF BREAST, UNSPECIFIED LATERALITY: Primary | ICD-10-CM

## 2024-09-18 DIAGNOSIS — R68.89 COLD INTOLERANCE: ICD-10-CM

## 2024-09-18 DIAGNOSIS — Z12.31 SCREENING MAMMOGRAM FOR BREAST CANCER: ICD-10-CM

## 2024-09-18 DIAGNOSIS — K21.9 GASTROESOPHAGEAL REFLUX DISEASE WITHOUT ESOPHAGITIS: ICD-10-CM

## 2024-09-18 DIAGNOSIS — E03.9 HYPOTHYROIDISM, UNSPECIFIED TYPE: ICD-10-CM

## 2024-09-18 DIAGNOSIS — Z23 NEED FOR INFLUENZA VACCINATION: ICD-10-CM

## 2024-09-18 DIAGNOSIS — Z80.3 FAMILY HISTORY OF BREAST CANCER: ICD-10-CM

## 2024-09-18 PROCEDURE — 3078F DIAST BP <80 MM HG: CPT | Mod: HCNC,CPTII,S$GLB, | Performed by: INTERNAL MEDICINE

## 2024-09-18 PROCEDURE — 1126F AMNT PAIN NOTED NONE PRSNT: CPT | Mod: HCNC,CPTII,S$GLB, | Performed by: INTERNAL MEDICINE

## 2024-09-18 PROCEDURE — 3075F SYST BP GE 130 - 139MM HG: CPT | Mod: HCNC,CPTII,S$GLB, | Performed by: INTERNAL MEDICINE

## 2024-09-18 PROCEDURE — 99214 OFFICE O/P EST MOD 30 MIN: CPT | Mod: HCNC,S$GLB,, | Performed by: INTERNAL MEDICINE

## 2024-09-18 PROCEDURE — 99999 PR PBB SHADOW E&M-EST. PATIENT-LVL IV: CPT | Mod: PBBFAC,HCNC,, | Performed by: INTERNAL MEDICINE

## 2024-09-18 PROCEDURE — 1160F RVW MEDS BY RX/DR IN RCRD: CPT | Mod: HCNC,CPTII,S$GLB, | Performed by: INTERNAL MEDICINE

## 2024-09-18 PROCEDURE — 1101F PT FALLS ASSESS-DOCD LE1/YR: CPT | Mod: HCNC,CPTII,S$GLB, | Performed by: INTERNAL MEDICINE

## 2024-09-18 PROCEDURE — 1159F MED LIST DOCD IN RCRD: CPT | Mod: HCNC,CPTII,S$GLB, | Performed by: INTERNAL MEDICINE

## 2024-09-18 PROCEDURE — 90653 IIV ADJUVANT VACCINE IM: CPT | Mod: HCNC,S$GLB,, | Performed by: INTERNAL MEDICINE

## 2024-09-18 PROCEDURE — 3288F FALL RISK ASSESSMENT DOCD: CPT | Mod: HCNC,CPTII,S$GLB, | Performed by: INTERNAL MEDICINE

## 2024-09-18 PROCEDURE — G0008 ADMIN INFLUENZA VIRUS VAC: HCPCS | Mod: HCNC,S$GLB,, | Performed by: INTERNAL MEDICINE

## 2024-09-18 NOTE — TELEPHONE ENCOUNTER
Needs diag bulmaro order     Mass of breast, unspecified laterality  -     Mammo Digital Diagnostic W MARY W Contrast Bilateral; Future; Expected date: 10/01/2024    Breast inflammation  -     Mammo Digital Diagnostic W MARY W Contrast Bilateral; Future; Expected date: 10/01/2024      I have sent the orders for the bilateral diagnostic mammogram but I would feel more comfortable that this is ordered by a surgeon or a physician who is capable of examining the breast to put the proper diagnosis also in future.  I have just put a vague diagnosis which might not gel with the Medicare continuously in future.  Hopefully this  mammogram settles it.      Have a good night     Dr. Yani MABRY  ===View-only below this line===      ----- Message -----       From:Farrah Boston       Sent:9/18/2024  2:52 PM CDT         To:Jun Lomeli    Subject:Mammogram    Hi Dr. Lomeli,  [facility] called about the mammogram. They told me that I should have told you I have thickening on the lower portion of the  right breast that  I am concerned about. That may change the order for the mammogram. Sorry for not mentioning that.   God bless youFarrah

## 2024-09-26 DIAGNOSIS — N64.4 PAIN OF BOTH BREASTS: Primary | ICD-10-CM

## 2024-09-26 DIAGNOSIS — N63.0 PAINFUL LUMPY BREASTS: ICD-10-CM

## 2024-09-26 DIAGNOSIS — N64.4 PAINFUL LUMPY BREASTS: ICD-10-CM

## 2024-09-30 ENCOUNTER — TELEPHONE (OUTPATIENT)
Dept: FAMILY MEDICINE | Facility: CLINIC | Age: 79
End: 2024-09-30
Payer: MEDICARE

## 2024-09-30 DIAGNOSIS — N63.0 MASS OF BREAST, UNSPECIFIED LATERALITY: Primary | ICD-10-CM

## 2024-09-30 DIAGNOSIS — N61.0 BREAST INFLAMMATION: ICD-10-CM

## 2024-09-30 NOTE — TELEPHONE ENCOUNTER
Mass of breast, unspecified laterality  -     Mammo Digital Diagnostic Bilat with Braeden; Future; Expected date: 09/30/2024  -     US Breast Bilateral Limited; Future; Expected date: 09/30/2024    Breast inflammation  -     Mammo Digital Diagnostic Bilat with Braeden; Future; Expected date: 09/30/2024  -     US Breast Bilateral Limited; Future; Expected date: 09/30/2024       Order again sent.  This has been sent multiple times and I am not sure what is the right code.

## 2024-10-14 RX ORDER — METOPROLOL SUCCINATE 25 MG/1
25 TABLET, EXTENDED RELEASE ORAL DAILY
Qty: 90 TABLET | Refills: 3 | Status: SHIPPED | OUTPATIENT
Start: 2024-10-14

## 2024-10-14 NOTE — TELEPHONE ENCOUNTER
----- Message from Abigail sent at 10/11/2024  1:35 PM CDT -----  Type:  RX Refill Request    Who Called:  Pt    Refill or New Rx:  refill    RX Name and Strength:  metoprolol succinate (TOPROL-XL) 25 MG 24 hr tablet    How is the patient currently taking it? (ex. 1XDay):  as directed    Is this a 30 day or 90 day RX:  90    Preferred Pharmacy with phone number:    [facility] [address] [address]  [address] [address] [address]  Phone: [phone] Fax: [phone]    Local or Mail Order:  Local    Ordering Provider:  MARGIE Sanderson    Would the patient rather a call back or a response via [facility]?  Call back    Best Call Back Number:  [phone]    Additional Information:  Pt is needing her BP medication sent in. She is out for a week.  Please call back to advise. Thanks!

## 2024-10-15 ENCOUNTER — TELEPHONE (OUTPATIENT)
Dept: FAMILY MEDICINE | Facility: CLINIC | Age: 79
End: 2024-10-15
Payer: MEDICARE

## 2024-10-15 NOTE — TELEPHONE ENCOUNTER
----- Message from Shirin sent at 10/15/2024 10:59 AM CDT -----  Contact: self  Type: Needs Medical Advice  Who Called:  the patient     Pharmacy name and phone #:    [facility] - [address]  [address]  [place] [address]  Phone: [phone] Fax: [phone]    Best Call Back Number: [phone]  Additional Information: pt called to check on script     metoprolol succinate (TOPROL-XL) 25 MG 24 hr tablet    Status shows it was sent to [facility], pt states she did not ask for it to go there. Can the office please send it to [facility], pt has been out of script for over a month.  
----- Message from Shirin sent at 10/15/2024 10:59 AM CDT -----  Contact: self  Type: Needs Medical Advice  Who Called:  the patient     Pharmacy name and phone #:    [facility] [place] - [address]  [address]  [place] [address]  Phone: [phone] Fax: [phone]    Best Call Back Number: [phone]  Additional Information: pt called to check on script     metoprolol succinate (TOPROL-XL) 25 MG 24 hr tablet    Status shows it was sent to [facility], pt states she did not ask for it to go there. Can the office please send it to [facility], pt has been out of script for over a month.  
Rx was sent to [facility]  pt will call them   
[phone]

## 2024-10-22 ENCOUNTER — HOSPITAL ENCOUNTER (OUTPATIENT)
Dept: RADIOLOGY | Facility: HOSPITAL | Age: 79
Discharge: HOME | End: 2024-10-22
Attending: INTERNAL MEDICINE
Payer: MEDICARE

## 2024-10-22 DIAGNOSIS — N63.0 PAINFUL LUMPY BREASTS: ICD-10-CM

## 2024-10-22 DIAGNOSIS — N64.4 PAIN OF BOTH BREASTS: ICD-10-CM

## 2024-10-22 DIAGNOSIS — N61.0 BREAST INFLAMMATION: ICD-10-CM

## 2024-10-22 DIAGNOSIS — N64.4 PAINFUL LUMPY BREASTS: ICD-10-CM

## 2024-10-22 DIAGNOSIS — N63.0 MASS OF BREAST, UNSPECIFIED LATERALITY: ICD-10-CM

## 2024-10-22 PROCEDURE — 77062 BREAST TOMOSYNTHESIS BI: CPT | Mod: 26,,, | Performed by: RADIOLOGY

## 2024-10-22 PROCEDURE — 77066 DX MAMMO INCL CAD BI: CPT | Mod: TC,PO

## 2024-10-22 PROCEDURE — 76642 ULTRASOUND BREAST LIMITED: CPT | Mod: TC,PO,LT

## 2024-10-22 PROCEDURE — 77066 DX MAMMO INCL CAD BI: CPT | Mod: 26,,, | Performed by: RADIOLOGY

## 2024-10-22 PROCEDURE — 77062 BREAST TOMOSYNTHESIS BI: CPT | Mod: TC,PO

## 2024-11-04 ENCOUNTER — OFFICE VISIT (OUTPATIENT)
Dept: CARDIOLOGY | Facility: CLINIC | Age: 79
End: 2024-11-04
Payer: MEDICARE

## 2024-11-04 VITALS
WEIGHT: 147 LBS | RESPIRATION RATE: 16 BRPM | HEART RATE: 67 BPM | SYSTOLIC BLOOD PRESSURE: 122 MMHG | BODY MASS INDEX: 23.63 KG/M2 | DIASTOLIC BLOOD PRESSURE: 76 MMHG | HEIGHT: 66 IN | OXYGEN SATURATION: 98 %

## 2024-11-04 DIAGNOSIS — I70.0 AORTIC ATHEROSCLEROSIS: Primary | ICD-10-CM

## 2024-11-04 DIAGNOSIS — K21.9 GASTROESOPHAGEAL REFLUX DISEASE WITHOUT ESOPHAGITIS: ICD-10-CM

## 2024-11-04 DIAGNOSIS — E78.5 DYSLIPIDEMIA: ICD-10-CM

## 2024-11-04 DIAGNOSIS — I10 ESSENTIAL HYPERTENSION: ICD-10-CM

## 2024-11-04 PROCEDURE — 3288F FALL RISK ASSESSMENT DOCD: CPT | Mod: HCNC,CPTII,S$GLB, | Performed by: INTERNAL MEDICINE

## 2024-11-04 PROCEDURE — 1160F RVW MEDS BY RX/DR IN RCRD: CPT | Mod: HCNC,CPTII,S$GLB, | Performed by: INTERNAL MEDICINE

## 2024-11-04 PROCEDURE — 1159F MED LIST DOCD IN RCRD: CPT | Mod: HCNC,CPTII,S$GLB, | Performed by: INTERNAL MEDICINE

## 2024-11-04 PROCEDURE — 3078F DIAST BP <80 MM HG: CPT | Mod: HCNC,CPTII,S$GLB, | Performed by: INTERNAL MEDICINE

## 2024-11-04 PROCEDURE — 99214 OFFICE O/P EST MOD 30 MIN: CPT | Mod: HCNC,S$GLB,, | Performed by: INTERNAL MEDICINE

## 2024-11-04 PROCEDURE — 99999 PR PBB SHADOW E&M-EST. PATIENT-LVL III: CPT | Mod: PBBFAC,HCNC,, | Performed by: INTERNAL MEDICINE

## 2024-11-04 PROCEDURE — 1101F PT FALLS ASSESS-DOCD LE1/YR: CPT | Mod: HCNC,CPTII,S$GLB, | Performed by: INTERNAL MEDICINE

## 2024-11-04 PROCEDURE — 3074F SYST BP LT 130 MM HG: CPT | Mod: HCNC,CPTII,S$GLB, | Performed by: INTERNAL MEDICINE

## 2024-11-04 RX ORDER — ATORVASTATIN CALCIUM 10 MG/1
10 TABLET, FILM COATED ORAL DAILY
Qty: 90 TABLET | Refills: 3 | Status: SHIPPED | OUTPATIENT
Start: 2024-11-04

## 2024-11-04 RX ORDER — METOPROLOL SUCCINATE 25 MG/1
25 TABLET, EXTENDED RELEASE ORAL DAILY
Qty: 90 TABLET | Refills: 3 | Status: SHIPPED | OUTPATIENT
Start: 2024-11-04

## 2024-11-04 NOTE — PROGRESS NOTES
" Subjective:    Patient ID:  Farrah Boston is a 79 y.o. female patient here for evaluation Follow-up      History of Present Illness:   Care in his 79-year-old with history of essential hypertension dyslipidemia and GERD symptoms seeking follow-up evaluation.  She has been doing reasonably well no symptoms of angina noted only occasional shortness of breath with moderate effort noted.  She has been busy and unable to exercise as regularly as she did before.  No occurrence of any chest discomfort palpitations dizziness lightheadedness or weakness.  No swelling in the lower extremities  No cough or congestion no fevers or chills  She was still taking care of her mother who is 101yrsof age she was also take care of her  who has got pulmonary problems        Review of patient's allergies indicates:   Allergen Reactions    Morphine Shortness Of Breath     Patient states her" breathing stops"    Codeine Nausea And Vomiting       Past Medical History:   Diagnosis Date    Allergy     Anxiety     Depression     GERD (gastroesophageal reflux disease)     H/O colonoscopy 10/6/2022    Need for hepatitis C screening test 2/28/2018    Negative    Osteopenia     Reflux esophagitis     Seasonal allergies      Past Surgical History:   Procedure Laterality Date    ABDOMINAL ADHESION SURGERY      ANTERIOR VAGINAL REPAIR      BREAST BIOPSY      benign    CHOLECYSTECTOMY      HERNIA REPAIR      HYSTERECTOMY       Social History     Tobacco Use    Smoking status: Never    Smokeless tobacco: Never   Substance Use Topics    Alcohol use: Yes     Comment: rarely    Drug use: No        Review of Systems:    As noted in HPI in addition      REVIEW OF SYSTEMS  CARDIOVASCULAR: No recent chest pain, palpitations, arm, neck, or jaw pain  RESPIRATORY: No recent fever, cough chills, SOB or congestion  : No blood in the urine  GI: No Nausea, vomiting, constipation, diarrhea, blood, or reflux.  MUSCULOSKELETAL: No myalgias  NEURO: No " lightheadedness or dizziness  EYES: No Double vision, blurry, vision or headache              Objective        Vitals:    11/04/24 1009   BP: 122/76   Pulse: 67   Resp: 16       LIPIDS - LAST 2   Lab Results   Component Value Date    CHOL 149 09/11/2024    CHOL 137 04/28/2023    HDL 46 09/11/2024    HDL 42 04/28/2023    LDLCALC 78.6 09/11/2024    LDLCALC 80.4 04/28/2023    TRIG 122 09/11/2024    TRIG 73 04/28/2023    CHOLHDL 30.9 09/11/2024    CHOLHDL 30.7 04/28/2023       CBC - LAST 2  Lab Results   Component Value Date    WBC 5.36 08/27/2023    WBC 6.10 05/29/2023    RBC 4.47 08/27/2023    RBC 4.46 05/29/2023    HGB 13.2 08/27/2023    HGB 13.9 05/29/2023    HCT 41.1 08/27/2023    HCT 42.6 05/29/2023    MCV 92 08/27/2023    MCV 96 05/29/2023    MCH 29.5 08/27/2023    MCH 31.2 (H) 05/29/2023    MCHC 32.1 08/27/2023    MCHC 32.6 05/29/2023    RDW 12.3 08/27/2023    RDW 12.5 05/29/2023     08/27/2023     05/29/2023    MPV 9.3 08/27/2023    MPV 9.4 05/29/2023    GRAN 2.5 08/27/2023    GRAN 46.3 08/27/2023    LYMPH 2.1 08/27/2023    LYMPH 39.4 08/27/2023    MONO 0.5 08/27/2023    MONO 10.1 08/27/2023    BASO 0.03 08/27/2023    BASO 0.04 05/29/2023    NRBC 0 08/27/2023    NRBC 0 05/29/2023       CHEMISTRY & LIVER FUNCTION - LAST 2  Lab Results   Component Value Date     09/11/2024     08/27/2023    K 4.2 09/11/2024    K 4.1 08/27/2023     09/11/2024     08/27/2023    CO2 30 (H) 09/11/2024    CO2 22 (L) 08/27/2023    ANIONGAP 7 (L) 09/11/2024    ANIONGAP 12 08/27/2023    BUN 17 09/11/2024    BUN 17 08/27/2023    CREATININE 0.7 09/11/2024    CREATININE 0.9 08/27/2023    GLU 88 09/11/2024     (H) 08/27/2023    CALCIUM 9.7 09/11/2024    CALCIUM 9.0 08/27/2023    MG 2.2 09/11/2024    MG 2.5 (H) 03/01/2005    ALBUMIN 4.1 08/27/2023    ALBUMIN 4.4 05/29/2023    PROT 6.9 08/27/2023    PROT 7.1 05/29/2023    ALKPHOS 105 08/27/2023    ALKPHOS 91 05/29/2023    ALT 34 09/11/2024    ALT 31  08/27/2023    AST 20 08/27/2023    AST 24 05/29/2023    BILITOT 0.3 08/27/2023    BILITOT 0.6 05/29/2023        CARDIAC PROFILE - LAST 2  Lab Results   Component Value Date    BNP 20 08/27/2023    BNP 20 01/11/2022    TROPONINI 0.007 08/27/2023    TROPONINI <0.006 08/27/2023        COAGULATION - LAST 2  Lab Results   Component Value Date    LABPT 12.5 01/11/2022    INR 1.0 01/11/2022    INR 1.0 06/19/2009    APTT 27.4 06/19/2009       ENDOCRINE & PSA - LAST 2  Lab Results   Component Value Date    HGBA1C 5.5 01/11/2022    TSH 7.537 (H) 09/11/2024    TSH 4.533 03/26/2024        ECHOCARDIOGRAM RESULTS  No results found for this or any previous visit.      CURRENT/PREVIOUS VISIT EKG  Results for orders placed or performed during the hospital encounter of 08/27/23   EKG 12-lead    Collection Time: 08/27/23  8:51 PM    Narrative    Test Reason : R07.9,    Vent. Rate : 078 BPM     Atrial Rate : 078 BPM     P-R Int : 150 ms          QRS Dur : 084 ms      QT Int : 396 ms       P-R-T Axes : 052 -06 038 degrees     QTc Int : 451 ms    Normal sinus rhythm  Possible Anterior infarct ,age undetermined  Abnormal ECG clockwise rotation    When compared with ECG of 11-JAN-2022 11:23,  No significant change was found  Confirmed by Jonny MABRY, Mike WEAVER ([id number]) on 8/30/2023 12:34:14 PM    Referred By: [email or web address]   SELF           Confirmed By:Mike Fuller MD     No valid procedures specified.   Results for orders placed in visit on 08/29/23    Nuclear Stress Test    Interpretation Summary    The ECG portion of the study is negative for ischemia.    The patient reported no chest pain during the stress test.    During stress, rare PVCs are noted.    The nuclear portion of this study will be reported separately. The patient exercised for 8 minutes 30 seconds on a Beto protocol, corresponding to a functional capacity of 10.3 METS, achieving a peak heart rate of 147 bpm, which is 106 % of the age predicted maximum heart rate.    No valid  procedures specified.    PHYSICAL EXAM  CONSTITUTIONAL: Well built, well nourished in no apparent distress  NECK: no carotid bruit, no JVD  LUNGS: CTA  CHEST WALL: no tenderness  HEART: regular rate and rhythm, S1, S2 normal, no murmur, click, rub or gallop   ABDOMEN: soft, non-tender; bowel sounds normal; no masses,  no organomegaly  EXTREMITIES: Extremities normal, no edema, no calf tenderness noted  NEURO: AAO X 3    I HAVE REVIEWED :    The vital signs, nurses notes, and all the pertinent radiology and labs.        Current Outpatient Medications   Medication Instructions    atorvastatin (LIPITOR) 10 mg, Oral, Daily    calcium-vitamin D3 (OS-LUIS 500 + D3) 500 mg(1,250mg) -200 unit per tablet 1 tablet, 2 times daily with meals    coQ10, ubiquinol, 200 mg Cap 1 tablet, Daily    magnesium 400 mg, Once    meloxicam (MOBIC) 15 mg, Oral, Daily    metoprolol succinate (TOPROL-XL) 25 mg, Oral, Daily    multivit-min/iron/folic/lutein (CENTRUM SILVER WOMEN ORAL) 1 tablet, Daily    polyethylene glycol 3350 (MIRALAX ORAL) Oral, Take 3/4 capful by mouth nightly as needed          Assessment & Plan     1. Aortic atherosclerosis  Assessment & Plan:  Continue on risk factor modification she was maintaining on Lipitor 10 mg nightly continue on metoprolol 25 mg a day and she remains reasonably stable.      2. Essential hypertension  Assessment & Plan:  Blood pressure is well controlled at 122/76 mm Hg continue on low-salt diet and Toprol-XL 25 mg daily.      3. Dyslipidemia  Assessment & Plan:  She was on Lipitor 10 mg a day as well as Co Q10 200 mg nightly maintain the same regimen.      4. Gastroesophageal reflux disease without esophagitis  Overview:  03/24/2015:-Patient takes omeprazole usually once a day. Sometimes in the reflux gets worse she takes 2 a day.    10/5/17 esophagram normal. done due to dysphagia symptoms        Assessment & Plan:  Symptoms of GERD reasonably controlled.  And she was off all medicines  currently      Other orders  -     atorvastatin (LIPITOR) 10 MG tablet; Take 1 tablet (10 mg total) by mouth once daily.  Dispense: 90 tablet; Refill: 3  -     metoprolol succinate (TOPROL-XL) 25 MG 24 hr tablet; Take 1 tablet (25 mg total) by mouth once daily.  Dispense: 90 tablet; Refill: 3      Mobic she was using on p.r.n. basis whenever she was flare-up of her  left thumb    No follow-ups on file.

## 2024-11-04 NOTE — ASSESSMENT & PLAN NOTE
Continue on risk factor modification she was maintaining on Lipitor 10 mg nightly continue on metoprolol 25 mg a day and she remains reasonably stable.

## 2024-11-04 NOTE — ASSESSMENT & PLAN NOTE
Blood pressure is well controlled at 122/76 mm Hg continue on low-salt diet and Toprol-XL 25 mg daily.

## 2024-11-22 ENCOUNTER — TELEPHONE (OUTPATIENT)
Dept: CARDIOLOGY | Facility: CLINIC | Age: 79
End: 2024-11-22
Payer: MEDICARE

## 2024-11-22 NOTE — TELEPHONE ENCOUNTER
----- Message from Ariana sent at 11/22/2024 12:38 PM CST -----  Regarding: advise  Contact: pt  Type: Needs Medical Advice  Who Called:  patient   Symptoms (please be specific):    How long has patient had these symptoms:    Pharmacy name and phone #:    Best Call Back Number: [phone]    Additional Information: hi pt was just seen on the 4th she believes her apt on monday is made in error can u check and see MRN: [id number] NATHALY WASSERMAN

## 2024-11-22 NOTE — TELEPHONE ENCOUNTER
LVM, CANCELLING APPOINTMENT  SHE WAS JUST SEEN IN THE OFFICE.  SHE WILL COME IN WITH HER MOM MS. SHEPPARD

## 2025-01-24 ENCOUNTER — LAB VISIT (OUTPATIENT)
Dept: LAB | Facility: HOSPITAL | Age: 80
End: 2025-01-24
Attending: INTERNAL MEDICINE
Payer: MEDICARE

## 2025-01-24 DIAGNOSIS — R79.89 ABNORMAL THYROID BLOOD TEST: ICD-10-CM

## 2025-01-24 DIAGNOSIS — E03.9 HYPOTHYROIDISM, UNSPECIFIED TYPE: ICD-10-CM

## 2025-01-24 DIAGNOSIS — R68.89 COLD INTOLERANCE: ICD-10-CM

## 2025-01-24 LAB
T4 FREE SERPL-MCNC: 0.84 NG/DL (ref 0.71–1.51)
TSH SERPL DL<=0.005 MIU/L-ACNC: 2.87 UIU/ML (ref 0.4–4)

## 2025-01-24 PROCEDURE — 36415 COLL VENOUS BLD VENIPUNCTURE: CPT | Mod: HCNC | Performed by: INTERNAL MEDICINE

## 2025-01-24 PROCEDURE — 84439 ASSAY OF FREE THYROXINE: CPT | Mod: HCNC | Performed by: INTERNAL MEDICINE

## 2025-01-24 PROCEDURE — 84443 ASSAY THYROID STIM HORMONE: CPT | Mod: HCNC | Performed by: INTERNAL MEDICINE

## 2025-01-26 NOTE — PROGRESS NOTES
Subjective:       Patient ID: Farrah Boston is a 79 y.o. female.    Chief Complaint: Thyroid Problem and Labs Only    History of Present Illness    CHIEF COMPLAINT:  Farrah presents for a follow-up visit regarding thyroid function and to review recent lab results.    HPI:  Farrah was previously found to have abnormal thyroid function tests, initially thought to require medication. It was discovered that her biotin supplementation was interfering with the test results.     Farrah discontinued biotin as per medical advice, and subsequent thyroid function tests (T4 and TSH) have since normalized.     She does however has a family history of thyroid conditions.     Farrah is currently taking atorvastatin 20 mg for cholesterol, metoprolol 25 mg for blood pressure, calcium and vitamin D3 supplements, meloxicam for arthritis, magnesium supplements, and MiraLax for constipation.     She recently saw Dr. Dover for a routine visit, where it was decided to double the cholesterol medication dosage to further reduce cholesterol levels from the current 70-something to between 50 and 60, as a preventive measure against heart attacks. Farrah denies any current thyroid-related symptoms or pain in the thyroid area. She denies being on any thyroid medication.    MEDICATIONS:  Farrah is on Atorvastatin 20 mg for cholesterol management and Metoprolol 25 mg for blood pressure control. She is also taking calcium, vitamin D3, and magnesium supplements. For arthritis, she is on Meloxicam. Farrah is using MiraLax for constipation.    MEDICAL HISTORY:  Farrah has a history of hypercholesterolemia, hypertension, arthritis, and constipation. She received a flu vaccine on 9/18/24. Her pneumonia vaccine is up to date, and she received a tetanus vaccination in 2018.    FAMILY HISTORY:  Family history is significant for thyroid conditions.    TEST RESULTS:  Farrah's T4 and TSH levels were previously abnormal but have now normalized after discontinuing  biotin supplements.      ROS:  Musculoskeletal: -joint pain       Miss Farrah Campbell is a pleasant 79  female comes for.  Underlying medical issues as below-      1. Hyperlipidemia:- currently on atorvastatin 10 mg and last year her cholesterol panel was good.  2.-her thyroid tests have been elevated.  Repeat thyroid test recently is within range good range.  But slightly on the higher side of normal.  She used to take biotin supplements in past which could have interfered with the thyroid   Blood tests   3.-past complains of some vague abdominal anterior pain   4.-low back pain likely mechanical    Previous visit we had noted somewhat TSH and there was a concern about subclinical hypothyroidism .  After stopping biotin supplements (which is known to interfere with the thyroid assays) her thyroid tests seem to be doing okay.    She is currently not on medications for thyroid.    She had recently seen Dr. Bowen also with continuation of current medications lifestyle measures    She would also complained of some area of concern on the right breast for which mammogram and ultrasound were done.  No mass was noted and it was considered to be fibroglandular tissue.  Routine annual mammograms for thereafter recommended.  Ultrasound was done 10/15/2020          Past Medical History:   Diagnosis Date    Allergy     Anxiety     Depression     GERD (gastroesophageal reflux disease)     H/O colonoscopy 10/6/2022    Need for hepatitis C screening test 2/28/2018    Negative    Osteopenia     Reflux esophagitis     Seasonal allergies      Social History     Socioeconomic History    Marital status:      Spouse name: Mike Boston    Number of children: 3   Occupational History    Occupation: Journal Contributor/Writer     Comment: [employer]   Tobacco Use    Smoking status: Never    Smokeless tobacco: Never   Substance and Sexual Activity    Alcohol use: Yes     Comment: rarely    Drug use: No    Sexual  "activity: Yes     Partners: Male     Birth control/protection: None     Social Drivers of Health     Financial Resource Strain: Low Risk  (11/3/2024)    Overall Financial Resource Strain (CARDIA)     Difficulty of Paying Living Expenses: Not hard at all   Food Insecurity: No Food Insecurity (11/3/2024)    Hunger Vital Sign     Worried About Running Out of Food in the Last Year: Never true     Ran Out of Food in the Last Year: Never true   Transportation Needs: No Transportation Needs (5/2/2023)    PRAPARE - Transportation     Lack of Transportation (Medical): No     Lack of Transportation (Non-Medical): No   Physical Activity: Inactive (11/3/2024)    Exercise Vital Sign     Days of Exercise per Week: 0 days     Minutes of Exercise per Session: 0 min   Stress: No Stress Concern Present (11/3/2024)    [employer] [place] [employer] - Occupational Stress Questionnaire     Feeling of Stress : Not at all   Housing Stability: Low Risk  (5/2/2023)    Housing Stability Vital Sign     Unable to Pay for Housing in the Last Year: No     Number of Places Lived in the Last Year: 1     Unstable Housing in the Last Year: No     Past Surgical History:   Procedure Laterality Date    ABDOMINAL ADHESION SURGERY      ANTERIOR VAGINAL REPAIR      BREAST BIOPSY      benign    CHOLECYSTECTOMY      HERNIA REPAIR      HYSTERECTOMY       Family History   Problem Relation Name Age of Onset    Hypertension Mother      Cirrhosis Father      Breast cancer Maternal Aunt  70    Breast cancer Maternal Aunt  80    Breast cancer Paternal Grandmother  35    Ovarian cancer Neg Hx         Objective:      Physical Exam  Blood pressure 133/76, pulse 81, height 5' 6" (1.676 m), weight 65.3 kg (144 lb). Body mass index is 23.24 kg/m².  Physical Exam    General: No acute distress. Well-developed. Well-nourished.  Eyes: EOMI. Sclerae anicteric.  HENT: Normocephalic. Atraumatic. Nares patent. Moist oral mucosa.  Cardiovascular: Regular rate. Regular " rhythm. No murmurs. No rubs. No gallops. Normal S1, S2.  Respiratory: Normal respiratory effort. Clear to auscultation bilaterally. No rales. No rhonchi. No wheezing.  Abdomen: Soft. Non-tender. Non-distended. Normoactive bowel sounds.  Musculoskeletal: No  obvious deformity.  Extremities: No lower extremity edema.  Neurological: Alert & oriented x3. No slurred speech. Normal gait.  Psychiatric: Normal mood. Normal affect. Good insight. Good judgment.  Skin: Warm. Dry. No rash.  Neck: Nontender thyroid. No thyromegaly.              Assessment:       Lab Visit on 01/24/2025   Component Date Value Ref Range Status    Free T4 01/24/2025 0.84  0.71 - 1.51 ng/dL Final    TSH 01/24/2025 2.872  0.400 - 4.000 uIU/mL Final   1 Result Note       1 Patient Communication             Component Ref Range & Units 2 d ago  (1/24/25) 4 mo ago  (9/11/24) 10 mo ago  (3/26/24) 1 yr ago  (10/6/23) 1 yr ago  (4/28/23) 3 yr ago  (6/14/21) 15 yr ago  (11/25/09)   TSH 0.400 - 4.000 uIU/mL 2.872 7.537 High  R 4.533 R 6.401 High  R 8.140 High  R 5.360 R 3.53 R        1 Result Note       1 Patient Communication           Component Ref Range & Units 2 d ago  (1/24/25) 4 mo ago  (9/11/24) 1 yr ago  (10/6/23) 1 yr ago  (4/28/23) 19 yr ago  (12/13/05)   Free T4 0.71 - 1.51 ng/dL 0.84 0.70 Low  0.85 0.66 Low  1.10 R   Resulting Agency  [facility]             Component Ref Range & Units 6 d ago  (1/24/25) 4 mo ago  (9/11/24) 10 mo ago  (3/26/24) 1 yr ago  (10/6/23) 1 yr ago  (4/28/23) 3 yr ago  (6/14/21) 15 yr ago  (11/25/09)   TSH 0.400 - 4.000 uIU/mL 2.872 7.537 High  R 4.533 R 6.401 High  R 8.140 High  R 5.360 R 3.53 R       Assessment & Plan    IMPRESSION:  - Determined thyroid function has normalized without medication, likely due to patient discontinuing biotin supplementation  - Will recheck thyroid labs in 6 months to ensure continued normal range  - Noted family history of thyroid conditions, warranting ongoing monitoring  -  Acknowledged Dr. Dover's decision to increase cholesterol medication to target LDL between 50-60 for cardiovascular protection    E78.00 PURE HYPERCHOLESTEROLEMIA, UNSPECIFIED:  - Continued atorvastatin 20 mg for cholesterol management.  - Evaluated current cholesterol level, which is in the 70s.  - Assessed cholesterol levels and decided to increase medication dosage.  - Plan to double the atorvastatin dosage to lower cholesterol further.  - Noted that the patient is doing well on cholesterol medication, but aim to drive cholesterol levels down further.    I10 ESSENTIAL (PRIMARY) HYPERTENSION:  - Continued metoprolol 25 mg for blood pressure management.    M19.90 UNSPECIFIED OSTEOARTHRITIS, UNSPECIFIED SITE:  - Continued meloxicam for arthritis management.    K59.00 CONSTIPATION, UNSPECIFIED:  - Continued MiraLax for constipation management.  - Prescribed magnesium supplements in addition to MiraLax for constipation management.    Z23 ENCOUNTER FOR IMMUNIZATION:  - Reviewed patient's immunization status: flu vaccine received on 9/18/24, pneumonia vaccine up to date, and tetanus vaccination administered in 2018.  - Explained RSV vaccine benefits for protecting both elderly family members and young infants.  - Discussed that RSV vaccine is a once-in-a-lifetime immunization.  - Recommend RSV vaccine for the patient.  - Advised patient to inquire at pharmacy about availability of RSV vaccine.    M81.0 AGE-RELATED OSTEOPOROSIS WITHOUT CURRENT PATHOLOGICAL FRACTURE:  - Continued calcium and vitamin D3 supplements for osteoporosis management.    Z83.49 FAMILY HISTORY OF OTHER ENDOCRINE, NUTRITIONAL AND METABOLIC DISEASES:  - Acknowledged patient's family history of thyroid conditions.         Plan:   Mixed hyperlipidemia  -     Basic Metabolic Panel; Future; Expected date: 07/30/2025    Abnormal thyroid blood test  -     TSH; Future; Expected date: 01/31/2025    Gastroesophageal reflux disease without  esophagitis    Fatigue, unspecified type  -     TSH; Future; Expected date: 01/31/2025      Overall miss Yan is doing good.  She thankfully did not start on any thyroxine preparation and she kept herself off biotin and her T4 and TSH has normalized.  I will advise her not to take biotin supplements for another 6 months and I will check the labs again to be sure that we are in range  She does have some family history of thyroid conditions and I will keep my fingers crossed for that.    She takes her atorvastatin 20 mg for cholesterol   She takes her metoprolol 25 mg for blood pressure   She also takes her calcium and vitamin D3 supplements.  She takes meloxicam for arthritis also.  She also takes magnesium supplements and MiraLax for constipation.  Follow up in about 6 months (around 7/30/2025), or if symptoms worsen or fail to improve, for Thyroid, Lipids.      Current Outpatient Medications:     atorvastatin (LIPITOR) 20 MG tablet, Take 1 tablet (20 mg total) by mouth every evening., Disp: 90 tablet, Rfl: 3    calcium-vitamin D3 (OS-LUIS 500 + D3) 500 mg(1,250mg) -200 unit per tablet, Take 1 tablet by mouth 2 (two) times daily with meals., Disp: , Rfl:     coQ10, ubiquinol, 200 mg Cap, Take 1 tablet by mouth Daily., Disp: , Rfl:     magnesium 200 mg Tab, Take 400 mg by mouth once., Disp: , Rfl:     meloxicam (MOBIC) 15 MG tablet, Take 1 tablet (15 mg total) by mouth once daily., Disp: 30 tablet, Rfl: 1    metoprolol succinate (TOPROL-XL) 25 MG 24 hr tablet, Take 1 tablet (25 mg total) by mouth once daily., Disp: 90 tablet, Rfl: 3    multivit-min/iron/folic/lutein (CENTRUM SILVER WOMEN ORAL), Take 1 tablet by mouth Daily., Disp: , Rfl:     polyethylene glycol 3350 (MIRALAX ORAL), Take by mouth. Take 3/4 capful by mouth nightly as needed, Disp: , Rfl:     This note was generated with the assistance of ambient listening technology. Verbal consent was obtained by the patient and accompanying visitor(s) for the  recording of patient appointment to facilitate this note. I attest to having reviewed and edited the generated note for accuracy, though some syntax or spelling errors may persist. Please contact the author of this note for any clarification.      Jun Lomeli

## 2025-01-29 ENCOUNTER — OFFICE VISIT (OUTPATIENT)
Dept: CARDIOLOGY | Facility: CLINIC | Age: 80
End: 2025-01-29
Payer: MEDICARE

## 2025-01-29 VITALS
SYSTOLIC BLOOD PRESSURE: 132 MMHG | RESPIRATION RATE: 17 BRPM | HEIGHT: 66 IN | HEART RATE: 66 BPM | DIASTOLIC BLOOD PRESSURE: 74 MMHG | BODY MASS INDEX: 23.14 KG/M2 | OXYGEN SATURATION: 98 % | WEIGHT: 144 LBS

## 2025-01-29 DIAGNOSIS — I10 ESSENTIAL HYPERTENSION: ICD-10-CM

## 2025-01-29 DIAGNOSIS — E78.5 DYSLIPIDEMIA: ICD-10-CM

## 2025-01-29 DIAGNOSIS — I70.0 AORTIC ATHEROSCLEROSIS: Primary | ICD-10-CM

## 2025-01-29 PROCEDURE — 1160F RVW MEDS BY RX/DR IN RCRD: CPT | Mod: HCNC,CPTII,S$GLB, | Performed by: INTERNAL MEDICINE

## 2025-01-29 PROCEDURE — 99999 PR PBB SHADOW E&M-EST. PATIENT-LVL III: CPT | Mod: PBBFAC,HCNC,, | Performed by: INTERNAL MEDICINE

## 2025-01-29 PROCEDURE — 99213 OFFICE O/P EST LOW 20 MIN: CPT | Mod: HCNC,S$GLB,, | Performed by: INTERNAL MEDICINE

## 2025-01-29 PROCEDURE — 3078F DIAST BP <80 MM HG: CPT | Mod: HCNC,CPTII,S$GLB, | Performed by: INTERNAL MEDICINE

## 2025-01-29 PROCEDURE — 1159F MED LIST DOCD IN RCRD: CPT | Mod: HCNC,CPTII,S$GLB, | Performed by: INTERNAL MEDICINE

## 2025-01-29 PROCEDURE — 3075F SYST BP GE 130 - 139MM HG: CPT | Mod: HCNC,CPTII,S$GLB, | Performed by: INTERNAL MEDICINE

## 2025-01-29 PROCEDURE — 3288F FALL RISK ASSESSMENT DOCD: CPT | Mod: HCNC,CPTII,S$GLB, | Performed by: INTERNAL MEDICINE

## 2025-01-29 PROCEDURE — 1101F PT FALLS ASSESS-DOCD LE1/YR: CPT | Mod: HCNC,CPTII,S$GLB, | Performed by: INTERNAL MEDICINE

## 2025-01-29 RX ORDER — ATORVASTATIN CALCIUM 20 MG/1
20 TABLET, FILM COATED ORAL NIGHTLY
Qty: 90 TABLET | Refills: 3 | Status: SHIPPED | OUTPATIENT
Start: 2025-01-29 | End: 2026-01-29

## 2025-01-29 RX ORDER — METOPROLOL SUCCINATE 25 MG/1
25 TABLET, EXTENDED RELEASE ORAL DAILY
Qty: 90 TABLET | Refills: 3 | Status: SHIPPED | OUTPATIENT
Start: 2025-01-29

## 2025-01-29 NOTE — ASSESSMENT & PLAN NOTE
History of aortic arteriosclerosis.  Continue on aggressive lipid management she is on Lipitor 10 mg daily her last LDL cholesterol was 78.6 obtained in September.  May consider increasing it to 20 mg daily with an effort to get her LDL cholesterol below  70, maintain on low-fat low-cholesterol diet

## 2025-01-29 NOTE — PROGRESS NOTES
" Subjective:    Patient ID:  Farrah Boston is a 79 y.o. female patient here for evaluation Follow-up (Doing well)      History of Present Illness:     Patient 79-year-old with history of GE reflux and dyslipidemia seeking follow-up evaluation.  She has been doing very well with no occurrence of any angina shortness of breath PND orthopnea noted.  She has been dealing with lot of stress with work and family related issues but she is able cope with it much better now before.  And she is doing well overall no new cardiac symptoms are noted.  She remains very active        Review of patient's allergies indicates:   Allergen Reactions    Morphine Shortness Of Breath     Patient states her" breathing stops"    Codeine Nausea And Vomiting       Past Medical History:   Diagnosis Date    Allergy     Anxiety     Depression     GERD (gastroesophageal reflux disease)     H/O colonoscopy 10/6/2022    Need for hepatitis C screening test 2/28/2018    Negative    Osteopenia     Reflux esophagitis     Seasonal allergies      Past Surgical History:   Procedure Laterality Date    ABDOMINAL ADHESION SURGERY      ANTERIOR VAGINAL REPAIR      BREAST BIOPSY      benign    CHOLECYSTECTOMY      HERNIA REPAIR      HYSTERECTOMY       Social History     Tobacco Use    Smoking status: Never    Smokeless tobacco: Never   Substance Use Topics    Alcohol use: Yes     Comment: rarely    Drug use: No        Review of Systems:    As noted in HPI in addition      REVIEW OF SYSTEMS  CARDIOVASCULAR: No recent chest pain, palpitations, arm, neck, or jaw pain  RESPIRATORY: No recent fever, cough chills, SOB or congestion  : No blood in the urine  GI: No Nausea, vomiting, constipation, diarrhea, blood, or reflux.  MUSCULOSKELETAL: No myalgias  NEURO: No lightheadedness or dizziness  EYES: No Double vision, blurry, vision or headache              Objective        Vitals:    01/29/25 1105   BP: 132/74   Pulse: 66   Resp: 17       LIPIDS - LAST 2   Lab " Results   Component Value Date    CHOL 149 09/11/2024    CHOL 137 04/28/2023    HDL 46 09/11/2024    HDL 42 04/28/2023    LDLCALC 78.6 09/11/2024    LDLCALC 80.4 04/28/2023    TRIG 122 09/11/2024    TRIG 73 04/28/2023    CHOLHDL 30.9 09/11/2024    CHOLHDL 30.7 04/28/2023       CBC - LAST 2  Lab Results   Component Value Date    WBC 5.36 08/27/2023    WBC 6.10 05/29/2023    RBC 4.47 08/27/2023    RBC 4.46 05/29/2023    HGB 13.2 08/27/2023    HGB 13.9 05/29/2023    HCT 41.1 08/27/2023    HCT 42.6 05/29/2023    MCV 92 08/27/2023    MCV 96 05/29/2023    MCH 29.5 08/27/2023    MCH 31.2 (H) 05/29/2023    MCHC 32.1 08/27/2023    MCHC 32.6 05/29/2023    RDW 12.3 08/27/2023    RDW 12.5 05/29/2023     08/27/2023     05/29/2023    MPV 9.3 08/27/2023    MPV 9.4 05/29/2023    GRAN 2.5 08/27/2023    GRAN 46.3 08/27/2023    LYMPH 2.1 08/27/2023    LYMPH 39.4 08/27/2023    MONO 0.5 08/27/2023    MONO 10.1 08/27/2023    BASO 0.03 08/27/2023    BASO 0.04 05/29/2023    NRBC 0 08/27/2023    NRBC 0 05/29/2023       CHEMISTRY & LIVER FUNCTION - LAST 2  Lab Results   Component Value Date     09/11/2024     08/27/2023    K 4.2 09/11/2024    K 4.1 08/27/2023     09/11/2024     08/27/2023    CO2 30 (H) 09/11/2024    CO2 22 (L) 08/27/2023    ANIONGAP 7 (L) 09/11/2024    ANIONGAP 12 08/27/2023    BUN 17 09/11/2024    BUN 17 08/27/2023    CREATININE 0.7 09/11/2024    CREATININE 0.9 08/27/2023    GLU 88 09/11/2024     (H) 08/27/2023    CALCIUM 9.7 09/11/2024    CALCIUM 9.0 08/27/2023    MG 2.2 09/11/2024    MG 2.5 (H) 03/01/2005    ALBUMIN 4.1 08/27/2023    ALBUMIN 4.4 05/29/2023    PROT 6.9 08/27/2023    PROT 7.1 05/29/2023    ALKPHOS 105 08/27/2023    ALKPHOS 91 05/29/2023    ALT 34 09/11/2024    ALT 31 08/27/2023    AST 20 08/27/2023    AST 24 05/29/2023    BILITOT 0.3 08/27/2023    BILITOT 0.6 05/29/2023        CARDIAC PROFILE - LAST 2  Lab Results   Component Value Date    BNP 20 08/27/2023     BNP 20 01/11/2022    TROPONINI 0.007 08/27/2023    TROPONINI <0.006 08/27/2023        COAGULATION - LAST 2  Lab Results   Component Value Date    LABPT 12.5 01/11/2022    INR 1.0 01/11/2022    INR 1.0 06/19/2009    APTT 27.4 06/19/2009       ENDOCRINE & PSA - LAST 2  Lab Results   Component Value Date    HGBA1C 5.5 01/11/2022    TSH 2.872 01/24/2025    TSH 7.537 (H) 09/11/2024        ECHOCARDIOGRAM RESULTS  No results found for this or any previous visit.      CURRENT/PREVIOUS VISIT EKG  Results for orders placed or performed during the hospital encounter of 08/27/23   EKG 12-lead    Collection Time: 08/27/23  8:51 PM    Narrative    Test Reason : R07.9,    Vent. Rate : 078 BPM     Atrial Rate : 078 BPM     P-R Int : 150 ms          QRS Dur : 084 ms      QT Int : 396 ms       P-R-T Axes : 052 -06 038 degrees     QTc Int : 451 ms    Normal sinus rhythm  Possible Anterior infarct ,age undetermined  Abnormal ECG clockwise rotation    When compared with ECG of 11-JAN-2022 11:23,  No significant change was found  Confirmed by Mike Fuller MD ([id number]) on 8/30/2023 12:34:14 PM    Referred By: [email or web address]   SELF           Confirmed By:Mike Fuller MD     No valid procedures specified.   Results for orders placed in visit on 08/29/23    Nuclear Stress Test    Interpretation Summary    The ECG portion of the study is negative for ischemia.    The patient reported no chest pain during the stress test.    During stress, rare PVCs are noted.    The nuclear portion of this study will be reported separately. The patient exercised for 8 minutes 30 seconds on a Beto protocol, corresponding to a functional capacity of 10.3 METS, achieving a peak heart rate of 147 bpm, which is 106 % of the age predicted maximum heart rate.    No valid procedures specified.    PHYSICAL EXAM  CONSTITUTIONAL: Well built, well nourished in no apparent distress  NECK: no carotid bruit, no JVD  LUNGS: CTA  CHEST WALL: no tenderness  HEART: regular rate  and rhythm, S1, S2 normal, no murmur, click, rub or gallop   ABDOMEN: soft, non-tender; bowel sounds normal; no masses,  no organomegaly  EXTREMITIES: Extremities normal, no edema, no calf tenderness noted  NEURO: AAO X 3    I HAVE REVIEWED :    The vital signs, nurses notes, and all the pertinent radiology and labs.        Current Outpatient Medications   Medication Instructions    atorvastatin (LIPITOR) 20 mg, Oral, Nightly    calcium-vitamin D3 (OS-LUIS 500 + D3) 500 mg(1,250mg) -200 unit per tablet 1 tablet, 2 times daily with meals    coQ10, ubiquinol, 200 mg Cap 1 tablet, Daily    magnesium 400 mg, Once    meloxicam (MOBIC) 15 mg, Oral, Daily    metoprolol succinate (TOPROL-XL) 25 mg, Oral, Daily    multivit-min/iron/folic/lutein (CENTRUM SILVER WOMEN ORAL) 1 tablet, Daily    polyethylene glycol 3350 (MIRALAX ORAL) Take by mouth. Take 3/4 capful by mouth nightly as needed          Assessment & Plan     1. Aortic atherosclerosis  Assessment & Plan:  History of aortic arteriosclerosis.  Continue on aggressive lipid management she is on Lipitor 10 mg daily her last LDL cholesterol was 78.6 obtained in September.  May consider increasing it to 20 mg daily with an effort to get her LDL cholesterol below  70, maintain on low-fat low-cholesterol diet    Orders:  -     Lipid Panel; Future; Expected date: 04/29/2025  -     Hepatic Function Panel; Future; Expected date: 04/29/2025    2. Essential hypertension  Assessment & Plan:  Blood pressure is stable at 130 2/74 mm Hg maintain on low doses of metoprolol succinate 25 mg once a day.  Continue the same      3. Dyslipidemia  Assessment & Plan:  Maintain on Lipitor as above increase the dose to 20 mg daily low-fat low-cholesterol repeat labs    Orders:  -     Lipid Panel; Future; Expected date: 04/29/2025  -     Hepatic Function Panel; Future; Expected date: 04/29/2025    Other orders  -     atorvastatin (LIPITOR) 20 MG tablet; Take 1 tablet (20 mg total) by mouth every  evening.  Dispense: 90 tablet; Refill: 3  -     metoprolol succinate (TOPROL-XL) 25 MG 24 hr tablet; Take 1 tablet (25 mg total) by mouth once daily.  Dispense: 90 tablet; Refill: 3          No follow-ups on file.

## 2025-01-29 NOTE — ASSESSMENT & PLAN NOTE
Blood pressure is stable at 130 2/74 mm Hg maintain on low doses of metoprolol succinate 25 mg once a day.  Continue the same

## 2025-01-30 ENCOUNTER — OFFICE VISIT (OUTPATIENT)
Dept: FAMILY MEDICINE | Facility: CLINIC | Age: 80
End: 2025-01-30
Payer: MEDICARE

## 2025-01-30 VITALS
BODY MASS INDEX: 23.14 KG/M2 | WEIGHT: 144 LBS | HEIGHT: 66 IN | SYSTOLIC BLOOD PRESSURE: 133 MMHG | DIASTOLIC BLOOD PRESSURE: 76 MMHG | HEART RATE: 81 BPM

## 2025-01-30 DIAGNOSIS — E78.2 MIXED HYPERLIPIDEMIA: Primary | ICD-10-CM

## 2025-01-30 DIAGNOSIS — R53.83 FATIGUE, UNSPECIFIED TYPE: ICD-10-CM

## 2025-01-30 DIAGNOSIS — R79.89 ABNORMAL THYROID BLOOD TEST: ICD-10-CM

## 2025-01-30 DIAGNOSIS — K21.9 GASTROESOPHAGEAL REFLUX DISEASE WITHOUT ESOPHAGITIS: ICD-10-CM

## 2025-01-30 PROCEDURE — 1101F PT FALLS ASSESS-DOCD LE1/YR: CPT | Mod: HCNC,CPTII,S$GLB, | Performed by: INTERNAL MEDICINE

## 2025-01-30 PROCEDURE — 99999 PR PBB SHADOW E&M-EST. PATIENT-LVL III: CPT | Mod: PBBFAC,HCNC,, | Performed by: INTERNAL MEDICINE

## 2025-01-30 PROCEDURE — 1126F AMNT PAIN NOTED NONE PRSNT: CPT | Mod: HCNC,CPTII,S$GLB, | Performed by: INTERNAL MEDICINE

## 2025-01-30 PROCEDURE — 3288F FALL RISK ASSESSMENT DOCD: CPT | Mod: HCNC,CPTII,S$GLB, | Performed by: INTERNAL MEDICINE

## 2025-01-30 PROCEDURE — 3078F DIAST BP <80 MM HG: CPT | Mod: HCNC,CPTII,S$GLB, | Performed by: INTERNAL MEDICINE

## 2025-01-30 PROCEDURE — 99213 OFFICE O/P EST LOW 20 MIN: CPT | Mod: HCNC,S$GLB,, | Performed by: INTERNAL MEDICINE

## 2025-01-30 PROCEDURE — 1159F MED LIST DOCD IN RCRD: CPT | Mod: HCNC,CPTII,S$GLB, | Performed by: INTERNAL MEDICINE

## 2025-01-30 PROCEDURE — 3075F SYST BP GE 130 - 139MM HG: CPT | Mod: HCNC,CPTII,S$GLB, | Performed by: INTERNAL MEDICINE

## 2025-01-30 PROCEDURE — 1160F RVW MEDS BY RX/DR IN RCRD: CPT | Mod: HCNC,CPTII,S$GLB, | Performed by: INTERNAL MEDICINE

## 2025-02-04 ENCOUNTER — TELEPHONE (OUTPATIENT)
Dept: CARDIOLOGY | Facility: CLINIC | Age: 80
End: 2025-02-04
Payer: MEDICARE

## 2025-02-04 NOTE — TELEPHONE ENCOUNTER
----- Message from Gloria sent at 2/4/2025  4:38 PM CST -----  Regarding: advice  Type:  Needs Medical Advice    Who Called: pt    Best Call Back Number: [phone]      Additional Information: pt st that she's having trouble taking two of her atorvastatin (LIPITOR) 20 MG tablet. She st that her eyes & mouth gets dry. What would dr like for her to do?  please call to discuss.

## 2025-02-07 NOTE — TELEPHONE ENCOUNTER
Spoke with pt Per NENITA Max, NP  go down to 1 tablet daily on Lipitor. Pt understood with no questions or concerns.

## 2025-02-24 DIAGNOSIS — Z00.00 ENCOUNTER FOR MEDICARE ANNUAL WELLNESS EXAM: ICD-10-CM

## 2025-05-21 ENCOUNTER — PATIENT MESSAGE (OUTPATIENT)
Dept: FAMILY MEDICINE | Facility: CLINIC | Age: 80
End: 2025-05-21
Payer: MEDICARE

## 2025-05-21 RX ORDER — ATORVASTATIN CALCIUM 20 MG/1
20 TABLET, FILM COATED ORAL NIGHTLY
Qty: 90 TABLET | Refills: 3 | Status: SHIPPED | OUTPATIENT
Start: 2025-05-21 | End: 2026-05-21

## 2025-05-21 NOTE — TELEPHONE ENCOUNTER
----- Message from Gloria sent at 5/21/2025  9:20 AM CDT -----  Regarding: refill  Type:  RX Refill RequestWho Called: ptRefill or New Rx:refillRX Name and Strength: atorvastatin (LIPITOR) 20 MG tablet 90 tablet 3 1/29/2025 1/29/2026 Sig - Route: Take 1 tablet (20 mg total) by mouth every evening. - Oral Sent to pharmacy as: atorvastatin (LIPITOR) 20 MG tablet E-Prescribing Status: Receipt confirmed by pharmacy (1/29/2025 12:10 PM CST) How is the patient currently taking it? (ex. 1XDay):see aboveIs this a 30 day or 90 day RX:see abovePreferred Pharmacy with phone number:[facility] [id number] [facility] [id number] [address] [address]Vjmzl: [phone] Fax: [phone] or Mail Order:local Ordering Provider:behtala Best Call Back Number:[phone] Information: pt st she is out of meds, and that she would like to go back to the 10mg, because the 20ms are two strong.  Please call to discuss.

## 2025-07-17 ENCOUNTER — OFFICE VISIT (OUTPATIENT)
Dept: CARDIOLOGY | Facility: CLINIC | Age: 80
End: 2025-07-17
Payer: MEDICARE

## 2025-07-17 VITALS — HEART RATE: 71 BPM | OXYGEN SATURATION: 98 % | WEIGHT: 147.38 LBS | BODY MASS INDEX: 23.79 KG/M2

## 2025-07-17 DIAGNOSIS — I10 ESSENTIAL HYPERTENSION: Primary | ICD-10-CM

## 2025-07-17 DIAGNOSIS — R42 EPISODIC LIGHTHEADEDNESS: ICD-10-CM

## 2025-07-17 DIAGNOSIS — E78.5 DYSLIPIDEMIA: ICD-10-CM

## 2025-07-17 PROCEDURE — 3288F FALL RISK ASSESSMENT DOCD: CPT | Mod: CPTII,HCNC,S$GLB, | Performed by: INTERNAL MEDICINE

## 2025-07-17 PROCEDURE — 1159F MED LIST DOCD IN RCRD: CPT | Mod: CPTII,HCNC,S$GLB, | Performed by: INTERNAL MEDICINE

## 2025-07-17 PROCEDURE — 1126F AMNT PAIN NOTED NONE PRSNT: CPT | Mod: CPTII,HCNC,S$GLB, | Performed by: INTERNAL MEDICINE

## 2025-07-17 PROCEDURE — 99999 PR PBB SHADOW E&M-EST. PATIENT-LVL III: CPT | Mod: PBBFAC,HCNC,, | Performed by: INTERNAL MEDICINE

## 2025-07-17 PROCEDURE — 1101F PT FALLS ASSESS-DOCD LE1/YR: CPT | Mod: CPTII,HCNC,S$GLB, | Performed by: INTERNAL MEDICINE

## 2025-07-17 PROCEDURE — 99214 OFFICE O/P EST MOD 30 MIN: CPT | Mod: HCNC,S$GLB,, | Performed by: INTERNAL MEDICINE

## 2025-07-17 PROCEDURE — 1160F RVW MEDS BY RX/DR IN RCRD: CPT | Mod: CPTII,HCNC,S$GLB, | Performed by: INTERNAL MEDICINE

## 2025-07-17 RX ORDER — METOPROLOL SUCCINATE 25 MG/1
25 TABLET, EXTENDED RELEASE ORAL DAILY
Qty: 90 TABLET | Refills: 3 | Status: SHIPPED | OUTPATIENT
Start: 2025-07-17

## 2025-07-17 RX ORDER — ATORVASTATIN CALCIUM 10 MG/1
15 TABLET, FILM COATED ORAL NIGHTLY
Qty: 135 TABLET | Refills: 3 | Status: SHIPPED | OUTPATIENT
Start: 2025-07-17 | End: 2026-07-17

## 2025-07-17 RX ORDER — MAGNESIUM 200 MG
400 TABLET ORAL ONCE
Qty: 90 EACH | Refills: 3 | Status: SHIPPED | OUTPATIENT
Start: 2025-07-17 | End: 2025-07-17

## 2025-07-17 NOTE — ASSESSMENT & PLAN NOTE
Patient had couple of episodes of lightheadedness but he has sporadic and infrequent.  Recommend periodic monitoring keep herself adequately hydrated  Recommend to obtain a event monitor if this has symptoms occur with increasing frequency prolonged in nature

## 2025-07-17 NOTE — ASSESSMENT & PLAN NOTE
She is presently on low doses of Lipitor maintain the same last lipid levels are stable encouraged her to continue on low-fat low-cholesterol diet repeat blood work before her next visit

## 2025-07-17 NOTE — PROGRESS NOTES
" Subjective:    Patient ID:  Farrah Boston is a 80 y.o. female patient here for evaluation Follow-up and Medication Dose Change      History of Present Illness:     IS A 80-YEAR-OLD WITH HISTORY OF ANXIETY DISORDER AND PERIODIC EPISODES OF TRANSIENT LIGHTHEADEDNESS IS NOTED WHILE DRIVING AND SHE HAD TO OCCASIONS OF THE SAME.  SHE IT WAS ONLY MOMENTARY AND QUICKLY PASSED NO HEADACHES PALPITATIONS ASSOCIATED WITH THIS HAS NO CHEST DISCOMFORT NOTED.  SHE HAD SPONTANEOUS RESOLUTION OF THE SAME ON EACH OCCASION    No nausea vomiting no blood in the stools no black stools      Review of patient's allergies indicates:   Allergen Reactions    Morphine Shortness Of Breath     Patient states her" breathing stops"    Codeine Nausea And Vomiting       Past Medical History:   Diagnosis Date    Allergy     Anxiety     Depression     GERD (gastroesophageal reflux disease)     H/O colonoscopy 10/6/2022    Need for hepatitis C screening test 2/28/2018    Negative    Osteopenia     Reflux esophagitis     Seasonal allergies      Past Surgical History:   Procedure Laterality Date    ABDOMINAL ADHESION SURGERY      ANTERIOR VAGINAL REPAIR      BREAST BIOPSY      benign    CHOLECYSTECTOMY      HERNIA REPAIR      HYSTERECTOMY       Social History[1]     Review of Systems:    As noted in HPI in addition      REVIEW OF SYSTEMS  CARDIOVASCULAR: No recent chest pain, palpitations, arm, neck, or jaw pain  RESPIRATORY: No recent fever, cough chills, SOB or congestion  : No blood in the urine  GI: No Nausea, vomiting, constipation, diarrhea, blood, or reflux.  MUSCULOSKELETAL: No myalgias  NEURO: No lightheadedness or dizziness  EYES: No Double vision, blurry, vision or headache              Objective        Vitals:    07/17/25 1041   BP: (P) 118/62   Pulse: 71       LIPIDS - LAST 2   Lab Results   Component Value Date    CHOL 149 09/11/2024    CHOL 137 04/28/2023    HDL 46 09/11/2024    HDL 42 04/28/2023    LDLCALC 78.6 09/11/2024    LDLCALC " 80.4 04/28/2023    TRIG 122 09/11/2024    TRIG 73 04/28/2023    CHOLHDL 30.9 09/11/2024    CHOLHDL 30.7 04/28/2023       CBC - LAST 2  Lab Results   Component Value Date    WBC 5.36 08/27/2023    WBC 6.10 05/29/2023    RBC 4.47 08/27/2023    RBC 4.46 05/29/2023    HGB 13.2 08/27/2023    HGB 13.9 05/29/2023    HCT 41.1 08/27/2023    HCT 42.6 05/29/2023    MCV 92 08/27/2023    MCV 96 05/29/2023    MCH 29.5 08/27/2023    MCH 31.2 (H) 05/29/2023    MCHC 32.1 08/27/2023    MCHC 32.6 05/29/2023    RDW 12.3 08/27/2023    RDW 12.5 05/29/2023     08/27/2023     05/29/2023    MPV 9.3 08/27/2023    MPV 9.4 05/29/2023    GRAN 2.5 08/27/2023    GRAN 46.3 08/27/2023    LYMPH 2.1 08/27/2023    LYMPH 39.4 08/27/2023    MONO 0.5 08/27/2023    MONO 10.1 08/27/2023    BASO 0.03 08/27/2023    BASO 0.04 05/29/2023    NRBC 0 08/27/2023    NRBC 0 05/29/2023       CHEMISTRY & LIVER FUNCTION - LAST 2  Lab Results   Component Value Date     09/11/2024     08/27/2023    K 4.2 09/11/2024    K 4.1 08/27/2023     09/11/2024     08/27/2023    CO2 30 (H) 09/11/2024    CO2 22 (L) 08/27/2023    ANIONGAP 7 (L) 09/11/2024    ANIONGAP 12 08/27/2023    BUN 17 09/11/2024    BUN 17 08/27/2023    CREATININE 0.7 09/11/2024    CREATININE 0.9 08/27/2023    GLU 88 09/11/2024     (H) 08/27/2023    CALCIUM 9.7 09/11/2024    CALCIUM 9.0 08/27/2023    MG 2.2 09/11/2024    MG 2.5 (H) 03/01/2005    ALBUMIN 4.1 08/27/2023    ALBUMIN 4.4 05/29/2023    PROT 6.9 08/27/2023    PROT 7.1 05/29/2023    ALKPHOS 105 08/27/2023    ALKPHOS 91 05/29/2023    ALT 34 09/11/2024    ALT 31 08/27/2023    AST 20 08/27/2023    AST 24 05/29/2023    BILITOT 0.3 08/27/2023    BILITOT 0.6 05/29/2023        CARDIAC PROFILE - LAST 2  Lab Results   Component Value Date    BNP 20 08/27/2023    BNP 20 01/11/2022    TROPONINI 0.007 08/27/2023    TROPONINI <0.006 08/27/2023        COAGULATION - LAST 2  Lab Results   Component Value Date    LABPT 12.5  01/11/2022    INR 1.0 01/11/2022    INR 1.0 06/19/2009    APTT 27.4 06/19/2009       ENDOCRINE & PSA - LAST 2  Lab Results   Component Value Date    HGBA1C 5.5 01/11/2022    TSH 2.872 01/24/2025    TSH 7.537 (H) 09/11/2024        ECHOCARDIOGRAM RESULTS  No results found for this or any previous visit.      CURRENT/PREVIOUS VISIT EKG  Results for orders placed or performed during the hospital encounter of 08/27/23   EKG 12-lead    Collection Time: 08/27/23  8:51 PM    Narrative    Test Reason : R07.9,    Vent. Rate : 078 BPM     Atrial Rate : 078 BPM     P-R Int : 150 ms          QRS Dur : 084 ms      QT Int : 396 ms       P-R-T Axes : 052 -06 038 degrees     QTc Int : 451 ms    Normal sinus rhythm  Possible Anterior infarct ,age undetermined  Abnormal ECG clockwise rotation    When compared with ECG of 11-JAN-2022 11:23,  No significant change was found  Confirmed by Jonny MABRY, Mike WEAVER ([id number]) on 8/30/2023 12:34:14 PM    Referred By: [email or web address]   SELF           Confirmed By:Mike Fuller MD     No valid procedures specified.   Results for orders placed in visit on 08/29/23    Nuclear Stress Test    Interpretation Summary    The ECG portion of the study is negative for ischemia.    The patient reported no chest pain during the stress test.    During stress, rare PVCs are noted.    The nuclear portion of this study will be reported separately. The patient exercised for 8 minutes 30 seconds on a Beto protocol, corresponding to a functional capacity of 10.3 METS, achieving a peak heart rate of 147 bpm, which is 106 % of the age predicted maximum heart rate.    No valid procedures specified.    PHYSICAL EXAM  CONSTITUTIONAL: Well built, well nourished in no apparent distress  NECK: no carotid bruit, no JVD  LUNGS: CTA  CHEST WALL: no tenderness  HEART: regular rate and rhythm, S1, S2 normal, no murmur, click, rub or gallop   ABDOMEN: soft, non-tender; bowel sounds normal; no masses,  no organomegaly  EXTREMITIES:  Extremities normal, no edema, no calf tenderness noted  NEURO: AAO X 3    I HAVE REVIEWED :    The vital signs, nurses notes, and all the pertinent radiology and labs.    07/17/2025 EKG shows normal sinus rhythm rate of 76 beats per minute borderline voltage criteria for LVH.  Poor R-wave progression    Current Outpatient Medications   Medication Instructions    atorvastatin (LIPITOR) 15 mg, Oral, Nightly    calcium-vitamin D3 (OS-LUIS 500 + D3) 500 mg(1,250mg) -200 unit per tablet 1 tablet, 2 times daily with meals    coQ10, ubiquinol, 200 mg Cap 1 tablet, Daily    magnesium 400 mg, Oral, Once    meloxicam (MOBIC) 15 mg, Oral, Daily    metoprolol succinate (TOPROL-XL) 25 mg, Oral, Daily    multivit-min/iron/folic/lutein (CENTRUM SILVER WOMEN ORAL) 1 tablet, Daily    polyethylene glycol 3350 (MIRALAX ORAL) Take by mouth. Take 3/4 capful by mouth nightly as needed          Assessment & Plan     1. Essential hypertension  Assessment & Plan:  Her blood pressure is fairly well controlled at 118/62 mm Hg encouraged her to keep herself adequately hydrated plenty of fluids and continue on low dose of metoprolol at the same doses.  Also continue on supplements including magnesium      2. Dyslipidemia  Assessment & Plan:  She is presently on low doses of Lipitor maintain the same last lipid levels are stable encouraged her to continue on low-fat low-cholesterol diet repeat blood work before her next visit    Orders:  -     Lipid Panel; Future; Expected date: 07/17/2025  -     Comprehensive Metabolic Panel; Future; Expected date: 07/17/2025  -     Magnesium; Future; Expected date: 07/17/2025    3. Episodic lightheadedness  Assessment & Plan:  Patient had couple of episodes of lightheadedness but he has sporadic and infrequent.  Recommend periodic monitoring keep herself adequately hydrated  Recommend to obtain a event monitor if this has symptoms occur with increasing frequency prolonged in nature    Orders:  -     IN OFFICE  EKG 12-LEAD (to Muse)  -     Cardiac Monitor - 3-15 Day Adult; Future; Expected date: 07/17/2025  -     Lipid Panel; Future; Expected date: 07/17/2025  -     Comprehensive Metabolic Panel; Future; Expected date: 07/17/2025  -     Magnesium; Future; Expected date: 07/17/2025    Other orders  -     metoprolol succinate (TOPROL-XL) 25 MG 24 hr tablet; Take 1 tablet (25 mg total) by mouth once daily.  Dispense: 90 tablet; Refill: 3  -     magnesium 200 mg Tab; Take 400 mg by mouth once. for 1 dose  Dispense: 90 each; Refill: 3  -     atorvastatin (LIPITOR) 10 MG tablet; Take 1.5 tablets (15 mg total) by mouth every evening.  Dispense: 135 tablet; Refill: 3          No follow-ups on file.            [1]   Social History  Tobacco Use    Smoking status: Never    Smokeless tobacco: Never   Substance Use Topics    Alcohol use: Yes     Comment: rarely    Drug use: No

## 2025-07-17 NOTE — ASSESSMENT & PLAN NOTE
Her blood pressure is fairly well controlled at 118/62 mm Hg encouraged her to keep herself adequately hydrated plenty of fluids and continue on low dose of metoprolol at the same doses.  Also continue on supplements including magnesium

## 2025-07-24 ENCOUNTER — LAB VISIT (OUTPATIENT)
Dept: LAB | Facility: HOSPITAL | Age: 80
End: 2025-07-24
Attending: INTERNAL MEDICINE
Payer: MEDICARE

## 2025-07-24 DIAGNOSIS — E78.2 MIXED HYPERLIPIDEMIA: ICD-10-CM

## 2025-07-24 LAB
ANION GAP (SMH): 3 MMOL/L (ref 8–16)
BUN SERPL-MCNC: 15 MG/DL (ref 8–23)
CALCIUM SERPL-MCNC: 9.6 MG/DL (ref 8.7–10.5)
CHLORIDE SERPL-SCNC: 106 MMOL/L (ref 95–110)
CO2 SERPL-SCNC: 29 MMOL/L (ref 23–29)
CREAT SERPL-MCNC: 0.7 MG/DL (ref 0.5–1.4)
GFR SERPLBLD CREATININE-BSD FMLA CKD-EPI: >60 ML/MIN/1.73/M2
GLUCOSE SERPL-MCNC: 90 MG/DL (ref 70–110)
POTASSIUM SERPL-SCNC: 4 MMOL/L (ref 3.5–5.1)
SODIUM SERPL-SCNC: 138 MMOL/L (ref 136–145)

## 2025-07-24 PROCEDURE — 36415 COLL VENOUS BLD VENIPUNCTURE: CPT

## 2025-07-24 PROCEDURE — 80048 BASIC METABOLIC PNL TOTAL CA: CPT

## 2025-07-27 NOTE — PROGRESS NOTES
Subjective:       Patient ID: Farrah Boston is a 80 y.o. female.    Chief Complaint: Hyperlipidemia, Thyroid Problem, and Labs Only    Pt Comes for 6 months follow up    Previusly we had noted abnormal TSH while she was on Biotin supplements.    History of Present Illness    CHIEF COMPLAINT:  Farrah presents for a follow-up visit to discuss recent lab results and ongoing health concerns.    HPI:  Farrah reports fatigue, which she attributes to caring for her 102-year-old mother who is currently hospitalized with congestive heart failure. She has been spending significant time at the hospital since Friday, often sleeping in her mother's room, which has disrupted her sleep patterns. She reports difficulty sleeping after 4 AM due to constant interruptions and lights being on in the hospital room.    She has had recent weight gain, though she is unsure of the cause and does not believe she is eating more than usual. She notes increased difficulty in losing weight and lightheadedness when attempting to fast or restrict her diet.    Her reflux and heartburn symptoms have resolved without medication after following the doctor's advice to watch her diet and use Tums as needed.    Her thyroid function was abnormal in the past, but a January test showed normal results after discontinuing a biotin supplement that had affected her levels.    She reports having very sensitive skin on one of her legs, which develops marks and scars from bug bites. She describes these marks as resembling a constellation pattern and notes that they only appear on one leg although both legs are equally exposed to bites.    She denies any specific symptoms related to her thyroid condition.    MEDICATIONS:  Farrah is on Atorvastatin 15 mg, taking 1.5 tablets daily. She is also on Metoprolol 25 mg daily for blood pressure management. Her regimen includes daily Calcium and Vitamin D3 supplements. She takes MiraLAX daily for constipation. Farrah has  discontinued her reflux medication as she no longer needs it after making lifestyle changes.    MEDICAL HISTORY:  Farrah has a history of thyroid abnormality and reflux, both of which have resolved. She currently experiences constipation, which is being treated with MiraLAX. Farrah is up to date on her pneumonia and tetanus vaccinations. She receives yearly flu vaccines.    FAMILY HISTORY:  Family history is significant for mother, who is 102 years old and currently hospitalized with congestive heart failure.    TEST RESULTS:  Farrah's basic chemistry tests from last week showed sodium at 138, BUN at 15, creatinine at 0.7, and potassium at 4.0. Her blood sugar and kidney tests were normal. A thyroid test conducted in January was normal, which was previously abnormal when she was taking a Biotin supplement.    SOCIAL HISTORY:  Occupation: Writer, currently writing private commissions including a biography      ROS:  General: -fever, -chills, +fatigue, +weight gain, -weight loss, +lightheaded if meals are missed  Cardiovascular: -chest pain, -palpitations, -lower extremity edema  Respiratory: -cough, -shortness of breath  Gastrointestinal: -abdominal pain, -nausea, -vomiting, -diarrhea, -constipation, -blood in stool  Skin: -rash, -lesion, +insect bites  Neurological: -headache, -dizziness, -numbness, -tingling         Past Medical History:   Diagnosis Date    Allergy     Anxiety     Depression     GERD (gastroesophageal reflux disease)     H/O colonoscopy 10/6/2022    Need for hepatitis C screening test 2/28/2018    Negative    Osteopenia     Reflux esophagitis     Seasonal allergies      Social History[1]  Past Surgical History:   Procedure Laterality Date    ABDOMINAL ADHESION SURGERY      ANTERIOR VAGINAL REPAIR      BREAST BIOPSY      benign    CHOLECYSTECTOMY      HERNIA REPAIR      HYSTERECTOMY       Family History   Problem Relation Name Age of Onset    Hypertension Mother      Cirrhosis Father      Breast cancer  "Maternal Aunt  70    Breast cancer Maternal Aunt  80    Breast cancer Paternal Grandmother  35    Ovarian cancer Neg Hx         Objective:      Physical Exam  Blood pressure (P) 135/73, pulse 95, height 5' 6" (1.676 m), weight 66 kg (145 lb 8.1 oz). Body mass index is 23.48 kg/m².  Physical Exam    General: No acute distress. Well-developed.  Slim and thin built  Eyes: EOMI. Sclerae anicteric.  Cardiovascular: Regular rate. Regular rhythm. No murmurs. No rubs. No gallops. Normal S1, S2.  Respiratory: Normal respiratory effort. Clear to auscultation bilaterally. No rales. No rhonchi. No wheezing.  Abdomen soft no rigidity or rebound  Musculoskeletal: No  obvious deformity.  Extremities: No lower extremity edema.  Neurological: Alert & oriented to context and situation.  Psychiatric:  Mildly anhedonic and anxious.  Skin: Warm. Dry.              Assessment:       Lab Visit on 07/24/2025   Component Date Value Ref Range Status    Sodium 07/24/2025 138  136 - 145 mmol/L Final    Potassium 07/24/2025 4.0  3.5 - 5.1 mmol/L Final    Chloride 07/24/2025 106  95 - 110 mmol/L Final    CO2 07/24/2025 29  23 - 29 mmol/L Final    Glucose 07/24/2025 90  70 - 110 mg/dL Final    BUN 07/24/2025 15  8 - 23 mg/dL Final    Creatinine 07/24/2025 0.7  0.5 - 1.4 mg/dL Final    Calcium 07/24/2025 9.6  8.7 - 10.5 mg/dL Final    Anion Gap 07/24/2025 3 (L)  8 - 16 mmol/L Final    eGFR 07/24/2025 >60  >60 mL/min/1.73/m2 Final   Office Visit on 07/17/2025   Component Date Value Ref Range Status    QRS Duration 07/17/2025 72  ms Final    [facility] QTC Calculation 07/17/2025 441  ms Final       Assessment & Plan    R79.89 ABNORMAL THYROID BLOOD TEST:  - Reviewed thyroid function history: previously abnormal, normalized in January after discontinuing biotin supplement.  - Ordered thyroid function test to evaluate reported weight gain and difficulty losing weight.    R53.83 FATIGUE, UNSPECIFIED TYPE:  - Evaluated fatigue, likely related to caregiver " stress for 102-year-old mother, rather than thyroid dysfunction.    K21.9 GASTROESOPHAGEAL REFLUX DISEASE WITHOUT ESOPHAGITIS:  - Reflux symptoms improved with lifestyle modifications.    E78.2 MIXED HYPERLIPIDEMIA:  - Continued atorvastatin 15 mg, 1.5 tablets daily.  - Ordered lipid panel.         Plan:   Abnormal thyroid blood test  Comments:  Thyroid tests were abnormal in past but she is on biotin.  Last test in January was okay and I would like to check 1 more lab.  Orders:  -     TSH; Future; Expected date: 07/29/2025    Mixed hyperlipidemia  Comments:  Following up with Dr. Bowen and currently on atorvastatin 15 mg per day.    Fatigue, unspecified type  Comments:  Mild nonspecific fatigue.  She is tired because she has to take care of her mother who is 102 years old and in the hospital.  Orders:  -     TSH; Future; Expected date: 07/29/2025    Gastroesophageal reflux disease without esophagitis  Comments:  This seems to be better and not on any medications at this point.  She watch her diet and she toughed it out.  She took Tums as needed.      Overall miss Yan is doing good.  Her last thyroid test was normal.  Will check another thyroid test to be sure that it remains so.  She thankfully did not start on any thyroxine preparation and she kept herself off biotin and her T4 and TSH has normalized.  She is not taking any biotin supplements.  She does have some family history of thyroid conditions and I will keep my fingers crossed for that.    She takes her atorvastatin 15 mg for cholesterol /coenzyme Q10 also  She takes her metoprolol 25 mg for blood pressure   She also takes her calcium and vitamin D3 supplements.  She takes meloxicam for arthritis also.  She takes MiraLax as needed for constipation and this seems to help her  She is going through a little stressful phase in her life because of her mother who is 102 years old and is in the hospital with congestive heart failure.  She is the sole caretaker  for her mother at this point.  There is a consideration for hospice for her mother and it is a tough decision for her.  She is updated on the pneumonia vaccine and flu shots every year.  She is also updated on tetanus vaccination.  I will advise her to consider taking a RSV vaccine also.  Follow up in about 6 months (around 1/29/2026), or if symptoms worsen or fail to improve, for Thyroid and lipids.    Current Medications[2]    This note was generated with the assistance of ambient listening technology. Verbal consent was obtained by the patient and accompanying visitor(s) for the recording of patient appointment to facilitate this note. I attest to having reviewed and edited the generated note for accuracy, though some syntax or spelling errors may persist. Please contact the author of this note for any clarification.      Jun Lomeli           [1]   Social History  Socioeconomic History    Marital status:      Spouse name: Mike Boston    Number of children: 3   Occupational History    Occupation: Journal Contributor/Writer     Comment: [employer]   Tobacco Use    Smoking status: Never    Smokeless tobacco: Never   Substance and Sexual Activity    Alcohol use: Yes     Comment: rarely    Drug use: No    Sexual activity: Yes     Partners: Male     Birth control/protection: None     Social Drivers of Health     Financial Resource Strain: Low Risk  (11/3/2024)    Overall Financial Resource Strain (CARDIA)     Difficulty of Paying Living Expenses: Not hard at all   Food Insecurity: No Food Insecurity (11/3/2024)    Hunger Vital Sign     Worried About Running Out of Food in the Last Year: Never true     Ran Out of Food in the Last Year: Never true   Transportation Needs: No Transportation Needs (5/2/2023)    PRAPARE - Transportation     Lack of Transportation (Medical): No     Lack of Transportation (Non-Medical): No   Physical Activity: Inactive (11/3/2024)    Exercise Vital Sign     Days of Exercise  per Week: 0 days     Minutes of Exercise per Session: 0 min   Stress: No Stress Concern Present (11/3/2024)    [employer] - Occupational Stress Questionnaire     Feeling of Stress : Not at all   Housing Stability: Low Risk  (5/2/2023)    Housing Stability Vital Sign     Unable to Pay for Housing in the Last Year: No     Number of Places Lived in the Last Year: 1     Unstable Housing in the Last Year: No   [2]   Current Outpatient Medications:     atorvastatin (LIPITOR) 10 MG tablet, Take 1.5 tablets (15 mg total) by mouth every evening., Disp: 135 tablet, Rfl: 3    calcium-vitamin D3 (OS-LUIS 500 + D3) 500 mg(1,250mg) -200 unit per tablet, Take 1 tablet by mouth 2 (two) times daily with meals., Disp: , Rfl:     coQ10, ubiquinol, 200 mg Cap, Take 1 tablet by mouth Daily., Disp: , Rfl:     metoprolol succinate (TOPROL-XL) 25 MG 24 hr tablet, Take 1 tablet (25 mg total) by mouth once daily., Disp: 90 tablet, Rfl: 3    multivit-min/iron/folic/lutein (CENTRUM SILVER WOMEN ORAL), Take 1 tablet by mouth Daily., Disp: , Rfl:     polyethylene glycol 3350 (MIRALAX ORAL), Take by mouth. Take 3/4 capful by mouth nightly as needed, Disp: , Rfl:

## 2025-07-29 ENCOUNTER — LAB VISIT (OUTPATIENT)
Dept: LAB | Facility: HOSPITAL | Age: 80
End: 2025-07-29
Attending: INTERNAL MEDICINE
Payer: MEDICARE

## 2025-07-29 ENCOUNTER — OFFICE VISIT (OUTPATIENT)
Dept: FAMILY MEDICINE | Facility: CLINIC | Age: 80
End: 2025-07-29
Payer: MEDICARE

## 2025-07-29 VITALS
DIASTOLIC BLOOD PRESSURE: 74 MMHG | WEIGHT: 145.5 LBS | BODY MASS INDEX: 23.38 KG/M2 | SYSTOLIC BLOOD PRESSURE: 145 MMHG | HEART RATE: 95 BPM | HEIGHT: 66 IN

## 2025-07-29 DIAGNOSIS — K21.9 GASTROESOPHAGEAL REFLUX DISEASE WITHOUT ESOPHAGITIS: ICD-10-CM

## 2025-07-29 DIAGNOSIS — R79.89 ABNORMAL THYROID BLOOD TEST: ICD-10-CM

## 2025-07-29 DIAGNOSIS — E78.2 MIXED HYPERLIPIDEMIA: Chronic | ICD-10-CM

## 2025-07-29 DIAGNOSIS — R53.83 FATIGUE, UNSPECIFIED TYPE: ICD-10-CM

## 2025-07-29 DIAGNOSIS — R79.89 ABNORMAL THYROID BLOOD TEST: Primary | ICD-10-CM

## 2025-07-29 LAB — TSH SERPL-ACNC: 3.34 UIU/ML (ref 0.34–5.6)

## 2025-07-29 PROCEDURE — 36415 COLL VENOUS BLD VENIPUNCTURE: CPT

## 2025-07-29 PROCEDURE — 99999 PR PBB SHADOW E&M-EST. PATIENT-LVL III: CPT | Mod: PBBFAC,HCNC,, | Performed by: INTERNAL MEDICINE

## 2025-07-29 PROCEDURE — 84443 ASSAY THYROID STIM HORMONE: CPT

## 2025-07-29 NOTE — PATIENT INSTRUCTIONS
Edvin Yan,     If you are due for any health screening(s) below please notify me so we can arrange them to be ordered and scheduled. Most healthy patients at your age complete them, but you are free to accept or refuse.     If you can't do it, I'll definitely understand. If you can, I'd certainly appreciate it!    All of your core healthy metrics are met.      Lets manage your high blood pressure     Your blood pressure was above 140/90 today during your visit. We recommend that you schedule a nurse visit in two weeks to check your blood pressure and discuss ways to support your health goals.     You can also manage your health and record your blood pressure from the comfort of home by keeping a daily blood pressure log. These results are shared with and reviewed by your provider. Please print this form (Daily Blood Pressure Log) to assist you in keeping track of your blood pressure at home.     Schedule your nurse visit in two weeks to learn more about how to track and manage high blood pressure.    Daily Blood Pressure Log    Name:__________________________________                  Date of Birth:_________    Average Blood Pressure:  __________      Date: Time  (a.m.) Blood  Pressure: Pulse  Rate: Time  (p.m.) Blood  Pressure : Pulse  Rate:   Sample 8:37 127/83 84

## 2025-09-04 ENCOUNTER — HOSPITAL ENCOUNTER (OUTPATIENT)
Dept: CARDIOLOGY | Facility: CLINIC | Age: 80
Discharge: HOME | End: 2025-09-04
Attending: INTERNAL MEDICINE
Payer: MEDICARE

## 2025-09-04 DIAGNOSIS — R42 EPISODIC LIGHTHEADEDNESS: ICD-10-CM
